# Patient Record
Sex: FEMALE | Race: WHITE | Employment: FULL TIME | URBAN - METROPOLITAN AREA
[De-identification: names, ages, dates, MRNs, and addresses within clinical notes are randomized per-mention and may not be internally consistent; named-entity substitution may affect disease eponyms.]

---

## 2017-04-11 PROBLEM — R07.9 CHEST PAIN: Status: ACTIVE | Noted: 2017-04-11

## 2017-04-17 PROBLEM — M48.9 CERVICAL SPINE DISEASE: Status: ACTIVE | Noted: 2017-04-17

## 2019-08-14 PROBLEM — E66.01 SEVERE OBESITY (HCC): Status: ACTIVE | Noted: 2019-08-14

## 2019-09-21 ENCOUNTER — HOSPITAL ENCOUNTER (OUTPATIENT)
Dept: MRI IMAGING | Age: 54
Discharge: HOME | End: 2019-09-21
Attending: FAMILY MEDICINE

## 2019-09-21 DIAGNOSIS — M54.6 ACUTE RIGHT-SIDED THORACIC BACK PAIN: ICD-10-CM

## 2020-07-06 LAB — HBA1C MFR BLD HPLC: 6.9 %

## 2020-08-20 PROBLEM — M43.16 SPONDYLOLISTHESIS AT L4-L5 LEVEL: Status: ACTIVE | Noted: 2020-08-20

## 2020-08-20 PROBLEM — E66.9 CLASS 2 OBESITY WITHOUT SERIOUS COMORBIDITY WITH BODY MASS INDEX (BMI) OF 37.0 TO 37.9 IN ADULT: Status: ACTIVE | Noted: 2019-08-14

## 2020-08-20 PROBLEM — E66.01 SEVERE OBESITY (HCC): Status: ACTIVE | Noted: 2020-08-20

## 2020-08-20 PROBLEM — Z72.0 TOBACCO ABUSE: Status: ACTIVE | Noted: 2020-08-20

## 2020-09-14 NOTE — PROGRESS NOTES
Do you currently have any signs or symptoms of respiratory infection, such as fever, cough, shortness of breath, or sore throat? no    In the last 14 days have you had contact with someone with confirmed or presumptive diagnosis of COVID-19 or someone under investigation of COVID-19?  no    In the last 14 days have you traveled or have someone in your home who has traveled to [place], [facility], [facility], [place], [place], [place], [place], or [place]? no

## 2020-09-14 NOTE — PROGRESS NOTES
Patient pre-assessment complete for felipe/lei scheduled for 9/15/2020, arrival time 0630. Patient verified using . Patient instructed to bring all home medications in labeled bottles on the day of procedure. NPO status reinforced. Patient informed to take a full dose aspirin 325mg  or 81 mg x 4 on the day of procedure. Instructed they can take all other medications excluding vitamins & supplements. Patient verbalizes understanding of all instructions & denies any questions at this time.

## 2020-09-15 ENCOUNTER — HOSPITAL ENCOUNTER (OUTPATIENT)
Dept: CARDIAC CATH/INVASIVE PROCEDURES | Age: 55
Discharge: HOME | End: 2020-09-15
Attending: INTERNAL MEDICINE | Admitting: INTERNAL MEDICINE
Payer: COMMERCIAL

## 2020-09-15 VITALS
BODY MASS INDEX: 37.61 KG/M2 | HEIGHT: 66 IN | RESPIRATION RATE: 16 BRPM | HEART RATE: 70 BPM | OXYGEN SATURATION: 96 % | SYSTOLIC BLOOD PRESSURE: 116 MMHG | DIASTOLIC BLOOD PRESSURE: 55 MMHG | WEIGHT: 234 LBS | TEMPERATURE: 98.6 F

## 2020-09-15 LAB
ALBUMIN SERPL-MCNC: 3.3 G/DL (ref 3.5–5)
ALBUMIN/GLOB SERPL: 0.8 {RATIO} (ref 1.2–3.5)
ALP SERPL-CCNC: 104 U/L (ref 50–136)
ALT SERPL-CCNC: 40 U/L (ref 12–65)
ANION GAP SERPL CALC-SCNC: 7 MMOL/L (ref 7–16)
AST SERPL-CCNC: 21 U/L (ref 15–37)
ATRIAL RATE: 72 BPM
BILIRUB SERPL-MCNC: 0.2 MG/DL (ref 0.2–1.1)
BUN SERPL-MCNC: 10 MG/DL (ref 6–23)
CALCIUM SERPL-MCNC: 8.7 MG/DL (ref 8.3–10.4)
CALCULATED P AXIS, ECG09: 30 DEGREES
CALCULATED R AXIS, ECG10: -49 DEGREES
CALCULATED T AXIS, ECG11: 47 DEGREES
CHLORIDE SERPL-SCNC: 108 MMOL/L (ref 98–107)
CO2 SERPL-SCNC: 24 MMOL/L (ref 21–32)
CREAT SERPL-MCNC: 0.8 MG/DL (ref 0.6–1)
DIAGNOSIS, 93000: NORMAL
ERYTHROCYTE [DISTWIDTH] IN BLOOD BY AUTOMATED COUNT: 14.5 % (ref 11.9–14.6)
GLOBULIN SER CALC-MCNC: 4.1 G/DL (ref 2.3–3.5)
GLUCOSE SERPL-MCNC: 159 MG/DL (ref 65–100)
HCT VFR BLD AUTO: 41.2 % (ref 35.8–46.3)
HGB BLD-MCNC: 13.2 G/DL (ref 11.7–15.4)
INR PPP: 0.9
MCH RBC QN AUTO: 27 PG (ref 26.1–32.9)
MCHC RBC AUTO-ENTMCNC: 32 G/DL (ref 31.4–35)
MCV RBC AUTO: 84.4 FL (ref 79.6–97.8)
NRBC # BLD: 0 K/UL (ref 0–0.2)
P-R INTERVAL, ECG05: 176 MS
PLATELET # BLD AUTO: 278 K/UL (ref 150–450)
PMV BLD AUTO: 9.6 FL (ref 9.4–12.3)
POTASSIUM SERPL-SCNC: 3.2 MMOL/L (ref 3.5–5.1)
PROT SERPL-MCNC: 7.4 G/DL (ref 6.3–8.2)
PROTHROMBIN TIME: 12.6 SEC (ref 12–14.7)
Q-T INTERVAL, ECG07: 402 MS
QRS DURATION, ECG06: 94 MS
QTC CALCULATION (BEZET), ECG08: 440 MS
RBC # BLD AUTO: 4.88 M/UL (ref 4.05–5.2)
SODIUM SERPL-SCNC: 139 MMOL/L (ref 136–145)
VENTRICULAR RATE, ECG03: 72 BPM
WBC # BLD AUTO: 8.7 K/UL (ref 4.3–11.1)

## 2020-09-15 PROCEDURE — 75630 X-RAY AORTA LEG ARTERIES: CPT

## 2020-09-15 PROCEDURE — 85610 PROTHROMBIN TIME: CPT

## 2020-09-15 PROCEDURE — 75710 ARTERY X-RAYS ARM/LEG: CPT

## 2020-09-15 PROCEDURE — 93005 ELECTROCARDIOGRAM TRACING: CPT | Performed by: INTERNAL MEDICINE

## 2020-09-15 PROCEDURE — 77030018786 HC NDL GD F/USND BARD -B

## 2020-09-15 PROCEDURE — C1725 CATH, TRANSLUMIN NON-LASER: HCPCS

## 2020-09-15 PROCEDURE — C1769 GUIDE WIRE: HCPCS

## 2020-09-15 PROCEDURE — 85027 COMPLETE CBC AUTOMATED: CPT

## 2020-09-15 PROCEDURE — 99153 MOD SED SAME PHYS/QHP EA: CPT

## 2020-09-15 PROCEDURE — 37221 HC PLC STNT ILIAC +/- PTA INIT: CPT

## 2020-09-15 PROCEDURE — 75625 CONTRAST EXAM ABDOMINL AORTA: CPT

## 2020-09-15 PROCEDURE — 80053 COMPREHEN METABOLIC PANEL: CPT

## 2020-09-15 PROCEDURE — 75716 ARTERY X-RAYS ARMS/LEGS: CPT

## 2020-09-15 PROCEDURE — C1894 INTRO/SHEATH, NON-LASER: HCPCS

## 2020-09-15 PROCEDURE — 74011000258 HC RX REV CODE- 258: Performed by: INTERNAL MEDICINE

## 2020-09-15 PROCEDURE — 74011000636 HC RX REV CODE- 636: Performed by: INTERNAL MEDICINE

## 2020-09-15 PROCEDURE — 74011000250 HC RX REV CODE- 250: Performed by: INTERNAL MEDICINE

## 2020-09-15 PROCEDURE — 77030015395 HC CATH ANGI DX SVU ANGI -C

## 2020-09-15 PROCEDURE — 74011250637 HC RX REV CODE- 250/637: Performed by: INTERNAL MEDICINE

## 2020-09-15 PROCEDURE — C1760 CLOSURE DEV, VASC: HCPCS

## 2020-09-15 PROCEDURE — C1876 STENT, NON-COA/NON-COV W/DEL: HCPCS

## 2020-09-15 PROCEDURE — 99152 MOD SED SAME PHYS/QHP 5/>YRS: CPT

## 2020-09-15 PROCEDURE — 74011250636 HC RX REV CODE- 250/636: Performed by: INTERNAL MEDICINE

## 2020-09-15 RX ORDER — IODIXANOL 320 MG/ML
30-300 INJECTION, SOLUTION INTRAVASCULAR ONCE
Status: COMPLETED | OUTPATIENT
Start: 2020-09-15 | End: 2020-09-15

## 2020-09-15 RX ORDER — HEPARIN SODIUM 200 [USP'U]/100ML
2 INJECTION, SOLUTION INTRAVENOUS CONTINUOUS
Status: DISCONTINUED | OUTPATIENT
Start: 2020-09-15 | End: 2020-09-15 | Stop reason: HOSPADM

## 2020-09-15 RX ORDER — SODIUM CHLORIDE 9 MG/ML
250 INJECTION, SOLUTION INTRAVENOUS CONTINUOUS
Status: ACTIVE | OUTPATIENT
Start: 2020-09-15 | End: 2020-09-15

## 2020-09-15 RX ORDER — CLOPIDOGREL BISULFATE 75 MG/1
600 TABLET ORAL ONCE
Status: COMPLETED | OUTPATIENT
Start: 2020-09-15 | End: 2020-09-15

## 2020-09-15 RX ORDER — CLOPIDOGREL BISULFATE 75 MG/1
75 TABLET ORAL DAILY
Qty: 90 TAB | Refills: 3 | Status: SHIPPED | OUTPATIENT
Start: 2020-09-15 | End: 2021-01-18

## 2020-09-15 RX ORDER — SODIUM CHLORIDE 0.9 % (FLUSH) 0.9 %
5-40 SYRINGE (ML) INJECTION EVERY 8 HOURS
Status: DISCONTINUED | OUTPATIENT
Start: 2020-09-15 | End: 2020-09-15 | Stop reason: HOSPADM

## 2020-09-15 RX ORDER — LIDOCAINE HYDROCHLORIDE 10 MG/ML
3-20 INJECTION, SOLUTION EPIDURAL; INFILTRATION; INTRACAUDAL; PERINEURAL ONCE
Status: COMPLETED | OUTPATIENT
Start: 2020-09-15 | End: 2020-09-15

## 2020-09-15 RX ORDER — SODIUM CHLORIDE 9 MG/ML
75 INJECTION, SOLUTION INTRAVENOUS CONTINUOUS
Status: DISCONTINUED | OUTPATIENT
Start: 2020-09-15 | End: 2020-09-15 | Stop reason: HOSPADM

## 2020-09-15 RX ORDER — MIDAZOLAM HYDROCHLORIDE 1 MG/ML
.5-2 INJECTION, SOLUTION INTRAMUSCULAR; INTRAVENOUS
Status: DISCONTINUED | OUTPATIENT
Start: 2020-09-15 | End: 2020-09-15 | Stop reason: HOSPADM

## 2020-09-15 RX ORDER — SODIUM CHLORIDE 0.9 % (FLUSH) 0.9 %
5-40 SYRINGE (ML) INJECTION AS NEEDED
Status: DISCONTINUED | OUTPATIENT
Start: 2020-09-15 | End: 2020-09-15 | Stop reason: HOSPADM

## 2020-09-15 RX ORDER — GUAIFENESIN 100 MG/5ML
81-324 LIQUID (ML) ORAL ONCE
Status: DISCONTINUED | OUTPATIENT
Start: 2020-09-15 | End: 2020-09-15 | Stop reason: HOSPADM

## 2020-09-15 RX ORDER — MAG HYDROX/ALUMINUM HYD/SIMETH 200-200-20
30 SUSPENSION, ORAL (FINAL DOSE FORM) ORAL ONCE
Status: COMPLETED | OUTPATIENT
Start: 2020-09-15 | End: 2020-09-15

## 2020-09-15 RX ORDER — FENTANYL CITRATE 50 UG/ML
25-100 INJECTION, SOLUTION INTRAMUSCULAR; INTRAVENOUS
Status: DISCONTINUED | OUTPATIENT
Start: 2020-09-15 | End: 2020-09-15 | Stop reason: HOSPADM

## 2020-09-15 RX ORDER — ONDANSETRON 2 MG/ML
4 INJECTION INTRAMUSCULAR; INTRAVENOUS ONCE
Status: COMPLETED | OUTPATIENT
Start: 2020-09-15 | End: 2020-09-15

## 2020-09-15 RX ADMIN — ONDANSETRON 4 MG: 2 INJECTION INTRAMUSCULAR; INTRAVENOUS at 10:04

## 2020-09-15 RX ADMIN — BIVALIRUDIN 1.75 MG/KG/HR: 250 INJECTION INTRACAVERNOUS at 09:11

## 2020-09-15 RX ADMIN — HEPARIN SODIUM 2 UNITS/HR: 5000 INJECTION, SOLUTION INTRAVENOUS; SUBCUTANEOUS at 08:40

## 2020-09-15 RX ADMIN — MIDAZOLAM 2 MG: 1 INJECTION INTRAMUSCULAR; INTRAVENOUS at 09:17

## 2020-09-15 RX ADMIN — FENTANYL CITRATE 50 MCG: 50 INJECTION, SOLUTION INTRAMUSCULAR; INTRAVENOUS at 08:48

## 2020-09-15 RX ADMIN — ALUMINUM HYDROXIDE, MAGNESIUM HYDROXIDE, AND SIMETHICONE 30 ML: 200; 200; 20 SUSPENSION ORAL at 09:36

## 2020-09-15 RX ADMIN — FENTANYL CITRATE 50 MCG: 50 INJECTION, SOLUTION INTRAMUSCULAR; INTRAVENOUS at 09:00

## 2020-09-15 RX ADMIN — IODIXANOL 150 ML: 320 INJECTION, SOLUTION INTRAVASCULAR at 09:30

## 2020-09-15 RX ADMIN — MIDAZOLAM 1 MG: 1 INJECTION INTRAMUSCULAR; INTRAVENOUS at 09:00

## 2020-09-15 RX ADMIN — LIDOCAINE HYDROCHLORIDE 12 ML: 10 INJECTION, SOLUTION EPIDURAL; INFILTRATION; INTRACAUDAL; PERINEURAL at 08:54

## 2020-09-15 RX ADMIN — CLOPIDOGREL BISULFATE 600 MG: 75 TABLET ORAL at 09:36

## 2020-09-15 RX ADMIN — MIDAZOLAM 2 MG: 1 INJECTION INTRAMUSCULAR; INTRAVENOUS at 08:51

## 2020-09-15 RX ADMIN — MIDAZOLAM 2 MG: 1 INJECTION INTRAMUSCULAR; INTRAVENOUS at 08:48

## 2020-09-15 NOTE — PROGRESS NOTES
Assisted OOB & ambulated to BR & on unit. Tolerated activity without difficulty.  Left groin without bleeding or hematoma

## 2020-09-15 NOTE — PROCEDURES
Brief Cardiac Procedure Note    Patient: Katya Crane MRN: [id number]  SSN: [id number]    YOB: 1965  Age: 60 y.o. Sex: female      Date of Procedure: 9/15/2020     Pre-procedure Diagnosis: PAD with Claudication    Post-procedure Diagnosis: PAD    Procedure:   1. Abdominal Aogram  2.  3rd order (R)LE r/o  3.  2nd order (L)LE r/o  4. PPI stent (L) External iliac    Brief Description of Procedure: See note    Performed By: Devin Magana MD     Assistants: None    Anesthesia: Moderate Sedation    Estimated Blood Loss: Less than 10 mL      Specimens: None    Implants: None    Findings:   (L) Ext Iliac 80-0%   8.0x30 SMART stent   7.0x20 [place] balloon    600mg Clopidogrel    (L)CFA angioseal    Complications: None    Recommendations: Continue medical therapy.     Signed By: Devin Magana MD     September 15, 2020

## 2020-09-15 NOTE — PROCEDURES
[facility]  CARDIAC CATH    Name:  Aisha Kelly  MR#:  [id number]  :  1965  ACCOUNT #:  [de-identified]  DATE OF SERVICE:  09/15/2020    PRIMARY CARDIOLOGIST:  oJn Spicer MD    PRIMARY CARE PHYSICIAN:  Abram Resendiz. Vibha Moran MD    BRIEF HISTORY:  The patient is a 42-year-old female with history of peripheral arterial disease, prior PCI and stenting of the left iliac in . She presents now with complicated lower back issues as well as left lower extremity claudication. Unfortunately, she remains noncompliant with tobacco cessation. Review of lower extremity duplex suggests mild to moderately reduced left lower extremity ABIs. There appears to be no significant obstruction in the infrainguinal aspect of her arteries. There is turbulent flow seen in the left common iliac and external iliac, but imaging could not assessment degree of stenosis. Given abnormal BHAKTI and symptoms, she is referred for angiography intervention. PREOPERATIVE DIAGNOSIS:  Peripheral arterial disease with intermittent claudication. POSTOPERATIVE DIAGNOSIS:  Peripheral arterial disease with intermittent claudication. SURGEON:  Manoj Tripp MD    ASSISTANT:  None. PROCEDURES PERFORMED:  1. Abdominal aortography. 2.  Oblique pelvic angiography bilaterally. 3.  Third order right lower extremity runoff. 4.  Second order left lower extremity runoff. 5.  PPI with stenting of the left external iliac artery. COMPLICATIONS:  None. ANESTHESIA:  Conscious sedation. Start time 08:48, end time 09:30. MEDICATIONS:  7 mg of Versed, 100 mcg of fentanyl. MONITORING RN:  Moris Sherman. SPECIMENS:  None. IMPLANTS:  Please see below. ESTIMATED BLOOD LOSS:  Less than 5 mL. PROCEDURE:  After informed consent, she was prepped and draped in the usual sterile fashion. The left groin was infiltrated with lidocaine. Under ultrasound guidance, the left femoral artery was accessed.   A 5-Sol[place] Islander sheath was advanced. A 5-[place] Islander Omni flush catheter was utilized for abdominal aortography and crossover technique third order right lower extremity runoff. A 6-Sol[place] Islander sheath was utilized for left lower extremity runoff. Visipaque contrast utilized. FINDINGS:  1. Abdominal aorta:  Abdominal aorta is fairly regular in appearance. There is moderate calcification seen throughout. There is single unilateral renal arteries bilaterally which remain patent. 2.  Oblique pelvic angiography demonstrates normal-appearing with mild ectasia of the right common iliac artery, but widely patent internal and external iliac arteries bilaterally. 3.  Left common iliac artery has been stented. There is mild stenosis at the ostium on the order of 20%. There is an irregularity at the distal stent edge which appears to be associated with 80% stenosis. The internal iliac artery is patent. The disease extends into the very proximal external iliac artery on the order of 80%. The external iliac artery otherwise is widely patent. 4.  Right lower extremity runoff: This was obtained via a 5-[place] Islander Omni flush catheter. a. Common femoral artery:  Normal.  b.  Profunda femoral artery:  Normal.  c.  Superficial femoral artery:  Normal.  d.  Popliteal artery:  Normal.  e. Anterior tibial artery:  Normal.  f.  Tibioperoneal trunk:  Normal.  g. Peroneal artery:  Normal.  h.  Posterior tibial artery:  Normal.  5.  Left lower extremity runoff:  a.  Common femoral:  Normal.  b.  Profunda femoral:  Normal.  c.  Superficial femoral artery:  Normal.  d.  Popliteal artery:  Normal.  e. Anterior tibial artery:  Normal.  f.  Tibioperoneal trunk:  Normal.  g. Peroneal artery:  Normal.  h.  Posterior tibial artery:  Normal.    PERIPHERAL INTERVENTION:  Lesion:  Left external iliac artery. Pre-stenosis 80%, post-stenosis 0%. DETAILS:  The patient was anticoagulated with Angiomax. A 6-[place] Islander Brite tip sheath was utilized. This was placed distal to the stent and an 8 x 30 Smart nitinol self-expanding stent was overlapped with the previously placed common iliac artery stent. This was deployed in standard fashion. A 7.0 x 20 [place] 35 balloon was then positioned and inflated up to 10 atmospheres for 60 seconds with excellent stent apposition. The balloon was removed. The 5-Thai Omni flush catheter was then advanced back into the distal aorta. Repeat oblique pelvic angiography demonstrates widely patent stenting and excellent result post external iliac artery stenting. There was no compromise of the internal iliac artery on the left. Successful hemostasis with Angio-Seal closure device. CONCLUSIONS:  1. Complex distal stent edge stenosis extending into the external iliac artery, status post stenting as described above. 2.  Otherwise widely patent left common iliac artery stenting. 3.  Normal-appearing vasculature otherwise. RECOMMENDATIONS:  Monitor the patient for left lower extremity claudication improvement post stenting. If this does not improve, would recommend ongoing evaluation of chronic back pain. Thank you for allowing us to participate in the care of this patient. Any questions or concerns, please feel free to contact me.       Kemi Diallo MD      [id number]  D:  09/15/2020 9:47  T:  09/15/2020 10:54  JOB #:  [id number]  CC:  MD Quang Mcdowell MD

## 2020-09-15 NOTE — PROGRESS NOTES
TRANSFER - OUT REPORT:    ISAMAR Mclean  LFA closed with 6 fr angioseal  1 stent L iliac  Versed 7 mg  Fentanyl 100 mcg  Angiomax off at 0936  Plavix 600 mg  Mylanta 30 ml  No bleeding/hematoma    Verbal report given to magi(name) on Liliam Roland  being transferred to cpru(unit) for routine progression of care       Report consisted of patients Situation, Background, Assessment and   Recommendations(SBAR). Information from the following report(s) SBAR and Procedure Summary was reviewed with the receiving nurse. Lines:   Peripheral IV 09/15/20 Left Hand (Active)        Opportunity for questions and clarification was provided.

## 2020-09-15 NOTE — DISCHARGE INSTRUCTIONS
HEART CATHETERIZATION/ANGIOGRAPHY DISCHARGE INSTRUCTIONS    1. Check puncture site frequently for swelling or bleeding. If there is any bleeding, lie down and apply pressure over the area with a clean towel or washcloth. Call 911. Notify your doctor for any redness, swelling, drainage, or oozing from the puncture site. Notify your doctor for any fever or chills. 2. If the extremity becomes cold, numb, or painful call [facility] at [phone].  3. Activity should be limited for the next 48 hours. Climb stairs as little as possible and avoid any stooping, bending, or strenuous activity for 48 hours. No heavy lifting (anything over 10 pounds) for 3 days. 4. You may resume your usual diet. Drink more fluids than usual.  5. Have a responsible person drive you home and stay with you for at least 24 hours after your heart catheterization/angiography. Do not drive for the next 24 hours. 6. You may remove bandage from your Left groin in 24 hours. You may shower in 24 hours. No tub baths, hot tubs, or swimming for 1 week. Do not place any lotions, creams, powders, or ointments over puncture site for 1 week. You may place a clean band-aid over the puncture site each day for 5 days. Change daily. 7. You have been started on a new medication, plavix 75mg by mouth. Please fill prescription and begin taking tomorrow morning 9/16/20. I have read the above instructions and have had the opportunity to ask questions.       Patient: ________________________   Date: 9/15/2020    Witness: _______________________   Date: 9/15/2020

## 2020-09-15 NOTE — PROGRESS NOTES
Pt arrived, ambulated to room with no visible problems, planned MARIBEL/LEI for Dr Jen Garcia. Consent signed, Procedure discussed with pt all questions answered voiced understanding. Medications and history discussed with pt. Pt prepped per ordersThe patient has a fraility score of 3-MANAGING WELL, based on ability to complete ADls without assistance.       Patient took Aspirin 324mg today at 0600 prior to arrival.

## 2020-09-16 NOTE — PROGRESS NOTES
SAME DAY DISCHARGE FOLLOW UP PHONE CALL           NAME : Wil Henson           : 1965                    DATE/TIME:   9/15/20 at  9:30am Kirt CastellonSandra                         MRN# [id number]        1. Ensure that the patient has had their new prescriptions filled & ask if they have taken their anti-platelet & aspirin that day. Pt has taken asa & plavix today as prescribed. Reinforced the importance of continuing these medications as ordered & to not stop for any reason without discussing with her cardiologist first. Voiced good understanding. 2. Ask about access site. Have the patient assess for any signs of a hematoma. Ask about any pain at access site. Groin site without bleeding, swelling, hematoma, bruising, pain, numbness or tingling. Reports groin site is tender & sore but nothing severe. 3. Ask the patient if they had experienced any chest pain or shortness of breath. Denies any chest pain or shortness of breath.

## 2020-10-20 ENCOUNTER — HOSPITAL ENCOUNTER (OUTPATIENT)
Dept: GENERAL RADIOLOGY | Age: 55
Discharge: HOME | End: 2020-10-20
Payer: COMMERCIAL

## 2020-10-20 DIAGNOSIS — J44.9 CHRONIC OBSTRUCTIVE PULMONARY DISEASE, UNSPECIFIED COPD TYPE (HCC): ICD-10-CM

## 2020-10-20 PROCEDURE — 71046 X-RAY EXAM CHEST 2 VIEWS: CPT

## 2020-10-28 ENCOUNTER — HOSPITAL ENCOUNTER (OUTPATIENT)
Dept: CT IMAGING | Age: 55
Discharge: HOME | End: 2020-10-28
Attending: NURSE PRACTITIONER
Payer: COMMERCIAL

## 2020-10-28 VITALS — BODY MASS INDEX: 36.96 KG/M2 | HEIGHT: 66 IN | WEIGHT: 230 LBS

## 2020-10-28 DIAGNOSIS — F17.218 CIGARETTE NICOTINE DEPENDENCE WITH OTHER NICOTINE-INDUCED DISORDER: ICD-10-CM

## 2020-10-28 PROCEDURE — G0297 LDCT FOR LUNG CA SCREEN: HCPCS

## 2020-10-28 NOTE — PROGRESS NOTES
Please let her know that chest CT demonstrates no worrisome findings, no lung nodules. Will review at upcoming appointment.

## 2020-10-30 NOTE — PROGRESS NOTES
Patient called back, gave results of ct scan as instructed by Ms. Ryanne Calderon, told her will discuss more details in next appointment, patGasper bronson'd message given  Angel Gutierrez

## 2020-12-11 ENCOUNTER — HOSPITAL ENCOUNTER (OUTPATIENT)
Dept: SLEEP MEDICINE | Age: 55
Discharge: HOME | End: 2020-12-11
Payer: COMMERCIAL

## 2020-12-11 PROCEDURE — 95810 POLYSOM 6/> YRS 4/> PARAM: CPT

## 2021-02-15 ENCOUNTER — HOSPITAL ENCOUNTER (OUTPATIENT)
Dept: CT IMAGING | Age: 56
Discharge: HOME | End: 2021-02-15
Attending: INTERNAL MEDICINE
Payer: COMMERCIAL

## 2021-02-15 DIAGNOSIS — I73.9 CLAUDICATION (HCC): ICD-10-CM

## 2021-02-15 LAB — CREAT BLD-MCNC: 0.6 MG/DL (ref 0.8–1.5)

## 2021-02-15 PROCEDURE — 82565 ASSAY OF CREATININE: CPT

## 2021-02-15 PROCEDURE — 74011000258 HC RX REV CODE- 258: Performed by: INTERNAL MEDICINE

## 2021-02-15 PROCEDURE — 75635 CT ANGIO ABDOMINAL ARTERIES: CPT

## 2021-02-15 PROCEDURE — 74011000636 HC RX REV CODE- 636: Performed by: INTERNAL MEDICINE

## 2021-02-15 RX ORDER — SODIUM CHLORIDE 0.9 % (FLUSH) 0.9 %
10 SYRINGE (ML) INJECTION
Status: COMPLETED | OUTPATIENT
Start: 2021-02-15 | End: 2021-02-15

## 2021-02-15 RX ADMIN — IOPAMIDOL 100 ML: 755 INJECTION, SOLUTION INTRAVENOUS at 15:17

## 2021-02-15 RX ADMIN — SODIUM CHLORIDE 100 ML: 900 INJECTION, SOLUTION INTRAVENOUS at 15:17

## 2021-02-15 RX ADMIN — Medication 10 ML: at 15:17

## 2021-04-06 ENCOUNTER — HOSPITAL ENCOUNTER (OUTPATIENT)
Dept: LAB | Age: 56
Discharge: HOME | End: 2021-04-06
Payer: COMMERCIAL

## 2021-04-06 DIAGNOSIS — I25.10 CORONARY ARTERY DISEASE INVOLVING NATIVE HEART WITHOUT ANGINA PECTORIS, UNSPECIFIED VESSEL OR LESION TYPE: ICD-10-CM

## 2021-04-06 LAB
ANION GAP SERPL CALC-SCNC: 4 MMOL/L (ref 7–16)
BASOPHILS # BLD: 0 K/UL (ref 0–0.2)
BASOPHILS NFR BLD: 0 % (ref 0–2)
BUN SERPL-MCNC: 11 MG/DL (ref 6–23)
CALCIUM SERPL-MCNC: 8.8 MG/DL (ref 8.3–10.4)
CHLORIDE SERPL-SCNC: 111 MMOL/L (ref 98–107)
CO2 SERPL-SCNC: 26 MMOL/L (ref 21–32)
CREAT SERPL-MCNC: 0.66 MG/DL (ref 0.6–1)
DIFFERENTIAL METHOD BLD: ABNORMAL
EOSINOPHIL # BLD: 0.2 K/UL (ref 0–0.8)
EOSINOPHIL NFR BLD: 3 % (ref 0.5–7.8)
ERYTHROCYTE [DISTWIDTH] IN BLOOD BY AUTOMATED COUNT: 14.9 % (ref 11.9–14.6)
GLUCOSE SERPL-MCNC: 151 MG/DL (ref 65–100)
HCT VFR BLD AUTO: 40.6 % (ref 35.8–46.3)
HGB BLD-MCNC: 12.9 G/DL (ref 11.7–15.4)
IMM GRANULOCYTES # BLD AUTO: 0 K/UL (ref 0–0.5)
IMM GRANULOCYTES NFR BLD AUTO: 1 % (ref 0–5)
INR PPP: 0.8
LYMPHOCYTES # BLD: 2.4 K/UL (ref 0.5–4.6)
LYMPHOCYTES NFR BLD: 30 % (ref 13–44)
MCH RBC QN AUTO: 26.9 PG (ref 26.1–32.9)
MCHC RBC AUTO-ENTMCNC: 31.8 G/DL (ref 31.4–35)
MCV RBC AUTO: 84.8 FL (ref 79.6–97.8)
MONOCYTES # BLD: 0.6 K/UL (ref 0.1–1.3)
MONOCYTES NFR BLD: 7 % (ref 4–12)
NEUTS SEG # BLD: 4.6 K/UL (ref 1.7–8.2)
NEUTS SEG NFR BLD: 59 % (ref 43–78)
NRBC # BLD: 0 K/UL (ref 0–0.2)
PLATELET # BLD AUTO: 277 K/UL (ref 150–450)
PMV BLD AUTO: 10.2 FL (ref 9.4–12.3)
POTASSIUM SERPL-SCNC: 3.5 MMOL/L (ref 3.5–5.1)
PROTHROMBIN TIME: 11.8 SEC (ref 12.5–14.7)
RBC # BLD AUTO: 4.79 M/UL (ref 4.05–5.2)
SODIUM SERPL-SCNC: 141 MMOL/L (ref 136–145)
WBC # BLD AUTO: 7.9 K/UL (ref 4.3–11.1)

## 2021-04-06 PROCEDURE — 85610 PROTHROMBIN TIME: CPT

## 2021-04-06 PROCEDURE — 85025 COMPLETE CBC W/AUTO DIFF WBC: CPT

## 2021-04-06 PROCEDURE — 80048 BASIC METABOLIC PNL TOTAL CA: CPT

## 2021-04-06 PROCEDURE — 36415 COLL VENOUS BLD VENIPUNCTURE: CPT

## 2021-04-12 NOTE — PROGRESS NOTES
Patient pre-assessment complete for [facility] scheduled for 21, arrival time 0600. Patient verified using . NPO status reinforced. Patient informed to take a full dose aspirin 325mg  or 81 mg x 4 on the day of procedure. Instructed they can take all other medications excluding vitamins & supplements. Patient verbalizes understanding of all instructions & denies any questions at this time.

## 2021-04-13 ENCOUNTER — HOSPITAL ENCOUNTER (OUTPATIENT)
Dept: CARDIAC CATH/INVASIVE PROCEDURES | Age: 56
Setting detail: OBSERVATION
Discharge: HOME | End: 2021-04-14
Attending: INTERNAL MEDICINE | Admitting: INTERNAL MEDICINE
Payer: COMMERCIAL

## 2021-04-13 DIAGNOSIS — R07.89 OTHER CHEST PAIN: ICD-10-CM

## 2021-04-13 DIAGNOSIS — I25.10 CORONARY ARTERY DISEASE INVOLVING NATIVE HEART WITHOUT ANGINA PECTORIS, UNSPECIFIED VESSEL OR LESION TYPE: ICD-10-CM

## 2021-04-13 LAB
ATRIAL RATE: 67 BPM
CALCULATED P AXIS, ECG09: 41 DEGREES
CALCULATED R AXIS, ECG10: -6 DEGREES
CALCULATED T AXIS, ECG11: 35 DEGREES
DIAGNOSIS, 93000: NORMAL
P-R INTERVAL, ECG05: 184 MS
Q-T INTERVAL, ECG07: 418 MS
QRS DURATION, ECG06: 84 MS
QTC CALCULATION (BEZET), ECG08: 441 MS
VENTRICULAR RATE, ECG03: 67 BPM

## 2021-04-13 PROCEDURE — C1874 STENT, COATED/COV W/DEL SYS: HCPCS

## 2021-04-13 PROCEDURE — 74011000258 HC RX REV CODE- 258: Performed by: INTERNAL MEDICINE

## 2021-04-13 PROCEDURE — 93458 L HRT ARTERY/VENTRICLE ANGIO: CPT | Performed by: INTERNAL MEDICINE

## 2021-04-13 PROCEDURE — 74011250636 HC RX REV CODE- 250/636: Performed by: INTERNAL MEDICINE

## 2021-04-13 PROCEDURE — 77030042317 HC BND COMPR HEMSTAT -B

## 2021-04-13 PROCEDURE — 99218 HC RM OBSERVATION: CPT

## 2021-04-13 PROCEDURE — C1769 GUIDE WIRE: HCPCS

## 2021-04-13 PROCEDURE — 93458 L HRT ARTERY/VENTRICLE ANGIO: CPT

## 2021-04-13 PROCEDURE — C1887 CATHETER, GUIDING: HCPCS

## 2021-04-13 PROCEDURE — 2709999900 HC NON-CHARGEABLE SUPPLY

## 2021-04-13 PROCEDURE — 74011000250 HC RX REV CODE- 250: Performed by: INTERNAL MEDICINE

## 2021-04-13 PROCEDURE — C1725 CATH, TRANSLUMIN NON-LASER: HCPCS

## 2021-04-13 PROCEDURE — 99153 MOD SED SAME PHYS/QHP EA: CPT

## 2021-04-13 PROCEDURE — C1893 INTRO/SHEATH, FIXED,NON-PEEL: HCPCS

## 2021-04-13 PROCEDURE — 92928 PRQ TCAT PLMT NTRAC ST 1 LES: CPT

## 2021-04-13 PROCEDURE — 77030016699 HC CATH ANGI DX INFN1 CARD -A

## 2021-04-13 PROCEDURE — 74011250637 HC RX REV CODE- 250/637: Performed by: NURSE PRACTITIONER

## 2021-04-13 PROCEDURE — 74011000636 HC RX REV CODE- 636: Performed by: INTERNAL MEDICINE

## 2021-04-13 PROCEDURE — 99152 MOD SED SAME PHYS/QHP 5/>YRS: CPT | Performed by: INTERNAL MEDICINE

## 2021-04-13 PROCEDURE — 74011250636 HC RX REV CODE- 250/636: Performed by: NURSE PRACTITIONER

## 2021-04-13 PROCEDURE — 77030015766

## 2021-04-13 PROCEDURE — 92928 PRQ TCAT PLMT NTRAC ST 1 LES: CPT | Performed by: INTERNAL MEDICINE

## 2021-04-13 PROCEDURE — 93005 ELECTROCARDIOGRAM TRACING: CPT | Performed by: INTERNAL MEDICINE

## 2021-04-13 PROCEDURE — 74011250637 HC RX REV CODE- 250/637: Performed by: INTERNAL MEDICINE

## 2021-04-13 PROCEDURE — 99152 MOD SED SAME PHYS/QHP 5/>YRS: CPT

## 2021-04-13 RX ORDER — CLOPIDOGREL BISULFATE 75 MG/1
75 TABLET ORAL DAILY
Status: DISCONTINUED | OUTPATIENT
Start: 2021-04-14 | End: 2021-04-14 | Stop reason: HOSPADM

## 2021-04-13 RX ORDER — FENTANYL CITRATE 50 UG/ML
25-75 INJECTION, SOLUTION INTRAMUSCULAR; INTRAVENOUS
Status: DISCONTINUED | OUTPATIENT
Start: 2021-04-13 | End: 2021-04-13 | Stop reason: HOSPADM

## 2021-04-13 RX ORDER — ATORVASTATIN CALCIUM 40 MG/1
40 TABLET, FILM COATED ORAL DAILY
Status: DISCONTINUED | OUTPATIENT
Start: 2021-04-13 | End: 2021-04-14 | Stop reason: HOSPADM

## 2021-04-13 RX ORDER — ALBUTEROL SULFATE 0.83 MG/ML
2.5 SOLUTION RESPIRATORY (INHALATION)
Status: DISCONTINUED | OUTPATIENT
Start: 2021-04-13 | End: 2021-04-14 | Stop reason: HOSPADM

## 2021-04-13 RX ORDER — GUAIFENESIN 100 MG/5ML
81-324 LIQUID (ML) ORAL ONCE
Status: COMPLETED | OUTPATIENT
Start: 2021-04-13 | End: 2021-04-13

## 2021-04-13 RX ORDER — ACETAMINOPHEN 325 MG/1
650 TABLET ORAL
Status: DISCONTINUED | OUTPATIENT
Start: 2021-04-13 | End: 2021-04-14 | Stop reason: HOSPADM

## 2021-04-13 RX ORDER — HEPARIN SODIUM 200 [USP'U]/100ML
3 INJECTION, SOLUTION INTRAVENOUS CONTINUOUS
Status: DISCONTINUED | OUTPATIENT
Start: 2021-04-13 | End: 2021-04-13 | Stop reason: HOSPADM

## 2021-04-13 RX ORDER — LIDOCAINE HYDROCHLORIDE 10 MG/ML
1-30 INJECTION, SOLUTION EPIDURAL; INFILTRATION; INTRACAUDAL; PERINEURAL ONCE
Status: COMPLETED | OUTPATIENT
Start: 2021-04-13 | End: 2021-04-13

## 2021-04-13 RX ORDER — SODIUM CHLORIDE 9 MG/ML
75 INJECTION, SOLUTION INTRAVENOUS CONTINUOUS
Status: DISCONTINUED | OUTPATIENT
Start: 2021-04-13 | End: 2021-04-13 | Stop reason: HOSPADM

## 2021-04-13 RX ORDER — CLOPIDOGREL BISULFATE 75 MG/1
600 TABLET ORAL ONCE
Status: COMPLETED | OUTPATIENT
Start: 2021-04-13 | End: 2021-04-13

## 2021-04-13 RX ORDER — ASPIRIN 81 MG/1
81 TABLET ORAL DAILY
Status: DISCONTINUED | OUTPATIENT
Start: 2021-04-13 | End: 2021-04-14 | Stop reason: HOSPADM

## 2021-04-13 RX ORDER — SODIUM CHLORIDE 9 MG/ML
75 INJECTION, SOLUTION INTRAVENOUS CONTINUOUS
Status: DISCONTINUED | OUTPATIENT
Start: 2021-04-13 | End: 2021-04-14 | Stop reason: HOSPADM

## 2021-04-13 RX ORDER — MIDAZOLAM HYDROCHLORIDE 1 MG/ML
.5-2 INJECTION, SOLUTION INTRAMUSCULAR; INTRAVENOUS
Status: DISCONTINUED | OUTPATIENT
Start: 2021-04-13 | End: 2021-04-13 | Stop reason: HOSPADM

## 2021-04-13 RX ORDER — MAG HYDROX/ALUMINUM HYD/SIMETH 200-200-20
30 SUSPENSION, ORAL (FINAL DOSE FORM) ORAL
Status: COMPLETED | OUTPATIENT
Start: 2021-04-13 | End: 2021-04-13

## 2021-04-13 RX ADMIN — BIVALIRUDIN 1.75 MG/KG/HR: 250 INJECTION, POWDER, LYOPHILIZED, FOR SOLUTION INTRAVENOUS at 08:54

## 2021-04-13 RX ADMIN — SODIUM CHLORIDE 75 ML/HR: 900 INJECTION, SOLUTION INTRAVENOUS at 14:02

## 2021-04-13 RX ADMIN — ASPIRIN 81 MG 324 MG: 81 TABLET ORAL at 07:00

## 2021-04-13 RX ADMIN — ATORVASTATIN CALCIUM 40 MG: 40 TABLET, FILM COATED ORAL at 13:44

## 2021-04-13 RX ADMIN — ALUMINUM HYDROXIDE, MAGNESIUM HYDROXIDE, AND SIMETHICONE 30 ML: 200; 200; 20 SUSPENSION ORAL at 09:12

## 2021-04-13 RX ADMIN — ACETAMINOPHEN 650 MG: 325 TABLET ORAL at 23:28

## 2021-04-13 RX ADMIN — IOPAMIDOL 190 ML: 755 INJECTION, SOLUTION INTRAVENOUS at 09:09

## 2021-04-13 RX ADMIN — FENTANYL CITRATE 25 MCG: 50 INJECTION, SOLUTION INTRAMUSCULAR; INTRAVENOUS at 08:45

## 2021-04-13 RX ADMIN — MIDAZOLAM 2 MG: 1 INJECTION INTRAMUSCULAR; INTRAVENOUS at 08:38

## 2021-04-13 RX ADMIN — LIDOCAINE HYDROCHLORIDE 4 ML: 10 INJECTION, SOLUTION EPIDURAL; INFILTRATION; INTRACAUDAL; PERINEURAL at 08:40

## 2021-04-13 RX ADMIN — CLOPIDOGREL BISULFATE 600 MG: 75 TABLET ORAL at 09:12

## 2021-04-13 RX ADMIN — HEPARIN SODIUM 3 UNITS/HR: 200 INJECTION, SOLUTION INTRAVENOUS at 08:20

## 2021-04-13 RX ADMIN — SODIUM CHLORIDE 75 ML/HR: 900 INJECTION, SOLUTION INTRAVENOUS at 06:45

## 2021-04-13 RX ADMIN — MIDAZOLAM 1 MG: 1 INJECTION INTRAMUSCULAR; INTRAVENOUS at 08:45

## 2021-04-13 RX ADMIN — FENTANYL CITRATE 25 MCG: 50 INJECTION, SOLUTION INTRAMUSCULAR; INTRAVENOUS at 09:02

## 2021-04-13 RX ADMIN — HEPARIN SODIUM 2 ML: 10000 INJECTION INTRAVENOUS; SUBCUTANEOUS at 08:45

## 2021-04-13 RX ADMIN — MIDAZOLAM 1 MG: 1 INJECTION INTRAMUSCULAR; INTRAVENOUS at 09:02

## 2021-04-13 RX ADMIN — FENTANYL CITRATE 25 MCG: 50 INJECTION, SOLUTION INTRAMUSCULAR; INTRAVENOUS at 08:37

## 2021-04-13 NOTE — PROCEDURES
[facility]  CARDIAC CATH    Name:  Mitch Kaur  MR#:  [id number]  :  1965  ACCOUNT #:  [de-identified]  DATE OF SERVICE:  2021    PROCEDURES PERFORMED:  Left heart catheterization via the right radial artery with a 5-Yi Tiger catheter and angled pigtail. Angioplasty and stenting of the LAD was performed with 6-Yi XB3.0, a Prowater wire, and Angiomax for anticoagulation. TR band was placed with good hemostasis. PREOPERATIVE DIAGNOSIS:  Accelerating chest pain consistent with British class IV angina. POSTOPERATIVE DIAGNOSIS:  Coronary artery disease. SURGEON:  Jeannine Brunner, MD    ASSISTANT:  None. ESTIMATED BLOOD LOSS:  10 mL. SPECIMENS REMOVED:  None. COMPLICATIONS:  None. IMPLANTS:  2.5 x 18 [place] drug-eluting stent. ANESTHESIA:  Anesthesia was provided by Emelina Watson RN beginning at 08:37, concluding at 09:10. A total of 4 mg of Versed, 75 mcg of fentanyl were given. Vital signs and saturation were stable throughout. FINDINGS:  Left ventricle is normal size, normal systolic function. Ejection fraction is 60%. Left ventricular end-diastolic pressure is 14 mmHg with an opening aortic pressure of 131/77. No gradient across the aortic valve. Left main coronary artery is in normal anatomic position, free of significant disease, bifurcates into LAD and circumflex system. The LAD has 30% proximal narrowing and then an 80% narrowing in the mid vessel. The distal vessel has 20%-30% narrowing. Circumflex is a large mid vessel obtuse marginal branch. There is no atherosclerosis in this vessel. AV groove circumflex is small with no high-grade narrowing seen. The right coronary artery is a dominant vessel and diffusely diseased up to 20% proximally, 20% in the midportion. The PDA and posterolateral obtuse marginal branches are free of significant disease.     After Angiomax was given, a Prowater wire was placed in the distal LAD, lesion was predilated with 2.5 x 12 NC Trek, stented with 2.5 x 18 [place] drug-eluting stent depolyed and postdilated with same the same 2.5 NC balloon with inflations to 16 atmospheres. There is 0% residual and MARCUS III flow. CONCLUSIONS:  1. Successful angioplasty and stenting of the LAD. 2.  Preserved LV systolic function. 3.  Plavix load. 4.  TR band for hemostasis.       Stacey Alexander MD      [id number]  D:  04/13/2021 9:16  T:  04/13/2021 11:44  JOB #:  [id number]

## 2021-04-13 NOTE — PROCEDURES
Brief Cardiac Procedure Note    Patient: Anish Lo MRN: [id number]  SSN: [id number]    YOB: 1965  Age: 60 y.o. Sex: female      Date of Procedure: 4/13/2021     Pre-procedure Diagnosis: Typical Angina    Post-procedure Diagnosis: Coronary Artery Disease    Reason for Procedure: New Onset Angina < or = 2 Months    Procedure: Left Heart Catheterization with Percutaneous Coronary Intervention    Brief Description of Procedure: lhc via right radial, tr    Performed By: Ike Rosado MD     Assistants: none    Anesthesia: Moderate Sedation    Estimated Blood Loss: Less than 10 mL      Specimens: None    Implants: None    Findings: abiodun lad, nl lvef    Complications: None    Recommendations: Continue medical therapy.     Signed By: Ike Rosado MD     April 13, 2021

## 2021-04-13 NOTE — PROGRESS NOTES
TRANSFER - OUT REPORT:    Verbal report given to Nikky Rosario RN on Yumair Cleaves  being transferred to 3rd floor for routine progression of care       Report consisted of patients Situation, Background, Assessment and Recommendations(SBAR). Information from the following report(s) SBAR, Kardex, Procedure Summary, MAR and Recent Results was reviewed with the receiving nurse. Opportunity for questions and clarification was provided. Right radial site C/D/I without bleeding or hematoma.

## 2021-04-13 NOTE — PROGRESS NOTES
TRANSFER - IN REPORT:    Verbal report received from VANDA Ellis on Jacqueline Wiggins being received from [facility] for routine progression of care      Report consisted of patients Situation, Background, Assessment and Recommendations(SBAR). Information from the following report(s) Procedure Summary was reviewed with the receiving nurse. Opportunity for questions and clarification was provided. Assessment completed upon patients arrival to unit and care assumed.

## 2021-04-13 NOTE — ROUTINE PROCESS
TRANSFER - OUT REPORT:    [facility] with PCI  Dr. Eileen Augustin  RRCECY access    Versed 4mg, fentanyl 75mcg, plavix 600mg, mylanta 30ml  angiomax for anticoagulation, stopped @ 6408  One stent in LAD  R band placed right wrist with 12ml air @ 8295    Verbal report given to Ledy DC(name) on Jacqueline Wiggins  being transferred to cpru(unit) for routine progression of care       Report consisted of patients Situation, Background, Assessment and   Recommendations(SBAR). Information from the following report(s) Procedure Summary was reviewed with the receiving nurse. Lines:   Peripheral IV 04/13/21 Right Antecubital (Active)       Peripheral IV 04/13/21 Left Hand (Active)        Opportunity for questions and clarification was provided.       Patient transported with:   [place]

## 2021-04-13 NOTE — PROGRESS NOTES
TRANSFER - IN REPORT:    Verbal report received from [employer] Jonh RN(name) on Gildardo Adalid  being received from cath lab(unit) for routine progression of care      Report consisted of patients Situation, Background, Assessment and   Recommendations(SBAR). Information from the following report(s) SBAR, Kardex and MAR was reviewed with the receiving nurse. Opportunity for questions and clarification was provided. Assessment completed upon patients arrival to unit and care assumed. PT resting in bed, NSR on monitor, BP WNL, denies chest pain. Pt voiced understanding of movement restrictions to RUE. Dual skin assessment completed. R radial cath site with TR band in place CDI. Tattoos visualized. No other impairments noted.

## 2021-04-13 NOTE — ROUTINE PROCESS
Bedside and Verbal shift change report given to self (oncoming nurse) by Kimberly Martinez RN (offgoing nurse). Report included the following information SBAR, Kardex, Intake/Output, MAR, Recent Results, Med Rec Status and Cardiac Rhythm NSR.

## 2021-04-13 NOTE — PROGRESS NOTES
Pt arrived, ambulated to room with no visible problems, planned C for Dr Silvia Garza. Consent signed, Procedure discussed with pt all questions answered voiced understanding. Medications and history discussed with pt.     Pt prepped per ordersThe patient has a fraility score of 4-VULNERABLE, based on ability to complete ADLs without assistance      Patient took Aspirin 324mg  today at 0530 prior to arrival.

## 2021-04-13 NOTE — PROGRESS NOTES
Pt arrived for scheduled LHC with Dr Jovan Spann. Pt is in [address] [facility]. No d/c needs identified currently. Will continue to monitor. Care Management Interventions  PCP Verified by CM: Alexa Gambino MD)  Mode of Transport at Discharge:  Other (see comment)(Family)  Transition of Care Consult (CM Consult): Discharge Planning  Current Support Network: [facility] [place], Own Home  Confirm Follow Up Transport: Family  The Plan for Transition of Care is Related to the Following Treatment Goals : Return to baseline  Discharge Location  Discharge Placement: Home

## 2021-04-13 NOTE — ROUTINE PROCESS
Radial compression band removed at 1345 after slowly reducing air from 12 cc to zero as per hospital protocol. No bleeding or hematoma noted. 2 x 2 gauze with tegaderm placed over puncture site. The affected extremity is warm and dry to the touch. Frequent vital signs printed and placed on bedside chart. Patient instructed to call if any bleeding noted on gauze. Patient verbalized understanding the nursing instructions.

## 2021-04-14 VITALS
HEIGHT: 66 IN | RESPIRATION RATE: 18 BRPM | HEART RATE: 59 BPM | BODY MASS INDEX: 36.47 KG/M2 | TEMPERATURE: 98.5 F | WEIGHT: 226.9 LBS | OXYGEN SATURATION: 96 % | SYSTOLIC BLOOD PRESSURE: 126 MMHG | DIASTOLIC BLOOD PRESSURE: 53 MMHG

## 2021-04-14 LAB
ANION GAP SERPL CALC-SCNC: 5 MMOL/L (ref 7–16)
BUN SERPL-MCNC: 9 MG/DL (ref 6–23)
CALCIUM SERPL-MCNC: 8.9 MG/DL (ref 8.3–10.4)
CHLORIDE SERPL-SCNC: 108 MMOL/L (ref 98–107)
CO2 SERPL-SCNC: 28 MMOL/L (ref 21–32)
CREAT SERPL-MCNC: 0.67 MG/DL (ref 0.6–1)
GLUCOSE SERPL-MCNC: 141 MG/DL (ref 65–100)
POTASSIUM SERPL-SCNC: 3.7 MMOL/L (ref 3.5–5.1)
SODIUM SERPL-SCNC: 141 MMOL/L (ref 136–145)

## 2021-04-14 PROCEDURE — 99238 HOSP IP/OBS DSCHRG MGMT 30/<: CPT | Performed by: INTERNAL MEDICINE

## 2021-04-14 PROCEDURE — 99218 HC RM OBSERVATION: CPT

## 2021-04-14 PROCEDURE — 74011250637 HC RX REV CODE- 250/637: Performed by: INTERNAL MEDICINE

## 2021-04-14 PROCEDURE — 80048 BASIC METABOLIC PNL TOTAL CA: CPT

## 2021-04-14 PROCEDURE — 36415 COLL VENOUS BLD VENIPUNCTURE: CPT

## 2021-04-14 RX ORDER — CLOPIDOGREL BISULFATE 75 MG/1
75 TABLET ORAL DAILY
Qty: 90 TAB | Refills: 3 | Status: SHIPPED | OUTPATIENT
Start: 2021-04-15 | End: 2022-02-14 | Stop reason: SDUPTHER

## 2021-04-14 RX ORDER — ATORVASTATIN CALCIUM 40 MG/1
40 TABLET, FILM COATED ORAL DAILY
Qty: 90 TAB | Refills: 11 | Status: SHIPPED | OUTPATIENT
Start: 2021-04-15 | End: 2022-03-31 | Stop reason: ALTCHOICE

## 2021-04-14 RX ADMIN — ASPIRIN 81 MG: 81 TABLET ORAL at 08:16

## 2021-04-14 RX ADMIN — ATORVASTATIN CALCIUM 40 MG: 40 TABLET, FILM COATED ORAL at 08:16

## 2021-04-14 RX ADMIN — CLOPIDOGREL BISULFATE 75 MG: 75 TABLET ORAL at 08:16

## 2021-04-14 NOTE — ROUTINE PROCESS
Cardiac Rehab: Spoke with patient regarding referral to cardiac rehab. Patient meets admission criteria based on PCI (4/13/21). Written information about Cardiac Rehab given and reviewed with patient. Discussed lifestyle modifications to promote cardiac wellness. Patient indicated that she wants to participate in the cardiac rehab program at the [facility] location which is closest to her home in [facility] [place]. We will forward her referal to the [facility] program as requested. Her Cardiologist is  [facility] [place].       Thank you,  KIERRA HardyN, RN  Cardiopulmonary Rehabilitation Nurse Liaison  [facility]

## 2021-04-14 NOTE — PROGRESS NOTES
[facility] April 14, 2021       RE: Roz Moraes      To Whom It May Concern,    This is to certify that Roz Moraes may return to work on 4/20/2021. Please feel free to contact my office if you have any questions or concerns. Thank you for your assistance in this matter.       Sincerely,        Bishop Neha NP   [facility]  [phone]

## 2021-04-14 NOTE — ROUTINE PROCESS
Bedside and Verbal shift change report given to [facility] (oncoming nurse) by self (offgoing nurse). Report included the following information SBAR, Kardex, MAR, Recent Results, Med Rec Status and Cardiac Rhythm NSR.

## 2021-04-14 NOTE — PROGRESS NOTES
Problem: Falls - Risk of  Goal: *Absence of Falls  Description: Document Radha Santillan Fall Risk and appropriate interventions in the flowsheet.   Outcome: Resolved/Met     Problem: Patient Education: Go to Patient Education Activity  Goal: Patient/Family Education  Outcome: Resolved/Met

## 2021-04-14 NOTE — PROGRESS NOTES
Pt is discharging home in stable condition. No d/c needs identified. Tx goals met. Care Management Interventions  PCP Verified by CM: Yes  Mode of Transport at Discharge:  Other (see comment)(Family)  Transition of Care Consult (CM Consult): Discharge Planning  Discharge Durable Medical Equipment: No  Physical Therapy Consult: No  Occupational Therapy Consult: No  Speech Therapy Consult: No  Current Support Network: [facility] [place], Own Home  Confirm Follow Up Transport: Family  The Plan for Transition of Care is Related to the Following Treatment Goals : Return to baseline  The Patient and/or Patient Representative was Provided with a Choice of Provider and Agrees with the Discharge Plan?: Yes  Name of the Patient Representative Who was Provided with a Choice of Provider and Agrees with the Discharge Plan: Patient  Freedom of Choice List was Provided with Basic Dialogue that Supports the Patient's Individualized Plan of Care/Goals, Treatment Preferences and Shares the Quality Data Associated with the Providers?: Yes   Resource Information Provided?: No  Discharge Location  Discharge Placement: Home

## 2021-04-14 NOTE — DISCHARGE SUMMARY
[facility] Discharge Summary     Patient ID:  Anish Lo  [id number]  20 y.o.  1965    Admit date: 4/13/2021    Discharge date:  4/14/2021    Admitting Physician: Ike Rosado MD     Discharge Physician: Dr. Claritza Ozuna    Admission Diagnoses: CAD (coronary artery disease) [I25.10]  Chest pain [R07.9]    Discharge Diagnoses:    Diagnosis    Tobacco abuse    Chest pain    Mixed hyperlipidemia    COPD (chronic obstructive pulmonary disease) ([facility] [address]    CAD (coronary artery disease)    PAD (peripheral artery disease) [facility])       Cardiology Procedures this admission:  Left heart catheterization with PCI  Consults: None    Hospital Course: Patient was seen at the office of [facility] by Dr. Claritza Ozuna for complaints of chest pain and was subsequently scheduled for [place] [facility] [place] [facility] [place]. Patient underwent cardiac catheterization by Dr. Claritza Ozuna. Patient was found to have 80% stenosis of the pLAD that was stented with a 2.5 x 18-mm Resolute Sizerock JAIME with 0% residual stenosis. Patient tolerated the procedure well and was taken to the telemetry floor for recovery. The morning of discharge, patient was up feeling well without any complaints of chest pain or shortness of breath. Patient's right radial cath site was clean, dry and intact without hematoma or bruit. Patient's labs were WNL. Patient was seen and examined by Dr. Claritza Ozuna and determined stable and ready for discharge. Patient was instructed on the importance of medication compliance including taking aspirin and Plavix everyday without missing a dose. After receiving drug eluting stents, the patient will remain on dual anti-platelet therapy for 1 year. For maximized medical therapy for CAD, patient will continue statin as well. The patient will follow up with [facility] -- Dr. Claritza Ozuna in 2-4 weeks. Patient has been referred to cardiac rehab.       DISPOSITION: The patient is being discharged home in stable condition on a low saturated fat, low cholesterol and low salt diet. The patient is instructed to advance activities as tolerated to the limit of fatigue or shortness of breath. The patient is instructed to avoid all heavy lifting for 3-5 days. The patient is instructed to watch the cath site for bleeding/oozing; if seen, the patient is instructed to apply firm pressure with a clean cloth and call [facility] at [phone]. The patient is instructed to watch for signs of infection which include: increasing area of redness, fever/hot to touch or purulent drainage at the catheterization site. The patient is instructed not to soak in a bathtub for 7-10 days, but is cleared to shower. The patient is instructed to call the office or return to the ER for immediate evaluation for any shortness of breath or chest pain not relieved by NTG. Discharge Exam: Patient has been seen by Dr. Dany Palma: see his progress note for exam details. Visit Vitals  /63   Pulse 65   Temp 98.1 °F (36.7 °C)   Resp 20   Ht 5' 6\" (1.676 m)   Wt 102.5 kg (226 lb)   SpO2 98%   Breastfeeding No   BMI 36.48 kg/m²       Recent Results (from the past 24 hour(s))   EKG, 12 LEAD, INITIAL    Collection Time: 04/13/21  7:11 AM   Result Value Ref Range    Ventricular Rate 67 BPM    Atrial Rate 67 BPM    P-R Interval 184 ms    QRS Duration 84 ms    Q-T Interval 418 ms    QTC Calculation (Bezet) 441 ms    Calculated P Axis 41 degrees    Calculated R Axis -6 degrees    Calculated T Axis 35 degrees    Diagnosis       Normal sinus rhythm  Normal ECG  When compared with ECG of 15-SEP-2020 08:00,  Left anterior fascicular block is no longer Present  Confirmed by [facility]  MD ()OSIEL ([id number]) on 4/13/2021 3:19:21 PM           Patient Instructions:     Current Discharge Medication List      START taking these medications    Details   atorvastatin (LIPITOR) 40 mg tablet Take 1 Tab by mouth daily.   Qty: 90 Tab, Refills: 11 clopidogreL (PLAVIX) 75 mg tab Take 1 Tab by mouth daily. Qty: 90 Tab, Refills: 3         CONTINUE these medications which have NOT CHANGED    Details   aspirin delayed-release 81 mg tablet Take  by mouth daily. glucose blood VI test strips (Accu-Chek Larisa Plus test strp) strip Test blood sugar qd, either fasting or 2 hours after eating for diabetes and record in log. (#100)  Dx: E11.9  Qty: 100 Strip, Refills: 11    Associated Diagnoses: Controlled type 2 diabetes mellitus without complication, without long-term current use of insulin ([facility])      Blood-Glucose Meter (Accu-Chek Larisa Plus Meter) misc Test blood sugar qd, either fasting or 2 hours after eating for diabetes and record in log. (#1)  Dx: E11.9  Qty: 1 Each, Refills: 1    Associated Diagnoses: Controlled type 2 diabetes mellitus without complication, without long-term current use of insulin (HCC)      Lancing Device with Lancets (Accu-Chek Soft Dev Lancets) kit Test blood sugar qd either fasting or 2 hours after eating for diabetes and record in log. (#1)  Dx: E11.9  Qty: 1 Kit, Refills: 11    Associated Diagnoses: Controlled type 2 diabetes mellitus without complication, without long-term current use of insulin ([facility])      lancets (Accu-Chek Softclix Lancets) misc Test blood sugar qd, either fasting or 2 hours after eating for diabetes and record in log. (#100)  Dx: E11.9  Qty: 1 Each, Refills: 11    Associated Diagnoses: Controlled type 2 diabetes mellitus without complication, without long-term current use of insulin ([facility])      krill oil 500 mg cap Take 500 mg by mouth daily. albuterol (PROVENTIL HFA, VENTOLIN HFA, PROAIR HFA) 90 mcg/actuation inhaler Take 2 Puffs by inhalation every six (6) hours as needed for Wheezing.   Qty: 1 Inhaler, Refills: 11    Associated Diagnoses: COPD (chronic obstructive pulmonary disease) ([address]

## 2021-04-14 NOTE — DISCHARGE INSTRUCTIONS
Patient Education   Patient Education        Coronary Angioplasty: What to Expect at Home  Your Recovery     Coronary angioplasty is a procedure that is used to open a narrowed or blocked coronary artery. It may also be called a percutaneous coronary intervention (PCI). The doctor opened your narrowed or blocked artery by putting a thin tube, called a catheter, into your heart through a blood vessel. The catheter was inserted into the blood vessel in your groin or arm. The doctor may have placed a small tube, called a stent, in the artery. Your groin or arm may have a bruise and feel sore for a day or two after the procedure. You can do light activities around the house. But don't do anything strenuous for a day or two. This care sheet gives you a general idea about how long it will take for you to recover. But each person recovers at a different pace. Follow the steps below to get better as quickly as possible. How can you care for yourself at home? Activity    · If the doctor gave you a sedative:  ? For 24 hours, don't do anything that requires attention to detail, such as going to work, making important decisions, or signing any legal documents. It takes time for the medicine's effects to completely wear off.  ? For your safety, do not drive or operate any machinery that could be dangerous. Wait until the medicine wears off and you can think clearly and react easily.     · Do not do strenuous exercise and do not lift, pull, or push anything heavy until your doctor says it is okay. This may be for a day or two. You can walk around the house and do light activity, such as cooking.     · If the catheter was placed in your groin, try not to walk up stairs for the first couple of days.     · If the catheter was placed in your arm near your wrist, do not bend your wrist deeply for the first couple of days.  Be careful using your hand to get into and out of a chair or bed.     · Carry your stent identification card with you at all times.     · If your doctor recommends it, get more exercise. Walking is a good choice. Bit by bit, increase the amount you walk every day. Try for at least 30 minutes on most days of the week.     · If you haven't been set up with a cardiac rehab program, talk to your doctor about whether rehab is right for you. Cardiac rehab includes supervised exercise. It also includes help with diet and lifestyle changes and emotional support. Diet    · Drink plenty of fluids to help your body flush out the dye. If you have kidney, heart, or liver disease and have to limit fluids, talk with your doctor before you increase the amount of fluids you drink.     · Keep eating a heart-healthy diet that has lots of fruits, vegetables, and whole grains. If you have not been eating this way, talk to your doctor. You also may want to talk to a dietitian. This expert can help you to learn about healthy foods and plan meals. Medicines    · Your doctor will tell you if and when you can restart your medicines. Your doctor will also give you instructions about taking any new medicines.     · If you take aspirin or some other blood thinner, ask your doctor if and when to start taking it again. Make sure that you understand exactly what your doctor wants you to do.     · Your doctor will prescribe blood-thinning medicines. You will likely take aspirin plus another antiplatelet, such as clopidogrel (Plavix). It is very important that you take these medicines exactly as directed. These medicines help keep the coronary artery open and reduce your risk of a heart attack.     · Call your doctor if you think you are having a problem with your medicine. Care of the catheter site    · For 1 or 2 days, keep a bandage over the spot where the catheter was inserted. The bandage probably will fall off in this time.     · Put ice or a cold pack on the area for 10 to 20 minutes at a time to help with soreness or swelling.  Put a thin cloth between the ice and your skin.     · You may shower 24 to 48 hours after the procedure, if your doctor okays it. Pat the incision dry.     · Do not soak the catheter site until it is healed. Don't take a bath for 1 week, or until your doctor tells you it is okay.     · Watch for bleeding from the site. A small amount of blood (up to the size of a quarter) on the bandage can be normal.     · If you are bleeding, lie down and press on the area for 15 minutes to try to make it stop. If the bleeding does not stop, call your doctor or seek immediate medical care. Follow-up care is a key part of your treatment and safety. Be sure to make and go to all appointments, and call your doctor if you are having problems. It's also a good idea to know your test results and keep a list of the medicines you take. When should you call for help? Call 911 anytime you think you may need emergency care. For example, call if:    · You passed out (lost consciousness).     · You have severe trouble breathing.     · You have sudden chest pain and shortness of breath, or you cough up blood.     · You have symptoms of a heart attack, such as:  ? Chest pain or pressure. ? Sweating. ? Shortness of breath. ? Nausea or vomiting. ? Pain that spreads from the chest to the neck, jaw, or one or both shoulders or arms. ? Dizziness or lightheadedness. ? A fast or uneven pulse. After calling 911, chew 1 adult-strength aspirin. Wait for an ambulance. Do not try to drive yourself.     · You have been diagnosed with angina, and you have angina symptoms that do not go away with rest or are not getting better within 5 minutes after you take one dose of nitroglycerin.    Call your doctor now or seek immediate medical care if:    · You are bleeding from the area where the catheter was put in your artery.     · You have a fast-growing, painful lump at the catheter site.     · You have signs of infection, such as:  ? Increased pain, swelling, warmth, or redness. ? Red streaks leading from the catheter site. ? Pus draining from the catheter site. ? A fever.     · Your leg, arm, or hand is painful, looks blue, or feels cold, numb, or tingly. Watch closely for changes in your health, and be sure to contact your doctor if you have any problems. Where can you learn more? Go to http://www.gray.com/  Enter X725 in the search box to learn more about \"Coronary Angioplasty: What to Expect at Home. \"  Current as of: August 31, 2020               Content Version: 12.8  © 2006-2021 [employer]. Care instructions adapted under license by [employer] (which disclaims liability or warranty for this information). If you have questions about a medical condition or this instruction, always ask your healthcare professional. [address] any warranty or liability for your use of this information. Left Heart Catheterization: About This Test  What is it? Left heart catheterization is a test to check the left side of your heart. Your doctor might look at the shape of your heart, the motion of your heart, or the blood pressure inside the chambers. Why is this test done? This test gives information about how your heart is working. It can:  · Check blood flow and blood pressure in the chambers of the heart. · Check the pumping action of the heart. · Find out if a heart defect is present and how severe it is. · Find out how well the heart valves work. How is the test done? · You will get medicine to help you relax. · A thin tube called a catheter is put into a blood vessel in the groin or the arm. The doctor moves the catheter through the blood vessel into your heart. · You will get a shot to numb the skin where the catheter goes in. · Dye may be injected into your heart. Your doctor can watch on special monitors as the dye moves in your heart.  The dye helps your doctor see blood flow in your heart. · If a heart defect is found, cardiac catheterization sometimes is used to correct it during the test.  · You will stay in a room for at least a few hours to make sure the catheter site starts to heal. You may have a bandage or a compression device on your groin or arm to prevent bleeding. · If the catheter was placed in your groin, you may lie in bed for a few hours. If the catheter was put in your arm, you will need to keep your arm still for at least 1 hour. How long does it take? The test will take about 30 minutes. If a problem is found and the doctor treats it, the test can take a few hours longer. What happens after the test?  · You may or may not need to stay in the hospital overnight. You will get more instructions for what to do at home. · Drink plenty of fluids for several hours after the test.  Follow-up care is a key part of your treatment and safety. Be sure to make and go to all appointments, and call your doctor if you are having problems. It's also a good idea to know your test results and keep a list of the medicines you take. Where can you learn more? Go to http://www.gray.com/  Enter W306 in the search box to learn more about \"Left Heart Catheterization: About This Test.\"  Current as of: August 31, 2020               Content Version: 12.8  © 2443-9771 [employer]. Care instructions adapted under license by [employer] (which disclaims liability or warranty for this information). If you have questions about a medical condition or this instruction, always ask your healthcare professional. [place] [address] any warranty or liability for your use of this information. Patient Education      Atorvastatin (Lipitor) - (By mouth)   Why this medicine is used:   Treats high cholesterol and triglyceride levels.  Reduces the risk of angina, stroke, heart attack, or certain heart and blood vessel problems. Contact a nurse or doctor right away if you have:  · Severe headache, confusion, trouble speaking  · Dark urine or pale stools  · Yellow skin or eyes  · Nausea, vomiting, loss of appetite, stomach pain  · Muscle pain, tenderness, or weakness; unusual tiredness     Common side effects:  · Diarrhea  · Joint pain  © 2017 [facility] Information is for End User's use only and may not be sold, redistributed or otherwise used for commercial purposes. Patient Education      Clopidogrel (Plavix) - (By mouth)   Why this medicine is used:   Helps prevent stroke, heart attack, and other heart problems. Contact a nurse or doctor right away if you have:  · Sudden or severe headache  · Bloody vomit or vomit that looks like coffee grounds; bloody or black, tarry stools  · Bleeding that does not stop or bruises that do not heal  · Dark urine or pale stools, nausea, loss of appetite, stomach pain,  · Yellow skin or eyes     Common side effects:  · Minor bleeding or bruising  © 2017 [employer] [facility] Information is for End User's use only and may not be sold, redistributed or otherwise used for commercial purposes.

## 2021-05-11 ENCOUNTER — HOSPITAL ENCOUNTER (OUTPATIENT)
Dept: CT IMAGING | Age: 56
Discharge: HOME | End: 2021-05-11
Attending: FAMILY MEDICINE
Payer: COMMERCIAL

## 2021-05-11 DIAGNOSIS — R31.9 HEMATURIA, UNSPECIFIED TYPE: ICD-10-CM

## 2021-05-11 PROCEDURE — 74176 CT ABD & PELVIS W/O CONTRAST: CPT

## 2021-10-30 ENCOUNTER — HOSPITAL ENCOUNTER (OUTPATIENT)
Dept: MRI IMAGING | Age: 56
Discharge: HOME | End: 2021-10-30
Attending: FAMILY MEDICINE
Payer: COMMERCIAL

## 2021-10-30 DIAGNOSIS — R15.9 INCONTINENCE OF FECES, UNSPECIFIED FECAL INCONTINENCE TYPE: ICD-10-CM

## 2021-10-30 DIAGNOSIS — M48.062 SPINAL STENOSIS, LUMBAR REGION WITH NEUROGENIC CLAUDICATION: ICD-10-CM

## 2021-10-30 DIAGNOSIS — G89.29 CHRONIC BILATERAL LOW BACK PAIN, UNSPECIFIED WHETHER SCIATICA PRESENT: ICD-10-CM

## 2021-10-30 DIAGNOSIS — M54.50 CHRONIC BILATERAL LOW BACK PAIN, UNSPECIFIED WHETHER SCIATICA PRESENT: ICD-10-CM

## 2021-10-30 PROCEDURE — 72148 MRI LUMBAR SPINE W/O DYE: CPT

## 2021-11-02 NOTE — PROGRESS NOTES
Some narrowing of the canal that the nerves travel through at the L4-5 region. This is similar to what Dr. Vann Brunner talk to you about. He said \"Pain management referral.  We can revisit surgery once she is cleared by cardiology to be off of blood thinner medications. \"    If she would like she can call and schedule a follow-up appointment with him.

## 2021-11-08 NOTE — PROGRESS NOTES
Patient Notified and stated that she has to be on blood thinners until at least the first of the year and she will schedule follow up after.

## 2021-11-29 PROBLEM — G47.33 OSA (OBSTRUCTIVE SLEEP APNEA): Status: ACTIVE | Noted: 2021-11-29

## 2021-11-29 PROBLEM — F17.200 NICOTINE DEPENDENCE: Status: ACTIVE | Noted: 2021-11-29

## 2021-12-08 ENCOUNTER — HOSPITAL ENCOUNTER (OUTPATIENT)
Dept: CT IMAGING | Age: 56
Discharge: HOME | End: 2021-12-08
Attending: NURSE PRACTITIONER
Payer: COMMERCIAL

## 2021-12-08 VITALS — WEIGHT: 229 LBS | BODY MASS INDEX: 35.94 KG/M2 | HEIGHT: 67 IN

## 2021-12-08 DIAGNOSIS — F17.218 CIGARETTE NICOTINE DEPENDENCE WITH OTHER NICOTINE-INDUCED DISORDER: ICD-10-CM

## 2021-12-08 PROCEDURE — 71271 CT THORAX LUNG CANCER SCR C-: CPT

## 2021-12-10 NOTE — PROGRESS NOTES
Please let her know that CT shows lungs are clear, no nodules or mass. Will discuss follow up CT in 1 year at her next appt.   Thanks

## 2021-12-22 ENCOUNTER — HOSPITAL ENCOUNTER (OUTPATIENT)
Dept: LAB | Age: 56
Discharge: HOME | End: 2021-12-22

## 2021-12-22 PROCEDURE — 88305 TISSUE EXAM BY PATHOLOGIST: CPT

## 2022-02-14 ENCOUNTER — HOSPITAL ENCOUNTER (OUTPATIENT)
Dept: LAB | Age: 57
Discharge: HOME | End: 2022-02-14
Payer: COMMERCIAL

## 2022-02-14 DIAGNOSIS — I25.10 CORONARY ARTERY DISEASE INVOLVING NATIVE HEART WITHOUT ANGINA PECTORIS, UNSPECIFIED VESSEL OR LESION TYPE: ICD-10-CM

## 2022-02-14 PROBLEM — E11.9 TYPE 2 DIABETES MELLITUS (HCC): Status: ACTIVE | Noted: 2022-02-14

## 2022-02-14 LAB
ANION GAP SERPL CALC-SCNC: 4 MMOL/L (ref 7–16)
BASOPHILS # BLD: 0 K/UL (ref 0–0.2)
BASOPHILS NFR BLD: 1 % (ref 0–2)
BUN SERPL-MCNC: 10 MG/DL (ref 6–23)
CALCIUM SERPL-MCNC: 9.5 MG/DL (ref 8.3–10.4)
CHLORIDE SERPL-SCNC: 108 MMOL/L (ref 98–107)
CO2 SERPL-SCNC: 25 MMOL/L (ref 21–32)
CREAT SERPL-MCNC: 0.7 MG/DL (ref 0.6–1)
DIFFERENTIAL METHOD BLD: ABNORMAL
EOSINOPHIL # BLD: 0.2 K/UL (ref 0–0.8)
EOSINOPHIL NFR BLD: 2 % (ref 0.5–7.8)
ERYTHROCYTE [DISTWIDTH] IN BLOOD BY AUTOMATED COUNT: 14.9 % (ref 11.9–14.6)
GLUCOSE SERPL-MCNC: 126 MG/DL (ref 65–100)
HCT VFR BLD AUTO: 43.8 % (ref 35.8–46.3)
HGB BLD-MCNC: 13.9 G/DL (ref 11.7–15.4)
IMM GRANULOCYTES # BLD AUTO: 0 K/UL (ref 0–0.5)
IMM GRANULOCYTES NFR BLD AUTO: 0 % (ref 0–5)
INR PPP: 0.9
LYMPHOCYTES # BLD: 2.5 K/UL (ref 0.5–4.6)
LYMPHOCYTES NFR BLD: 35 % (ref 13–44)
MCH RBC QN AUTO: 26.8 PG (ref 26.1–32.9)
MCHC RBC AUTO-ENTMCNC: 31.7 G/DL (ref 31.4–35)
MCV RBC AUTO: 84.4 FL (ref 79.6–97.8)
MONOCYTES # BLD: 0.5 K/UL (ref 0.1–1.3)
MONOCYTES NFR BLD: 7 % (ref 4–12)
NEUTS SEG # BLD: 3.9 K/UL (ref 1.7–8.2)
NEUTS SEG NFR BLD: 55 % (ref 43–78)
NRBC # BLD: 0 K/UL (ref 0–0.2)
PLATELET # BLD AUTO: 341 K/UL (ref 150–450)
PMV BLD AUTO: 9.9 FL (ref 9.4–12.3)
POTASSIUM SERPL-SCNC: 3.5 MMOL/L (ref 3.5–5.1)
PROTHROMBIN TIME: 12.9 SEC (ref 12.6–14.5)
RBC # BLD AUTO: 5.19 M/UL (ref 4.05–5.2)
SODIUM SERPL-SCNC: 137 MMOL/L (ref 136–145)
WBC # BLD AUTO: 7.2 K/UL (ref 4.3–11.1)

## 2022-02-14 PROCEDURE — 80048 BASIC METABOLIC PNL TOTAL CA: CPT

## 2022-02-14 PROCEDURE — 36415 COLL VENOUS BLD VENIPUNCTURE: CPT

## 2022-02-14 PROCEDURE — 85025 COMPLETE CBC W/AUTO DIFF WBC: CPT

## 2022-02-14 PROCEDURE — 85610 PROTHROMBIN TIME: CPT

## 2022-02-15 NOTE — PROGRESS NOTES
Patient pre-assessment complete for Beverly Andres scheduled for [facility], arrival time 0730. Patient verified using . Patient instructed to bring a list of all home medications on the day of procedure. NPO status reinforced. Patient informed to take a full dose aspirin 325mg  or 81 mg x 4 on the day of procedure. . Instructed they can take all other medications excluding vitamins & supplements. Patient verbalizes understanding of all instructions & denies any questions at this time.

## 2022-02-16 ENCOUNTER — HOSPITAL ENCOUNTER (OUTPATIENT)
Age: 57
Setting detail: OUTPATIENT SURGERY
Discharge: HOME | End: 2022-02-16
Attending: INTERNAL MEDICINE | Admitting: INTERNAL MEDICINE
Payer: COMMERCIAL

## 2022-02-16 VITALS
DIASTOLIC BLOOD PRESSURE: 56 MMHG | BODY MASS INDEX: 35.79 KG/M2 | SYSTOLIC BLOOD PRESSURE: 110 MMHG | WEIGHT: 228 LBS | HEIGHT: 67 IN | HEART RATE: 77 BPM | OXYGEN SATURATION: 95 % | TEMPERATURE: 97.4 F

## 2022-02-16 DIAGNOSIS — I25.10 CAD (CORONARY ARTERY DISEASE): ICD-10-CM

## 2022-02-16 DIAGNOSIS — I25.10 CORONARY ARTERY DISEASE INVOLVING NATIVE HEART WITHOUT ANGINA PECTORIS, UNSPECIFIED VESSEL OR LESION TYPE: ICD-10-CM

## 2022-02-16 LAB
ATRIAL RATE: 67 BPM
CALCULATED P AXIS, ECG09: 34 DEGREES
CALCULATED R AXIS, ECG10: 29 DEGREES
CALCULATED T AXIS, ECG11: 67 DEGREES
DIAGNOSIS, 93000: NORMAL
P-R INTERVAL, ECG05: 166 MS
Q-T INTERVAL, ECG07: 404 MS
QRS DURATION, ECG06: 92 MS
QTC CALCULATION (BEZET), ECG08: 426 MS
VENTRICULAR RATE, ECG03: 67 BPM

## 2022-02-16 PROCEDURE — 99152 MOD SED SAME PHYS/QHP 5/>YRS: CPT | Performed by: INTERNAL MEDICINE

## 2022-02-16 PROCEDURE — 77030015766: Performed by: INTERNAL MEDICINE

## 2022-02-16 PROCEDURE — 77030016699 HC CATH ANGI DX INFN1 CARD -A: Performed by: INTERNAL MEDICINE

## 2022-02-16 PROCEDURE — 74011000636 HC RX REV CODE- 636: Performed by: INTERNAL MEDICINE

## 2022-02-16 PROCEDURE — 93458 L HRT ARTERY/VENTRICLE ANGIO: CPT | Performed by: INTERNAL MEDICINE

## 2022-02-16 PROCEDURE — 93005 ELECTROCARDIOGRAM TRACING: CPT | Performed by: INTERNAL MEDICINE

## 2022-02-16 PROCEDURE — 74011000250 HC RX REV CODE- 250: Performed by: INTERNAL MEDICINE

## 2022-02-16 PROCEDURE — 74011250636 HC RX REV CODE- 250/636: Performed by: INTERNAL MEDICINE

## 2022-02-16 PROCEDURE — C1887 CATHETER, GUIDING: HCPCS | Performed by: INTERNAL MEDICINE

## 2022-02-16 PROCEDURE — C1769 GUIDE WIRE: HCPCS | Performed by: INTERNAL MEDICINE

## 2022-02-16 PROCEDURE — C1894 INTRO/SHEATH, NON-LASER: HCPCS | Performed by: INTERNAL MEDICINE

## 2022-02-16 PROCEDURE — 77030042317 HC BND COMPR HEMSTAT -B: Performed by: INTERNAL MEDICINE

## 2022-02-16 RX ORDER — SODIUM CHLORIDE 9 MG/ML
75 INJECTION, SOLUTION INTRAVENOUS CONTINUOUS
Status: CANCELLED | OUTPATIENT
Start: 2022-02-16

## 2022-02-16 RX ORDER — CLOPIDOGREL BISULFATE 75 MG/1
75 TABLET ORAL ONCE
Status: DISCONTINUED | OUTPATIENT
Start: 2022-02-16 | End: 2022-02-16 | Stop reason: HOSPADM

## 2022-02-16 RX ORDER — SODIUM CHLORIDE 9 MG/ML
75 INJECTION, SOLUTION INTRAVENOUS CONTINUOUS
Status: CANCELLED | OUTPATIENT
Start: 2022-02-16 | End: 2022-02-16

## 2022-02-16 RX ORDER — HEPARIN SODIUM 200 [USP'U]/100ML
INJECTION, SOLUTION INTRAVENOUS
Status: COMPLETED | OUTPATIENT
Start: 2022-02-16 | End: 2022-02-16

## 2022-02-16 RX ORDER — FENTANYL CITRATE 50 UG/ML
INJECTION, SOLUTION INTRAMUSCULAR; INTRAVENOUS AS NEEDED
Status: DISCONTINUED | OUTPATIENT
Start: 2022-02-16 | End: 2022-02-16 | Stop reason: HOSPADM

## 2022-02-16 RX ORDER — GUAIFENESIN 100 MG/5ML
324 LIQUID (ML) ORAL DAILY
Status: DISCONTINUED | OUTPATIENT
Start: 2022-02-16 | End: 2022-02-16 | Stop reason: HOSPADM

## 2022-02-16 RX ORDER — SODIUM CHLORIDE 0.9 % (FLUSH) 0.9 %
5-40 SYRINGE (ML) INJECTION EVERY 8 HOURS
Status: CANCELLED | OUTPATIENT
Start: 2022-02-16

## 2022-02-16 RX ORDER — SODIUM CHLORIDE 9 MG/ML
75 INJECTION, SOLUTION INTRAVENOUS CONTINUOUS
Status: DISCONTINUED | OUTPATIENT
Start: 2022-02-16 | End: 2022-02-16 | Stop reason: HOSPADM

## 2022-02-16 RX ORDER — MIDAZOLAM HYDROCHLORIDE 1 MG/ML
INJECTION, SOLUTION INTRAMUSCULAR; INTRAVENOUS AS NEEDED
Status: DISCONTINUED | OUTPATIENT
Start: 2022-02-16 | End: 2022-02-16 | Stop reason: HOSPADM

## 2022-02-16 RX ORDER — SODIUM CHLORIDE 0.9 % (FLUSH) 0.9 %
5-40 SYRINGE (ML) INJECTION AS NEEDED
Status: CANCELLED | OUTPATIENT
Start: 2022-02-16

## 2022-02-16 RX ORDER — LIDOCAINE HYDROCHLORIDE 10 MG/ML
INJECTION INFILTRATION; PERINEURAL AS NEEDED
Status: DISCONTINUED | OUTPATIENT
Start: 2022-02-16 | End: 2022-02-16 | Stop reason: HOSPADM

## 2022-02-16 RX ADMIN — SODIUM CHLORIDE 75 ML/HR: 900 INJECTION, SOLUTION INTRAVENOUS at 08:15

## 2022-02-16 NOTE — PROGRESS NOTES
Patient received to [facility] room # 12  Ambulatory from [facility]. Patient scheduled for [facility] today with Dr Hussein Fair. Procedure reviewed & questions answered, voiced good understanding consent obtained & placed on chart. All medications and medical history reviewed. Will prep patient per orders. Patient & family updated on plan of care.       The patient has a fraility score of 3-MANAGING WELL, based on ability to ambulate unassisted    324mg of Aspirin and 75mg of Plavix taken at 0700

## 2022-02-16 NOTE — DISCHARGE INSTRUCTIONS
HEART CATHETERIZATION/ANGIOGRAPHY DISCHARGE INSTRUCTIONS    1. Check puncture site frequently for swelling or bleeding. If there is any bleeding, apply pressure over the area with a clean towel or washcloth and call 911. Notify your doctor for any redness, swelling, drainage, or oozing from the puncture site. Notify your doctor for any fever or chills. 2. If the extremity becomes cold, numb, or painful call [facility] at [phone].  3. Activity should be limited for the next 48 hours. No heavy lifting, pushing, pulling  or strenuous activity for 48 hours. No heavy lifting (anything over 10 pounds) for 3 days. 4. You may resume your usual diet. Drink more fluids than usual.  5. Have a responsible person drive you home and stay with you for at least 24 hours after your heart catheterization/angiography. 6. You may remove bandage from your ARM in 24 hours. You may shower in 24 hours. No tub baths, hot tubs, or swimming for 1 week. Do not place any lotions, creams, powders, or ointments over puncture site for 1 week. You may place a clean band-aid over the puncture site each day for 5 days. Change daily. 7. Continue all medications as prescribed. Sedation for a Medical Procedure: Care Instructions     You were given a sedative medication during your visit. While many of the effects will have worn   off before you leave; you may continue to feel some effects for several hours. Common side effects from sedation include:  · Feeling sleepy. (Your doctors and nurses will make sure you are not too sleepy to go home.)  · Nausea and vomiting. This usually does not last long. · Feeling tired. How can you care for yourself at home? Activity    · Don't do anything for 24 hours that requires attention to detail. It takes time for the medicine effects to completely wear off. · Do not make important legal decisions for 24 hours. · Do not sign any legal documents for 24 hours.      · Do not drink alcohol today     · For your safety, you should not drive or operate heavy machinery for the remainder of the day     · Rest when you feel tired. Getting enough sleep will help you recover. Diet    · You can eat your normal diet, unless your doctor gives you other instructions. If your stomach is upset, try clear liquids and bland, low-fat foods like plain toast or rice. · Drink plenty of fluids (unless your doctor tells you not to). · Don't drink alcohol for 24 hours. Medicines    · Be safe with medicines. Read and follow all instructions on the label. · If the doctor gave you a prescription medicine for pain, take it as prescribed. · If you are not taking a prescription pain medicine, ask your doctor if you can take an over-the-counter medicine. · If you think your pain medicine is making you sick to your stomach:  · Take your medicine after meals (unless your doctor has told you not to). · Ask your doctor for a different pain medicine. I have read the above instructions and have had the opportunity to ask questions.       Patient: ________________________   Date: _____________    Witness: _______________________   Date: _____________

## 2022-02-16 NOTE — PROGRESS NOTES
TRANSFER - OUT REPORT:    Verbal report given to VANDA Hawley(name) on Dany Colon  being transferred to CPRU(unit) for routine progression of care       Report consisted of patients Situation, Background, Assessment and   Recommendations(SBAR). Information from the following report(s) SBAR was reviewed with the receiving nurse.     [facility] with Dr Mihir Flores  No interventions  R Radial  TR band at 14mL  Versed 2mg  Fentanyl 25mcg  Heparin 2000 units

## 2022-02-16 NOTE — PROGRESS NOTES
1110-patient ambulated to bath room with no difficulties. Right radial with no bleeding or hematoma. R band removed and sterile dressing applied to site    1115- all discharge instructions explained to patient and family. Time allowed for questions no. No questions and concerns at this time. 1120- right radial checked. Site with no redness or bleeding. pt discharged to home via Wheelchair with family.

## 2022-02-16 NOTE — PROGRESS NOTES
2/16/2022      RE: Hanna Ricardo      To Whom it May Concern: This is to certify that Hanna Ricardo may return to work on Monday, February 21 with no restrictions. Please feel free to contact my office if you have any questions or concerns. Thank you for your assistance in this matter.     Sincerely,    Dr Thomas Rosario, [facility]   [phone]  Riccardo Cranker, RN

## 2022-02-21 PROBLEM — D12.6 TUBULOVILLOUS ADENOMA OF COLON: Status: ACTIVE | Noted: 2022-02-21

## 2022-03-18 PROBLEM — R07.9 CHEST PAIN: Status: ACTIVE | Noted: 2017-04-11

## 2022-03-18 PROBLEM — M43.16 SPONDYLOLISTHESIS AT L4-L5 LEVEL: Status: ACTIVE | Noted: 2020-08-20

## 2022-03-18 PROBLEM — E11.9 TYPE 2 DIABETES MELLITUS (HCC): Status: ACTIVE | Noted: 2022-02-14

## 2022-03-18 PROBLEM — Z72.0 TOBACCO ABUSE: Status: ACTIVE | Noted: 2020-08-20

## 2022-03-19 PROBLEM — E66.812 OBESITY, CLASS II, BMI 35-39.9: Status: ACTIVE | Noted: 2019-08-14

## 2022-03-19 PROBLEM — E66.9 OBESITY, CLASS II, BMI 35-39.9: Status: ACTIVE | Noted: 2019-08-14

## 2022-03-19 PROBLEM — M48.9 CERVICAL SPINE DISEASE: Status: ACTIVE | Noted: 2017-04-17

## 2022-03-19 PROBLEM — E66.01 SEVERE OBESITY (HCC): Status: ACTIVE | Noted: 2020-08-20

## 2022-03-19 PROBLEM — D12.6 TUBULOVILLOUS ADENOMA OF COLON: Status: ACTIVE | Noted: 2022-02-21

## 2022-03-20 PROBLEM — F17.200 NICOTINE DEPENDENCE: Status: ACTIVE | Noted: 2021-11-29

## 2022-03-20 PROBLEM — G47.33 OSA (OBSTRUCTIVE SLEEP APNEA): Status: ACTIVE | Noted: 2021-11-29

## 2022-03-31 PROBLEM — I10 ESSENTIAL HYPERTENSION, BENIGN: Status: ACTIVE | Noted: 2022-03-31

## 2022-06-09 ENCOUNTER — OFFICE VISIT (OUTPATIENT)
Dept: FAMILY MEDICINE CLINIC | Facility: CLINIC | Age: 57
End: 2022-06-09
Payer: COMMERCIAL

## 2022-06-09 VITALS
BODY MASS INDEX: 34.78 KG/M2 | WEIGHT: 221.6 LBS | OXYGEN SATURATION: 95 % | HEIGHT: 67 IN | DIASTOLIC BLOOD PRESSURE: 75 MMHG | TEMPERATURE: 98.1 F | RESPIRATION RATE: 16 BRPM | HEART RATE: 67 BPM | SYSTOLIC BLOOD PRESSURE: 152 MMHG

## 2022-06-09 DIAGNOSIS — M79.671 RIGHT FOOT PAIN: ICD-10-CM

## 2022-06-09 DIAGNOSIS — J44.1 CHRONIC OBSTRUCTIVE PULMONARY DISEASE WITH ACUTE EXACERBATION (HCC): ICD-10-CM

## 2022-06-09 DIAGNOSIS — I73.9 PAD (PERIPHERAL ARTERY DISEASE) (HCC): ICD-10-CM

## 2022-06-09 DIAGNOSIS — Z79.899 ENCOUNTER FOR LONG-TERM (CURRENT) USE OF MEDICATIONS: ICD-10-CM

## 2022-06-09 DIAGNOSIS — M48.02 SPINAL STENOSIS IN CERVICAL REGION: ICD-10-CM

## 2022-06-09 DIAGNOSIS — F39 MOOD DISORDER (HCC): ICD-10-CM

## 2022-06-09 DIAGNOSIS — E11.9 TYPE 2 DIABETES MELLITUS WITHOUT COMPLICATION, WITHOUT LONG-TERM CURRENT USE OF INSULIN (HCC): ICD-10-CM

## 2022-06-09 DIAGNOSIS — G89.29 CHRONIC PAIN OF RIGHT HEEL: Primary | ICD-10-CM

## 2022-06-09 DIAGNOSIS — M48.061 SPINAL STENOSIS OF LUMBAR REGION, UNSPECIFIED WHETHER NEUROGENIC CLAUDICATION PRESENT: ICD-10-CM

## 2022-06-09 DIAGNOSIS — I25.83 CORONARY ARTERY DISEASE DUE TO LIPID RICH PLAQUE: ICD-10-CM

## 2022-06-09 DIAGNOSIS — I10 ESSENTIAL HYPERTENSION, BENIGN: ICD-10-CM

## 2022-06-09 DIAGNOSIS — E78.2 MIXED HYPERLIPIDEMIA: ICD-10-CM

## 2022-06-09 DIAGNOSIS — S29.012A STRAIN OF THORACIC BACK REGION: ICD-10-CM

## 2022-06-09 DIAGNOSIS — M79.671 CHRONIC PAIN OF RIGHT HEEL: Primary | ICD-10-CM

## 2022-06-09 DIAGNOSIS — Z72.0 TOBACCO ABUSE: ICD-10-CM

## 2022-06-09 DIAGNOSIS — R32 URINARY INCONTINENCE, UNSPECIFIED TYPE: ICD-10-CM

## 2022-06-09 DIAGNOSIS — I25.10 CORONARY ARTERY DISEASE DUE TO LIPID RICH PLAQUE: ICD-10-CM

## 2022-06-09 DIAGNOSIS — I10 ESSENTIAL HYPERTENSION: ICD-10-CM

## 2022-06-09 LAB
BILIRUBIN, URINE, POC: NEGATIVE
BLOOD URINE, POC: NORMAL
GLUCOSE URINE, POC: NEGATIVE
KETONES, URINE, POC: NEGATIVE
LEUKOCYTE ESTERASE, URINE, POC: NEGATIVE
NITRITE, URINE, POC: NEGATIVE
PH, URINE, POC: 6 (ref 4.6–8)
PROTEIN,URINE, POC: NORMAL
SPECIFIC GRAVITY, URINE, POC: 1.01 (ref 1–1.03)
URINALYSIS CLARITY, POC: CLEAR
URINALYSIS COLOR, POC: YELLOW
UROBILINOGEN, POC: NORMAL

## 2022-06-09 PROCEDURE — 81002 URINALYSIS NONAUTO W/O SCOPE: CPT | Performed by: FAMILY MEDICINE

## 2022-06-09 PROCEDURE — 3044F HG A1C LEVEL LT 7.0%: CPT | Performed by: FAMILY MEDICINE

## 2022-06-09 PROCEDURE — 99214 OFFICE O/P EST MOD 30 MIN: CPT | Performed by: FAMILY MEDICINE

## 2022-06-09 RX ORDER — ARIPIPRAZOLE 2 MG/1
TABLET ORAL
Qty: 30 TABLET | Refills: 3 | Status: SHIPPED | OUTPATIENT
Start: 2022-06-09 | End: 2022-10-04

## 2022-06-09 RX ORDER — BISOPROLOL FUMARATE 5 MG/1
5 TABLET ORAL DAILY
Qty: 30 TABLET | Refills: 3 | Status: SHIPPED | OUTPATIENT
Start: 2022-06-09 | End: 2022-10-04

## 2022-06-09 RX ORDER — ALBUTEROL SULFATE 90 UG/1
2 AEROSOL, METERED RESPIRATORY (INHALATION) EVERY 6 HOURS PRN
Qty: 18 G | Refills: 5 | Status: SHIPPED | OUTPATIENT
Start: 2022-06-09

## 2022-06-09 RX ORDER — PREDNISONE 20 MG/1
TABLET ORAL
Qty: 10 TABLET | Refills: 0 | Status: SHIPPED | OUTPATIENT
Start: 2022-06-09 | End: 2022-07-07 | Stop reason: ALTCHOICE

## 2022-06-09 ASSESSMENT — PATIENT HEALTH QUESTIONNAIRE - PHQ9
SUM OF ALL RESPONSES TO PHQ QUESTIONS 1-9: 17
SUM OF ALL RESPONSES TO PHQ QUESTIONS 1-9: 17
6. FEELING BAD ABOUT YOURSELF - OR THAT YOU ARE A FAILURE OR HAVE LET YOURSELF OR YOUR FAMILY DOWN: 1
7. TROUBLE CONCENTRATING ON THINGS, SUCH AS READING THE NEWSPAPER OR WATCHING TELEVISION: 3
5. POOR APPETITE OR OVEREATING: 3
SUM OF ALL RESPONSES TO PHQ QUESTIONS 1-9: 17
1. LITTLE INTEREST OR PLEASURE IN DOING THINGS: 3
8. MOVING OR SPEAKING SO SLOWLY THAT OTHER PEOPLE COULD HAVE NOTICED. OR THE OPPOSITE, BEING SO FIGETY OR RESTLESS THAT YOU HAVE BEEN MOVING AROUND A LOT MORE THAN USUAL: 0
2. FEELING DOWN, DEPRESSED OR HOPELESS: 3
SUM OF ALL RESPONSES TO PHQ QUESTIONS 1-9: 16
SUM OF ALL RESPONSES TO PHQ9 QUESTIONS 1 & 2: 6
3. TROUBLE FALLING OR STAYING ASLEEP: 0
4. FEELING TIRED OR HAVING LITTLE ENERGY: 3
10. IF YOU CHECKED OFF ANY PROBLEMS, HOW DIFFICULT HAVE THESE PROBLEMS MADE IT FOR YOU TO DO YOUR WORK, TAKE CARE OF THINGS AT HOME, OR GET ALONG WITH OTHER PEOPLE: 1
9. THOUGHTS THAT YOU WOULD BE BETTER OFF DEAD, OR OF HURTING YOURSELF: 1

## 2022-06-09 ASSESSMENT — COLUMBIA-SUICIDE SEVERITY RATING SCALE - C-SSRS
1. WITHIN THE PAST MONTH, HAVE YOU WISHED YOU WERE DEAD OR WISHED YOU COULD GO TO SLEEP AND NOT WAKE UP?: YES
2. HAVE YOU ACTUALLY HAD ANY THOUGHTS OF KILLING YOURSELF?: NO
6. HAVE YOU EVER DONE ANYTHING, STARTED TO DO ANYTHING, OR PREPARED TO DO ANYTHING TO END YOUR LIFE?: NO

## 2022-06-09 ASSESSMENT — ENCOUNTER SYMPTOMS
DIARRHEA: 0
COLOR CHANGE: 0
BACK PAIN: 1
VOMITING: 0
ABDOMINAL PAIN: 0
CONSTIPATION: 0
COUGH: 0
SHORTNESS OF BREATH: 0
NAUSEA: 0
WHEEZING: 0
RHINORRHEA: 0

## 2022-06-09 NOTE — PROGRESS NOTES
HISTORY OF PRESENT ILLNESS  Chief Complaint   Patient presents with    Depression     [id number]    Foot Pain     kicked a concrete barrier by accident in a parking lot a few months ago; still hurting has not had any kind of imaging    Back Pain     twisted back yesterday unloading her car    Forms     FMLA paperwork      kicked a concrete barrier by accident in a parking lot a few months ago; still hurting has not had any kind of imaging  Has been soaking it and propping it up and still hurting  Real sore aand swollen at the posterior heel area at the insertion of the achilles. Foot is 8/10. Right heel    Going up and down steps really painful    twisted back yesterday unloading her car - middle right side. Lifting and twisting at work all night long is not helping. Constant going from right to left. Was supposed to come back in 12/21 for recheck of uti. Having to wear a pad all the time. Is sleeping some. Last few weeks the reflux has been waking her up. Depression medications made her more depressed and gloomy  Head anc chest hurts. Cutting way back on smoking and soft drinks. Son and daughter are bipolar/ mood issues. PHQ-9  Little interest or pleasure in doing things: Nearly every day  Feeling down, depressed, or hopeless: Nearly every day  Trouble falling or staying asleep, or sleeping too much: Not at all  Feeling tired or having little energy: Nearly every day  Poor appetite or overeating: Nearly every day  Feeling bad about yourself - or that you are a failure or have let yourself or your family down: Several days  Trouble concentrating on things, such as reading the newspaper or watching television: Nearly every day  Moving or speaking so slowly that other people could have noticed.  Or the opposite - being so fidgety or restless that you have been moving around a lot more than usual: Not at all  Thoughts that you would be better off dead, or of hurting yourself in some way: Several days  PHQ-9 Total Score: 17  If you checked off any problems, how difficult have these problems made it for you to do your work, take care of things at home, or get along with other people?: Somewhat difficult      Foot Pain   Pertinent negatives include no fever or numbness. Back Pain  This is a new problem. The current episode started yesterday. The pain is present in the thoracic spine. The quality of the pain is described as stabbing. The pain does not radiate. The pain is at a severity of 7/10. Stiffness is present all day. Pertinent negatives include no abdominal pain, dysuria, fever, headaches, numbness, paresthesias, pelvic pain, weakness or weight loss. She has tried bed rest, NSAIDs, walking and muscle relaxant for the symptoms. Back Pain   The history is provided by the patient. This is a chronic problem. The problem has been gradually worsening. The problem occurs constantly. The pain is associated with lifting, twisting, excercise and walking. The pain is present in the middle back and right side. The pain is at a severity of 9/10. The pain is severe. The symptoms are aggravated by certain positions, twisting and bending. The pain is the same all the time. Associated symptoms include headaches, leg pain, paresthesias and tingling. Pertinent negatives include no chest pain, no fever, no numbness, no weight loss, no abdominal pain, no abdominal swelling, no bowel incontinence, no perianal numbness, no bladder incontinence, no dysuria, no pelvic pain, no paresis and no weakness. She has tried NSAIDs, analgesics, heat, muscle relaxants, anxiolytics and bed rest for the symptoms. The treatment provided mild relief. Incontinence  Associated symptoms include headaches and shortness of breath. Pertinent negatives include no chest pain and no abdominal pain. Diabetes  The history is provided by the patient. This is a chronic problem. The current episode started more than 1 week ago.  The problem breastfeeding. Pain Scale: 7/10 Pain Location: Back  Body mass index is 34.71 kg/m². Physical Exam  Vitals and nursing note reviewed. Constitutional:       General: She is not in acute distress. Appearance: Normal appearance. She is normal weight. She is not toxic-appearing. HENT:      Head: Normocephalic and atraumatic. Eyes:      General: Lids are normal.      Extraocular Movements: Extraocular movements intact. Conjunctiva/sclera: Conjunctivae normal.      Pupils: Pupils are equal, round, and reactive to light. Pupils are equal.   Cardiovascular:      Rate and Rhythm: Normal rate and regular rhythm. Pulses:           Dorsalis pedis pulses are 2+ on the right side. Posterior tibial pulses are 2+ on the right side. Heart sounds: Normal heart sounds. No murmur heard. No friction rub. No gallop. Pulmonary:      Effort: Pulmonary effort is normal. No respiratory distress. Breath sounds: Normal breath sounds. No wheezing or rales. Musculoskeletal:      Right lower leg: No edema. Left lower leg: No edema. Right foot: Normal range of motion. No deformity, bunion, Charcot foot, foot drop or prominent metatarsal heads. Feet:      Right foot:      Skin integrity: No dry skin. Toenail Condition: Right toenails are normal.   Skin:     General: Skin is warm and dry. Neurological:      General: No focal deficit present. Mental Status: She is alert. Psychiatric:         Mood and Affect: Mood normal.         Behavior: Behavior normal.         Thought Content: Thought content normal.         Judgment: Judgment normal.        XT foot unremarkable      ASSESSMENT and PLAN   Diagnosis Orders   1. Chronic pain of right heel  [address]   2. Urinary incontinence, unspecified type  AMB POC URINALYSIS DIP STICK MANUAL W/O MICRO   3. Right foot pain  XR FOOT RIGHT (2 VIEWS)   4.  Coronary artery disease due to lipid rich plaque     5. Essential hypertension, benign  Comprehensive Metabolic Panel   6. Type 2 diabetes mellitus without complication, without long-term current use of insulin (HCC)  Hemoglobin A1C   7. Tobacco abuse     8. Mixed hyperlipidemia  Lipid Panel   9. Encounter for long-term (current) use of medications  CBC with Auto Differential   10. PAD (peripheral artery disease) ([facility] [address]     11. Spinal stenosis of lumbar region, unspecified whether neurogenic claudication present     12. Spinal stenosis in cervical region     13. Strain of thoracic back region  predniSONE (DELTASONE) 20 MG tablet   14. Chronic obstructive pulmonary disease with acute exacerbation (HCC)  albuterol sulfate HFA (PROVENTIL;VENTOLIN;PROAIR) 108 (90 Base) MCG/ACT inhaler   15. Mood disorder (HCC)  ARIPiprazole (ABILIFY) 2 mg tablet   16. Essential hypertension  bisoprolol (ZEBETA) 5 MG tablet       Reviewed medications and side effects in detail    The patient brought in paperwork to be completed for work - [employer]. Those were completed and a copy placed on her chart. The current HTN medical regimen  is  effective. She will continue continue present plan and medications including:  Requested Prescriptions     Signed Prescriptions Disp Refills    albuterol sulfate HFA (PROVENTIL;VENTOLIN;PROAIR) 108 (90 Base) MCG/ACT inhaler 18 g 5     Sig: Inhale 2 puffs into the lungs every 6 hours as needed for Shortness of Breath    predniSONE (DELTASONE) 20 MG tablet 10 tablet 0     Si po bid for 3d and then 1 po qd    ARIPiprazole (ABILIFY) 2 mg tablet 30 tablet 3     Si po qam for mood.  bisoprolol (ZEBETA) 5 MG tablet 30 tablet 3     Sig: Take 1 tablet by mouth daily       contracts for safety. Her other chronic conditions are stable at this time. She is stable on the current treatment and tolerating it well. No significant sides effects. Will not adjust therapy at this time, unless noted above.    We will continue to monitor for

## 2022-06-09 NOTE — LETTER
Katherine Mccabe MD   [facility]  [address] [facility] [address]  Phone [phone]      Dear Cassie Nolasco:    My patient, Kailee Zimmerman 1965, is being followed for a medical problem or illness. She was seen at the office 6/9/2022     She has missed some time from work or school and should be excused for that time.     She will return to work on 6/20/2022    Sincerely,         Katherine Mccabe MD

## 2022-06-10 ENCOUNTER — TELEPHONE (OUTPATIENT)
Dept: FAMILY MEDICINE CLINIC | Facility: CLINIC | Age: 57
End: 2022-06-10

## 2022-06-10 DIAGNOSIS — M79.671 RIGHT FOOT PAIN: ICD-10-CM

## 2022-06-10 LAB
BASOPHILS # BLD: 0 K/UL (ref 0–0.2)
BASOPHILS NFR BLD: 0 % (ref 0–2)
CHOLEST SERPL-MCNC: 220 MG/DL
DIFFERENTIAL METHOD BLD: NORMAL
EOSINOPHIL # BLD: 0.2 K/UL (ref 0–0.8)
EOSINOPHIL NFR BLD: 3 % (ref 0.5–7.8)
ERYTHROCYTE [DISTWIDTH] IN BLOOD BY AUTOMATED COUNT: 14.5 % (ref 11.9–14.6)
EST. AVERAGE GLUCOSE BLD GHB EST-MCNC: 134 MG/DL
HBA1C MFR BLD: 6.3 % (ref 4.2–6.3)
HCT VFR BLD AUTO: 42.9 % (ref 35.8–46.3)
HDLC SERPL-MCNC: 39 MG/DL (ref 40–60)
HDLC SERPL: 5.6 {RATIO}
HGB BLD-MCNC: 13.6 G/DL (ref 11.7–15.4)
IMM GRANULOCYTES # BLD AUTO: 0 K/UL (ref 0–0.5)
IMM GRANULOCYTES NFR BLD AUTO: 0 % (ref 0–5)
LDLC SERPL CALC-MCNC: 152.2 MG/DL
LYMPHOCYTES # BLD: 2.2 K/UL (ref 0.5–4.6)
LYMPHOCYTES NFR BLD: 32 % (ref 13–44)
MCH RBC QN AUTO: 26.7 PG (ref 26.1–32.9)
MCHC RBC AUTO-ENTMCNC: 31.7 G/DL (ref 31.4–35)
MCV RBC AUTO: 84.3 FL (ref 79.6–97.8)
MONOCYTES # BLD: 0.4 K/UL (ref 0.1–1.3)
MONOCYTES NFR BLD: 7 % (ref 4–12)
NEUTS SEG # BLD: 4 K/UL (ref 1.7–8.2)
NEUTS SEG NFR BLD: 58 % (ref 43–78)
NRBC # BLD: 0 K/UL (ref 0–0.2)
PLATELET # BLD AUTO: 307 K/UL (ref 150–450)
PMV BLD AUTO: 10.1 FL (ref 9.4–12.3)
RBC # BLD AUTO: 5.09 M/UL (ref 4.05–5.2)
TRIGL SERPL-MCNC: 144 MG/DL (ref 35–150)
VLDLC SERPL CALC-MCNC: 28.8 MG/DL (ref 6–23)
WBC # BLD AUTO: 6.8 K/UL (ref 4.3–11.1)

## 2022-06-10 PROCEDURE — 73620 X-RAY EXAM OF FOOT: CPT | Performed by: FAMILY MEDICINE

## 2022-06-10 NOTE — TELEPHONE ENCOUNTER
[facility] paperwork faxed to [phone]. Confirmation received placed in scanning .  Copy placed in accordion file on MA desk

## 2022-06-12 NOTE — RESULT ENCOUNTER NOTE
Your cholesterol levels are elevated. Make sure you are eating a heart healthy nutritious diet, getting regular aerobic physical activity, maintaining desired appropriate body weight and avoiding tobacco products. Recheck lab work with an appointment in 3-6 months.   Other labs are normal.

## 2022-06-13 ENCOUNTER — TELEPHONE (OUTPATIENT)
Dept: FAMILY MEDICINE CLINIC | Facility: CLINIC | Age: 57
End: 2022-06-13

## 2022-06-13 NOTE — TELEPHONE ENCOUNTER
Contacted [employer], all that was needed was the patients name written at the top of every page. Refaxed confirmation received. Placed in [employer] file.

## 2022-06-13 NOTE — TELEPHONE ENCOUNTER
----- Message from Larry Ahumada sent at 6/13/2022  9:14 AM EDT -----  Subject: Message to Provider    QUESTIONS  Information for Provider? Patients name is missing on [facility] paperwork that   patient brought into her appointment on 06/08/22. name is missing and   needs employee info also filled out on the paperwork.  ---------------------------------------------------------------------------  --------------  Misha Butt INFO  What is the best way for the office to contact you? OK to leave message on   voicemail  Preferred Call Back Phone Number? [phone]  ---------------------------------------------------------------------------  --------------  SCRIPT ANSWERS  Relationship to Patient? Third Party  Third Party Type? ? Representative Name?  [employer]

## 2022-06-23 ENCOUNTER — OFFICE VISIT (OUTPATIENT)
Dept: ORTHOPEDIC SURGERY | Age: 57
End: 2022-06-23
Payer: COMMERCIAL

## 2022-06-23 DIAGNOSIS — M79.671 FOOT PAIN, RIGHT: Primary | ICD-10-CM

## 2022-06-23 DIAGNOSIS — F17.200 SMOKER: ICD-10-CM

## 2022-06-23 PROCEDURE — L1902 AFO ANKLE GAUNTLET PRE OTS: HCPCS | Performed by: ORTHOPAEDIC SURGERY

## 2022-06-23 PROCEDURE — 99406 BEHAV CHNG SMOKING 3-10 MIN: CPT | Performed by: ORTHOPAEDIC SURGERY

## 2022-06-23 PROCEDURE — A9999 DME SUPPLY OR ACCESSORY, NOS: HCPCS | Performed by: ORTHOPAEDIC SURGERY

## 2022-06-23 PROCEDURE — 99204 OFFICE O/P NEW MOD 45 MIN: CPT | Performed by: ORTHOPAEDIC SURGERY

## 2022-06-23 RX ORDER — METHYLPREDNISOLONE 4 MG/1
TABLET ORAL
Qty: 1 KIT | Refills: 0 | Status: SHIPPED | OUTPATIENT
Start: 2022-06-23 | End: 2022-07-07 | Stop reason: ALTCHOICE

## 2022-06-23 NOTE — PROGRESS NOTES
Patient was fitted and instructed on an [address] [place] [address] Avenue and Heel lift for the right foot. Patient read and signed documenting they understand and agree to [facility]'s current DME return policy.

## 2022-06-23 NOTE — PROGRESS NOTES
Name: Bandar Flores  YOB: 1965  Gender: female  MRN: [id number]    Summary: right insertion achilles tendonitis -heel lift, Achilles air heel brace-Medrol Dosepak Voltaren gel. This will therapy to start in 2 weeks. Wellbutrin given her smoking cravings. Cardiac disease, leg stents, smoker. CC: Heel pain     HPI: Bandar Flores is a 60 y.o. female who presents with increasing posterior ankle pain located at the Achilles tendon region. They do not remember any acute event that started. It has been going on for several months and has gotten worse. The pain as a burning tearing pain, tender to palpation and pressure with shoe wear, and it limits daily activities. I also described some swelling in this region also. History was obtained by patient    ROS/Meds/PSH/PMH/FH/SH: I personally reviewed the patients standard intake form. Below are the pertinents    Tobacco:  reports that she has been smoking. She has been smoking about 1.50 packs per day. She has never used smokeless tobacco.  Diabetes: None  Other: none    Physical Examination:  left Lower Extremity: FROM actively of toes, foot, ankle, knee and hip. No instability of foot or ankle with drawer and stress. 5/5 strength to TA/EHL/GSC/Peroneals/PTib. No skin lesions, Non TTP throughout. 2+ dp pulse w/ cap refill of 5 toes <2s. Dorsiflexion of the ankle produces pain at the Achilles insertion. There is some edema, mild erythema, and TTP at the Achilles at the same point of tenderness. Achilles has no defects but it is painful. Imaging:   I independently interpreted XR taken today and   3 views (AP/Lat/Oblique) of the on left ankle for achilles pain/ankle pain   Findings: no signs of acute fractures or dislocations. No signs of neoplastic process.   There is some age related degenerative changes noted in the ankle but these are minimal.  The achilles tendon at the insertion shows some calcifications and there is an enlarged calcaneal process indicative of a Dianne deformity. Impression:  Insertional calcific achilles tendonitis. Claudean Pili MD         Assessment:   insertion Achilles Tendonitis    Plan:     4 This is a chronic illness/condition with exacerbation and progression  Treatment at this time: Heel lifts and Achilles air heel pad and Prescription Drug Management  Studies ordered: NO XR needed @ Next Visit    Weight-bearing status: WBAT        Return to work/work restrictions: none  Voltaren Gel 1 tube 1% topical TID- I provided a prescription. I discussed with the patient that this is an NSAID, but due to its topical nature it has a low side effect profile. I discussed skin reactions, itching an irritation. If they develop any skin irritation, they are to stop the medication. and Medrol Dose pack (6 day oral taper): I discussed medrol being a steroid and how it can elevate blood sugars. I recommended to monitor sugars closely and to beware of symptoms such as lethargy, excessive thirst, polyuria, and GI distress. We also discussed the dose pack taper format and how long term steroid use can alter adrenal function. I also discussed steroids effect on depression and mood. How in some people it can exacerbate underlying depression while in others create a more manic response. Flaquito Olmos MD    no surgery today - proceed with non op    I discussed tobacco use with the patient for 3 minutes. The patient currently uses cigarettes. I discussed tobacco is associated with low bone mineral density, delayed fracture union, junior-implant bone loss, and implant failure. It also negatively affects blood flow and thus negatively affects wound healing. I discussed how smokers have increased pain and lower overall patient satisfaction, and significantly increased rates of wound healing complication. I recommend complete cessation of smoking.   However, I understand it is very difficult to quite smoking with out time and often help. The patient has decided to Start on Wellbutrin. I will provide her Wellbutrin to help her stop her cravings.

## 2022-07-07 ENCOUNTER — OFFICE VISIT (OUTPATIENT)
Dept: FAMILY MEDICINE CLINIC | Facility: CLINIC | Age: 57
End: 2022-07-07
Payer: COMMERCIAL

## 2022-07-07 VITALS
HEART RATE: 65 BPM | RESPIRATION RATE: 16 BRPM | TEMPERATURE: 97.1 F | DIASTOLIC BLOOD PRESSURE: 63 MMHG | BODY MASS INDEX: 34.91 KG/M2 | WEIGHT: 222.4 LBS | OXYGEN SATURATION: 96 % | HEIGHT: 67 IN | SYSTOLIC BLOOD PRESSURE: 129 MMHG

## 2022-07-07 DIAGNOSIS — I10 ESSENTIAL HYPERTENSION, BENIGN: ICD-10-CM

## 2022-07-07 DIAGNOSIS — E78.2 MIXED HYPERLIPIDEMIA: ICD-10-CM

## 2022-07-07 DIAGNOSIS — I25.10 CORONARY ARTERY DISEASE INVOLVING NATIVE HEART WITHOUT ANGINA PECTORIS, UNSPECIFIED VESSEL OR LESION TYPE: Primary | ICD-10-CM

## 2022-07-07 DIAGNOSIS — K21.9 GASTROESOPHAGEAL REFLUX DISEASE WITHOUT ESOPHAGITIS: ICD-10-CM

## 2022-07-07 DIAGNOSIS — E11.9 TYPE 2 DIABETES MELLITUS WITHOUT COMPLICATION, WITHOUT LONG-TERM CURRENT USE OF INSULIN (HCC): ICD-10-CM

## 2022-07-07 PROCEDURE — 3044F HG A1C LEVEL LT 7.0%: CPT | Performed by: FAMILY MEDICINE

## 2022-07-07 PROCEDURE — 99214 OFFICE O/P EST MOD 30 MIN: CPT | Performed by: FAMILY MEDICINE

## 2022-07-07 RX ORDER — ROSUVASTATIN CALCIUM 5 MG/1
5 TABLET, COATED ORAL NIGHTLY
Qty: 90 TABLET | Refills: 1 | Status: SHIPPED | OUTPATIENT
Start: 2022-07-07 | End: 2022-10-04

## 2022-07-07 RX ORDER — FAMOTIDINE 40 MG/1
TABLET, FILM COATED ORAL
Qty: 90 TABLET | Refills: 1 | Status: SHIPPED | OUTPATIENT
Start: 2022-07-07 | End: 2022-10-04

## 2022-07-07 ASSESSMENT — PATIENT HEALTH QUESTIONNAIRE - PHQ9
SUM OF ALL RESPONSES TO PHQ QUESTIONS 1-9: 2
2. FEELING DOWN, DEPRESSED OR HOPELESS: 1
SUM OF ALL RESPONSES TO PHQ QUESTIONS 1-9: 2
SUM OF ALL RESPONSES TO PHQ QUESTIONS 1-9: 2
SUM OF ALL RESPONSES TO PHQ9 QUESTIONS 1 & 2: 2
1. LITTLE INTEREST OR PLEASURE IN DOING THINGS: 1
SUM OF ALL RESPONSES TO PHQ QUESTIONS 1-9: 2

## 2022-07-07 ASSESSMENT — ENCOUNTER SYMPTOMS
BLURRED VISION: 1
SHORTNESS OF BREATH: 1
ORTHOPNEA: 0

## 2022-07-07 NOTE — PROGRESS NOTES
HISTORY OF PRESENT ILLNESS  Chief Complaint   Patient presents with    Hypertension     Previously on cholesterol med but it was giving her leg pain    Usually 130-140 at home  Always keeps a headache. Nothing seems to be bothering her. Everyone at work has covid  30 on 1st shift out with covid. Subjective:   Ceci Hurst is a 60 y.o. female with hypertension. Hypertension ROS: taking medications as instructed, no medication side effects noted, no TIA's, no chest pain on exertion, no dyspnea on exertion, no swelling of ankles. Key CAD CHF Meds          rosuvastatin (CRESTOR) 5 MG tablet (Taking)    Sig - Route: Take 1 tablet by mouth nightly - Oral    bisoprolol (ZEBETA) 5 MG tablet (Taking)    Sig - Route:  Take 1 tablet by mouth daily - Oral         Lab Results   Component Value Date/Time     06/09/2022 03:42 PM    K 3.9 06/09/2022 03:42 PM     06/09/2022 03:42 PM    CO2 25 06/09/2022 03:42 PM    BUN 10 06/09/2022 03:42 PM    CREATININE 0.70 06/09/2022 03:42 PM    GLUCOSE 94 06/09/2022 03:42 PM    CALCIUM 9.0 06/09/2022 03:42 PM       Lab Results   Component Value Date    CREATININE 0.70 06/09/2022      Lab Results   Component Value Date    CHOL 220 (H) 06/09/2022    CHOL 144 06/04/2021    CHOL 204 (H) 08/14/2019     Lab Results   Component Value Date    TRIG 144 06/09/2022    TRIG 134 06/04/2021    TRIG 231 (H) 08/14/2019     Lab Results   Component Value Date    HDL 39 (L) 06/09/2022    HDL 32 (L) 06/04/2021    HDL 39 (L) 08/14/2019     Lab Results   Component Value Date    LDLCALC 152.2 (H) 06/09/2022    LDLCALC 88 06/04/2021    LDLCALC 119 (H) 08/14/2019     Lab Results   Component Value Date    LABVLDL 28.8 (H) 06/09/2022    LABVLDL 46 (H) 08/14/2019    VLDL 24 06/04/2021     Lab Results   Component Value Date    CHOLHDLRATIO 5.6 06/09/2022      Lab Results   Component Value Date    LABMICR Comment 10/12/2021      BP Readings from Last 3 Encounters:   07/07/22 129/63 06/09/22 (!) 152/75   03/31/22 (!) 159/84        Worse with stress, salt. Improved with exercise, medication. She is  monitoring blood pressures. Wt Readings from Last 3 Encounters:   07/07/22 222 lb 6.4 oz (100.9 kg)   06/09/22 221 lb 9.6 oz (100.5 kg)   03/31/22 226 lb 12.8 oz (102.9 kg)          Hypertension  This is a chronic problem. The current episode started more than 1 month ago. The problem has been gradually improving since onset. The problem is controlled. Associated symptoms include anxiety, blurred vision, headaches and shortness of breath. Pertinent negatives include no malaise/fatigue, neck pain, orthopnea, palpitations, peripheral edema, PND or sweats. There are no associated agents to hypertension. Risk factors for coronary artery disease include diabetes mellitus, dyslipidemia, post-menopausal state, obesity, smoking/tobacco exposure, sedentary lifestyle and stress. The current treatment provides significant improvement. Review of Systems   Constitutional: Negative for activity change, appetite change, chills, fatigue, fever and malaise/fatigue. HENT: Negative for congestion and rhinorrhea. Eyes: Positive for blurred vision and visual disturbance. Respiratory: Positive for shortness of breath. Negative for cough and wheezing. Cardiovascular: Negative for palpitations, orthopnea and PND. Gastrointestinal: Negative for abdominal pain, constipation, diarrhea, nausea and vomiting. Genitourinary: Negative for dysuria. Musculoskeletal: Negative for arthralgias, myalgias and neck pain. Neurological: Positive for headaches. Negative for dizziness. Psychiatric/Behavioral: Negative for dysphoric mood. The patient is not nervous/anxious.        Patient Active Problem List   Diagnosis    Spondylolisthesis at L4-L5 level    Tobacco abuse    Type 2 diabetes mellitus ([facility] [address]    Chest pain    CAD (coronary artery disease)    Spinal stenosis, lumbar    PAD (peripheral artery disease) ([facility] [address]    Spinal stenosis in cervical region    Obesity, Class II, BMI 35-39.9    Chronic back pain    Tubulovillous adenoma of colon    Severe obesity ([address]    Cervical spine disease    Mixed hyperlipidemia    COPD (chronic obstructive pulmonary disease) (HCC)    Nicotine dependence    ARLENE (obstructive sleep apnea)    Essential hypertension, benign    Mood disorder (HCC)    Strain of thoracic back region         Blood pressure 129/63, pulse 65, temperature 97.1 °F (36.2 °C), resp. rate 16, height 5' 7\" (1.702 m), weight 222 lb 6.4 oz (100.9 kg), SpO2 96 %, not currently breastfeeding. Pain Scale: 6/10 Pain Location: Foot  Body mass index is 34.83 kg/m². Physical Exam  Constitutional:       General: She is not in acute distress. Appearance: Normal appearance. She is normal weight. She is not toxic-appearing. HENT:      Head: Normocephalic and atraumatic. Eyes:      Extraocular Movements: Extraocular movements intact. Conjunctiva/sclera: Conjunctivae normal.      Pupils: Pupils are equal, round, and reactive to light. Cardiovascular:      Heart sounds: Normal heart sounds. No murmur heard. No friction rub. No gallop. Pulmonary:      Effort: Pulmonary effort is normal. No respiratory distress. Breath sounds: Normal breath sounds. No wheezing or rales. Abdominal:      General: Abdomen is flat. Bowel sounds are normal. There is no distension or abdominal bruit. There are no signs of injury. Palpations: Abdomen is soft. There is no hepatomegaly or mass. Tenderness: There is no abdominal tenderness. Skin:     General: Skin is warm and dry. Neurological:      Mental Status: She is alert. Psychiatric:         Mood and Affect: Mood normal.         Behavior: Behavior normal.         Thought Content: Thought content normal.         Judgment: Judgment normal.              ASSESSMENT and PLAN   Diagnosis Orders   1.  Coronary artery disease involving native heart without angina pectoris, unspecified vessel or lesion type     2. Essential hypertension, benign     3. Type 2 diabetes mellitus without complication, without long-term current use of insulin ([facility] [address]     4. Mixed hyperlipidemia  rosuvastatin (CRESTOR) 5 MG tablet   5. Gastroesophageal reflux disease without esophagitis  famotidine (PEPCID) 40 MG tablet       Reviewed medications and side effects in detail    Thinks that when she gets her eyes checked that may show a cause of her headaches. Will schedule that. The current medical regimen  is  effective. She will continue present plan and medications including:  Requested Prescriptions     Signed Prescriptions Disp Refills    famotidine (PEPCID) 40 MG tablet 90 tablet 1     Sig: Take 1 Tablet by mouth daily. - Oral    rosuvastatin (CRESTOR) 5 MG tablet 90 tablet 1     Sig: Take 1 tablet by mouth nightly           Her other chronic conditions are stable at this time. She is stable on the current treatment and tolerating it well. No significant sides effects. Will not adjust therapy at this time, unless noted above. We will continue to monitor for any problems. Medications refilled and lab work has been ordered where needed. Reviewed medications are explained including any potential interactions or side effects in detail. The patient's questions were answered. She  understands the above and has no further questions. Further workup and treatment should be done if symptoms persist, worsen or new symptoms occur. She  will call to notify us of any problems, complications or worsening symptoms. Follow-up and Dispositions    · Return in about 6 months (around 1/7/2023). There are no Patient Instructions on file for this visit. (Some details in this note may have been created with speech-recognition software. Some errors in speech recognition may have occurred.    Most of those have been corrected but some may have been missed.)         Jennifer Andrade Lanette Bautista MD  [facility]  [address] [place] [address]  Phone [phone]

## 2022-07-11 ENCOUNTER — TELEPHONE (OUTPATIENT)
Dept: ORTHOPEDIC SURGERY | Age: 57
End: 2022-07-11

## 2022-07-11 NOTE — TELEPHONE ENCOUNTER
Pt did not show for their scheduled therapy appointment today.     Reason: unknown  Communication: LARY

## 2022-07-12 LAB
ALBUMIN SERPL-MCNC: 3.7 G/DL (ref 3.5–5)
ALBUMIN/GLOB SERPL: 1 {RATIO} (ref 1.1–2.2)
ALP SERPL-CCNC: 122 U/L (ref 50–136)
ALT SERPL-CCNC: 34 U/L (ref 30–65)
ANION GAP SERPL CALC-SCNC: 6 MMOL/L (ref 5–15)
AST SERPL-CCNC: 18 U/L (ref 15–37)
BILIRUB SERPL-MCNC: 0.4 MG/DL
BUN SERPL-MCNC: 10 MG/DL (ref 6–20)
CALCIUM SERPL-MCNC: 9 MG/DL (ref 8.5–10.1)
CHLORIDE SERPL-SCNC: 108 MMOL/L (ref 97–108)
CO2 SERPL-SCNC: 25 MMOL/L (ref 21–32)
CREAT SERPL-MCNC: 0.7 MG/DL (ref 0.55–1.02)
GLOBULIN SER CALC-MCNC: 3.7 G/DL (ref 2–4)
GLUCOSE SERPL-MCNC: 94 MG/DL (ref 50–100)
POTASSIUM SERPL-SCNC: 3.9 MMOL/L (ref 3.5–5.1)
PROT SERPL-MCNC: 7.4 G/DL (ref 6.4–8.2)
SODIUM SERPL-SCNC: 139 MMOL/L (ref 136–145)

## 2022-07-13 ASSESSMENT — ENCOUNTER SYMPTOMS
ABDOMINAL PAIN: 0
CONSTIPATION: 0
NAUSEA: 0
COUGH: 0
VOMITING: 0
DIARRHEA: 0
RHINORRHEA: 0
WHEEZING: 0

## 2022-08-27 ENCOUNTER — HOSPITAL ENCOUNTER (OUTPATIENT)
Dept: LAB | Age: 57
Discharge: HOME | End: 2022-08-30

## 2022-10-04 ENCOUNTER — OFFICE VISIT (OUTPATIENT)
Dept: CARDIOLOGY CLINIC | Age: 57
End: 2022-10-04
Payer: COMMERCIAL

## 2022-10-04 VITALS
HEIGHT: 66 IN | WEIGHT: 219 LBS | HEART RATE: 68 BPM | SYSTOLIC BLOOD PRESSURE: 140 MMHG | DIASTOLIC BLOOD PRESSURE: 90 MMHG | BODY MASS INDEX: 35.2 KG/M2

## 2022-10-04 DIAGNOSIS — I10 ESSENTIAL HYPERTENSION, BENIGN: ICD-10-CM

## 2022-10-04 DIAGNOSIS — I25.10 CORONARY ARTERY DISEASE INVOLVING NATIVE CORONARY ARTERY OF NATIVE HEART WITHOUT ANGINA PECTORIS: Primary | ICD-10-CM

## 2022-10-04 DIAGNOSIS — E78.2 MIXED HYPERLIPIDEMIA: ICD-10-CM

## 2022-10-04 DIAGNOSIS — I73.9 PAD (PERIPHERAL ARTERY DISEASE) (HCC): ICD-10-CM

## 2022-10-04 PROCEDURE — 99214 OFFICE O/P EST MOD 30 MIN: CPT | Performed by: INTERNAL MEDICINE

## 2022-10-04 NOTE — PROGRESS NOTES
[facility] CARDIOLOGY  [address] [facility], [address]  PHONE: [phone]      10/04/22    NAME:  Mallory Maldonado  : 1965  MRN: [id number]       SUBJECTIVE:   Mallory Maldonado is a 59 y.o. female seen for a follow up visit regarding the following:     Chief Complaint   Patient presents with    Coronary Artery Disease         HPI:  Chronic cp persists around clavicle. Holder- continue to smoke. No orthopnea or pnd. No palpitations or syncope. NO claudication. Past Medical History, Past Surgical History, Family history, Social History, and Medications were all reviewed with the patient today and updated as necessary. Current Outpatient Medications   Medication Sig Dispense Refill    albuterol sulfate HFA (PROVENTIL;VENTOLIN;PROAIR) 108 (90 Base) MCG/ACT inhaler Inhale 2 puffs into the lungs every 6 hours as needed for Shortness of Breath 18 g 5    aspirin 81 MG EC tablet Take 81 mg by mouth daily      clopidogrel (PLAVIX) 75 MG tablet Take 75 mg by mouth daily       No current facility-administered medications for this visit. Social History     Tobacco Use    Smoking status: Every Day     Packs/day: 1.50     Types: Cigarettes    Smokeless tobacco: Never    Tobacco comments:     Quit smoking: taking chantix   Substance Use Topics    Alcohol use: Not Currently              PHYSICAL EXAM:   BP (!) 140/90   Pulse 68   Ht 5' 6\" (1.676 m)   Wt 219 lb (99.3 kg)   BMI 35.35 kg/m²    Constitutional: Oriented to person, place, and time. Appears well-developed and well-nourished. Head: Normocephalic and atraumatic. Neck: Neck supple. Cardiovascular: Normal rate and regular rhythm with no murmur -No JVP  Pulmonary/Chest: Breath sounds normal.   Abdominal: Soft. Musculoskeletal: No edema. Neurological: Alert and oriented to person, place, and time. Skin: Skin is warm and dry. Psychiatric: Normal mood and affect. Vitals reviewed.       Wt Readings from Last 3 Encounters:   10/04/22 219 lb (99.3 kg)   07/07/22 222 lb 6.4 oz (100.9 kg)   06/09/22 221 lb 9.6 oz (100.5 kg)       Medical problems and test results were reviewed with the patient today. ASSESSMENT and PLAN    1. Coronary artery disease involving native coronary artery of native heart without angina pectoris  Stable. Continue asa and plavix- mild cad at cath 2/2022      2. Mixed hyperlipidemia  Stable. Continues to not tolerate statins  Ldl 152- start repatha 140 every two weeks    3. Essential hypertension, benign  Stable. Continue to monitor      4. PAD (peripheral artery disease) (HCC)  Stable. Continue asa and plavix         Return in about 6 months (around 4/4/2023).          Belkis Kat MD  10/4/2022  1:07 PM

## 2022-10-06 ENCOUNTER — TELEPHONE (OUTPATIENT)
Dept: CARDIOLOGY CLINIC | Age: 57
End: 2022-10-06

## 2022-10-06 NOTE — TELEPHONE ENCOUNTER
Deborah Carter from [facility] prescription advocacy is calling about the patient assistance for her repatha. Please call back when you can.

## 2022-10-10 NOTE — TELEPHONE ENCOUNTER
Spoke with [employer] States they faxed pt assistance form on Friday. Informed we have not received it yet but will fax back once completed. She voiced understanding.

## 2022-10-21 ENCOUNTER — TELEPHONE (OUTPATIENT)
Dept: CARDIOLOGY CLINIC | Age: 57
End: 2022-10-21

## 2022-10-21 NOTE — TELEPHONE ENCOUNTER
Alex said they received paper work but the application was blank where the RX is.    Fax # [phone]      Sheela Ferrara called and said she had spoken to [employer] \[facility] [place] [facility]\""TL

## 2022-11-09 ENCOUNTER — OFFICE VISIT (OUTPATIENT)
Dept: PULMONOLOGY | Age: 57
End: 2022-11-09
Payer: COMMERCIAL

## 2022-11-09 VITALS
BODY MASS INDEX: 36 KG/M2 | HEIGHT: 66 IN | WEIGHT: 224 LBS | HEART RATE: 71 BPM | OXYGEN SATURATION: 99 % | TEMPERATURE: 96.4 F | DIASTOLIC BLOOD PRESSURE: 88 MMHG | SYSTOLIC BLOOD PRESSURE: 128 MMHG

## 2022-11-09 DIAGNOSIS — F17.218 CIGARETTE NICOTINE DEPENDENCE WITH OTHER NICOTINE-INDUCED DISORDER: ICD-10-CM

## 2022-11-09 DIAGNOSIS — J43.9 PULMONARY EMPHYSEMA, UNSPECIFIED EMPHYSEMA TYPE (HCC): ICD-10-CM

## 2022-11-09 DIAGNOSIS — J44.9 COPD, MILD (HCC): Primary | ICD-10-CM

## 2022-11-09 LAB
EXPIRATORY TIME: NORMAL
FEF 25-75% %PRED-PRE: NORMAL
FEF 25-75% PRED: NORMAL
FEF 25-75%-PRE: NORMAL
FEV1 %PRED-PRE: 65 %
FEV1 PRED: NORMAL
FEV1/FVC %PRED-PRE: NORMAL
FEV1/FVC PRED: NORMAL
FEV1/FVC: 75 %
FEV1: 1.8 L
FVC %PRED-PRE: 67 %
FVC PRED: NORMAL
FVC: 2.39 L
PEF %PRED-PRE: NORMAL
PEF PRED: NORMAL
PEF-PRE: NORMAL

## 2022-11-09 PROCEDURE — 3078F DIAST BP <80 MM HG: CPT | Performed by: NURSE PRACTITIONER

## 2022-11-09 PROCEDURE — 94010 BREATHING CAPACITY TEST: CPT | Performed by: NURSE PRACTITIONER

## 2022-11-09 PROCEDURE — 3074F SYST BP LT 130 MM HG: CPT | Performed by: NURSE PRACTITIONER

## 2022-11-09 PROCEDURE — 99214 OFFICE O/P EST MOD 30 MIN: CPT | Performed by: NURSE PRACTITIONER

## 2022-11-09 RX ORDER — ALBUTEROL SULFATE 90 UG/1
AEROSOL, METERED RESPIRATORY (INHALATION)
Qty: 3 EACH | Refills: 3 | Status: SHIPPED | OUTPATIENT
Start: 2022-11-09

## 2022-11-09 ASSESSMENT — PULMONARY FUNCTION TESTS
FEV1_PERCENT_PREDICTED_PRE: 65
FEV1/FVC: 75
FVC: 2.39
FVC_PERCENT_PREDICTED_PRE: 67
FEV1: 1.80

## 2022-11-09 ASSESSMENT — ENCOUNTER SYMPTOMS
CHEST TIGHTNESS: 0
COUGH: 1
SHORTNESS OF BREATH: 1
WHEEZING: 0

## 2022-11-09 NOTE — PROGRESS NOTES
She is a pleasant 59 yr old female seen today due to COPD, shortness of breath,  tobacco use. Complete pulmonary function test demonstrated borderline restriction but this was not confirmed by lung volumes. There was mild reduction in diffusion capacity and no significant bronchodilator response. Screening CT demonstrated no worrisome nodules, minimal scarring at the right lung base. There was no evidence of interstitial disease. Mild emphysematous changes, upper lobe predominant. History also notable for COVID 1/2021 - had mild symptoms;  CAD and stent. She is trying to quit smoking, nicotine patch was ineffective for her. Had LDCT of chest 12/8/2021 which demonstrated clear lungs, no nodule or mass. Follow-up LDCT is recommended in 1 year. DIAGNOSTICS:     Chest CT 8/22/2013-no pulmonary infiltrate or nodule identified. CXR 10/20/2020-lungs are expanded without evidence of acute cardiopulmonary process. Spirometry 10/2020-suggestive of mild restrictive defect, although technically limited. Ambulatory oximetry 10/20/2020-baseline saturation 98% room air at rest, 97% room air with ambulation. Baseline heart rate 73 and maximum 95 with ambulation. LDCT 10/28/2020 -mild upper lobe predominant emphysematous changes. No evidence of nodule. Minimal scarring and/or atelectasis. CPFT's 11/19/2020 - Borderline restriction not confirmed by lung volumes. Mild reduction in diffusion capacity. No bronchodilator response. Normal alpha-1 level/genotype 12/2020. PSG 12/21/2020-mild obstructive sleep apnea becoming moderate with REM sleep and associated with moderate hypoxia. Insomnia. Inadequate sleep hygiene. Sleep related movement disorder. Spirometry 5/28/2021-mild restrictive defect, no significant change. LDCT of chest 12/8/2021 clear lungs, no nodule or mass.

## 2022-11-09 NOTE — PROGRESS NOTES
[address] [facility]. [address], [address] [facility]  [phone]    Patient Name:  Marry Sparrow    YOB: 1965    Office Visit 11/9/2022      CHIEF COMPLAINT:      Chief Complaint   Patient presents with    COPD    Follow-up         ASSESSMENT:   (Medical Decision Making)                                                                                                                                          Encounter Diagnoses   Name Primary? COPD, mild (HCC) Yes    Cigarette nicotine dependence with other nicotine-induced disorder     Pulmonary emphysema, unspecified emphysema type ([address]      There has been trend toward decline in FVC and FEV1. There were issues with insurance coverage for stiolto. She has been on albuterol alone as needed. Reports that insurance is such as that we need to prescribe inhaler, she has to go through denial/appeal process for insurance to determine if that will pay for it or not. Reviewed emphysematous changes noted on CT. Lung nodules or mass. Continues to meet criteria for screening CT, she is agreeable. Discussed current guidelines for screening for lung cancer, annual screening LDCT from age 50-69 or until tobacco free for 15 years. Pack-year history -43 years x1-1/2 packs/day. Tobacco status -has been able to quit for periods of time but has resumed. Discussed risk and benefits of screening, currently exhibits no signs and symptoms suggestive of lung cancer. Discussed importance of compliance with annual lung cancer screenings and willingness to undergo diagnosis and treatment if screening scan is positive. In addition, the patient was counseled regarding remaining tobacco free/total smoking cessation. Counseled in smoking cessation, have discussed multiple alternatives in past and again today. Referred her to nicotine Anonymous online information.      Discussed grief process, measures to aid with coping 12/21/2020-mild obstructive sleep apnea becoming moderate with REM sleep and associated with moderate hypoxia. Insomnia. Inadequate sleep hygiene. Sleep related movement disorder. Spirometry 5/28/2021-mild restrictive defect, no significant change. LDCT of chest 12/8/2021 clear lungs, no nodule or mass. Spirometry 11/9/2022-mild-moderate restrictive defect, trend toward worsening in FVC and FEV1.  _____________________________________________________________      REVIEW OF SYSTEMS:    Review of Systems   Constitutional:  Negative for chills, fatigue, fever and unexpected weight change. Respiratory:  Positive for cough and shortness of breath. Negative for chest tightness and wheezing. Cardiovascular:  Negative for chest pain, palpitations and leg swelling. Neurological:  Negative for dizziness, tremors, seizures, weakness and headaches. Psychiatric/Behavioral:          Dealing with loss of son in law, situational stressors     PHYSICAL EXAM:    Vitals:    11/09/22 1529   BP: 128/88   Pulse: 71   Temp: (!) 96.4 °F (35.8 °C)   TempSrc: Skin   SpO2: 99%   Weight: 224 lb (101.6 kg)   Height: 5' 6\" (1.676 m)        Body mass index is 36.15 kg/m². GENERAL APPEARANCE:  The patient is obese and in no respiratory distress. HEENT:  PERRL. Conjunctivae unremarkable. NECK/LYMPHATIC:   Symmetrical with no elevation of jugular venous pulsation. Trachea midline. LUNGS:   Normal respiratory effort with symmetrical lung expansion. Breath sounds - decreased but clear. HEART:   There is a normal rate and regular rhythm. No murmur, rub, or gallop. There is no edema in the lower extremities. NEURO:  The patient is alert and oriented to person, place, and time. Memory appears intact and mood is normal.  No gross sensorimotor deficits are present. DIAGNOSTIC TESTS: Studies were personally reviewed by me and discussed with the patient.     Spirometry:    5/28/2021:        Today:    Office Spirometry Results Latest Ref Rng & Units 11/9/2022   FVC L 2.39   FEV1 L 1.80   FEV1 %PRED-PRE % 65   FVC %PRED-PRE % 67   FEV1/FVC % 75             LDCT SCREENING:    CT LUNG SCREENING 12/08/2021    Narrative  CT lung screening    INDICATION:  30+ pack-year smoking history    Multiple axial images were obtained through the chest without IV contrast.  Low  dose CT protocol was used. Radiation dose reduction techniques were used for  this study: All CT scans performed at this facility use one or all of the  following: Automated exposure control, adjustment of the mA and/or kVp according  to patient's size, iterative reconstruction. FINDINGS:  Lungs are clear. No infiltrate, consolidation or mass. No pleural fluid. Heart  is normal in size. Moderate coronary artery calcification is noted. Probable  coronary artery stent along the LAD distribution. Esophagus is unremarkable. There is no significant mediastinal or axillary adenopathy. There are no bony  lesions. Limited imaging of the upper abdomen is unremarkable. Impression  1. Lungs are clear without nodule or mass. No pleural fluid. Category 1:  Negative-consider annual low-dose chest CT until patient no longer eligible for  definitive treatment  2. No enlarged lymph nodes. Coronary artery calcification is noted with coronary  artery stent in place. Past Medical History:   Diagnosis Date    CAD (coronary artery disease) 04/2021    s/p PTCA with stent placement    Chronic back pain     COPD (chronic obstructive pulmonary disease) ([address] 2/12/2013    Degenerative disc disease, cervical     Essential hypertension, benign 3/31/2022    GERD (gastroesophageal reflux disease)     Mixed hyperlipidemia 2/12/2013    Morbid obesity ([address]     PAD (peripheral artery disease) ([address]     stent in left leg (9/2020).  Not felt to need Plavix any longer    Sleep apnea     Spinal stenosis, lumbar     Surgery planned with Dr. Damien Saint -postponed    Tubulovillous adenoma of colon 2022    Type 2 diabetes mellitus ([facility] 2022       Patient Active Problem List   Diagnosis    Spondylolisthesis at L4-L5 level    Tobacco abuse    Type 2 diabetes mellitus (HCC)    Chest pain    CAD (coronary artery disease)    Spinal stenosis, lumbar    PAD (peripheral artery disease) (HCC)    Spinal stenosis in cervical region    Obesity, Class II, BMI 35-39.9    Chronic back pain    Tubulovillous adenoma of colon    Severe obesity (HCC)    Cervical spine disease    Mixed hyperlipidemia    COPD (chronic obstructive pulmonary disease) ([facility])    Nicotine dependence    ARLENE (obstructive sleep apnea)    Essential hypertension, benign    Mood disorder (HCC)    Strain of thoracic back region       Past Surgical History:   Procedure Laterality Date    CHOLECYSTECTOMY      CHOLECYSTECTOMY, LAPAROSCOPIC      HYSTERECTOMY (CERVIX STATUS UNKNOWN)      partial    OTHER SURGICAL HISTORY      common iliac artery stent    AK CARDIAC SURG PROCEDURE UNLIST      stents x1     VASCULAR SURGERY           Social History     Socioeconomic History    Marital status:      Spouse name: None    Number of children: None    Years of education: None    Highest education level: None   Tobacco Use    Smoking status: Every Day     Packs/day: 1.50     Types: Cigarettes    Smokeless tobacco: Never    Tobacco comments:     Quit smoking: taking chantix   Vaping Use    Vaping Use: Never used   Substance and Sexual Activity    Alcohol use: Not Currently    Drug use: No       Family History   Problem Relation Age of Onset    Heart Disease Father          from CHF at 64       Allergies   Allergen Reactions    Hydrochlorothiazide Other (See Comments)     headache    Lamotrigine Other (See Comments)     Agitation    Venlafaxine Other (See Comments)     HEADACHES       Current Outpatient Medications   Medication Sig    Evolocumab 140 MG/ML SOAJ Inject 140 mg into the skin every 14 days    albuterol sulfate HFA (PROVENTIL;VENTOLIN;PROAIR) 108 (90 Base) MCG/ACT inhaler Inhale 2 puffs into the lungs every 6 hours as needed for Shortness of Breath    aspirin 81 MG EC tablet Take 81 mg by mouth daily    clopidogrel (PLAVIX) 75 MG tablet Take 75 mg by mouth daily     No current facility-administered medications for this visit. Over 50% of today's office visit was spent in face to face time reviewing test results, prognosis, importance of compliance, education about disease process, benefits of medications, instructions for management of acute symptoms, and follow up plans. Electronically signed. Dictated using voice recognition software.   Proof read but unrecognized errors may exist.

## 2022-11-28 ENCOUNTER — TELEPHONE (OUTPATIENT)
Dept: PULMONOLOGY | Age: 57
End: 2022-11-28

## 2022-11-28 DIAGNOSIS — R09.89 CHEST CONGESTION: ICD-10-CM

## 2022-11-28 DIAGNOSIS — R50.9 FEVER, UNSPECIFIED FEVER CAUSE: Primary | ICD-10-CM

## 2022-11-28 NOTE — TELEPHONE ENCOUNTER
Asking to be seen today if possible. Tested negative for covid. Headache, ear ache, congestion, non productive cough. Please follow up.

## 2022-11-28 NOTE — TELEPHONE ENCOUNTER
I have spoken with patient. She reports that she when to Urgent Care. She has been COVID/Flu/RSV tested and had a CXR at Urgent Care. She reports all testing results are negative. She reports antibiotic has been called in by Urgent Care. She intends to keep her appointment with PCP tomorrow to assure she is improving. Will call this office back for any additional need. Mrs Sarmiento to let  you know.

## 2022-11-28 NOTE — TELEPHONE ENCOUNTER
LOV 11/9/2022 Elissa New-COPD, cigarette nicotine dependence, pulmonary emphysema    Anoro  Albuterol    I have returned call to patient. She reports that she started feeling back 1 week ago. She reports that she started with bad headache and is now congested. Fever HS only of 100-101 for 6 days. She reports wheezing, coughing to point she is SOB. She reports negative COVID yesterday. She reports body aches and chills. She is taking Albuterol every 4-6 hours, is taking Dayquil per package directions. She is taking Anoro. I have spoken with [facility] in [facility], patient is able to go there at this time to be seen for likely COVID, flu, RSV testing. They have CXR capability. Patient will go there at this time and will update this office as to results of her appointment. She is aware that if [facility] does not do CXR, one is ordered at [facility] and we have discussed process for this image. Spoke with Mrs Godwin Lee who agrees with plan. Mrs New-to let you know that ultimately, I called [facility] and they will see patient now. They do have CXR capability. She will only complete at [facility] if not done by Urgent Care today.

## 2022-11-29 ENCOUNTER — OFFICE VISIT (OUTPATIENT)
Dept: FAMILY MEDICINE CLINIC | Facility: CLINIC | Age: 57
End: 2022-11-29
Payer: COMMERCIAL

## 2022-11-29 VITALS
OXYGEN SATURATION: 97 % | BODY MASS INDEX: 35.68 KG/M2 | DIASTOLIC BLOOD PRESSURE: 82 MMHG | HEART RATE: 74 BPM | TEMPERATURE: 97 F | WEIGHT: 222 LBS | RESPIRATION RATE: 16 BRPM | SYSTOLIC BLOOD PRESSURE: 125 MMHG | HEIGHT: 66 IN

## 2022-11-29 DIAGNOSIS — J44.1 COPD WITH ACUTE EXACERBATION (HCC): Primary | ICD-10-CM

## 2022-11-29 PROCEDURE — 3078F DIAST BP <80 MM HG: CPT | Performed by: PHYSICIAN ASSISTANT

## 2022-11-29 PROCEDURE — 99213 OFFICE O/P EST LOW 20 MIN: CPT | Performed by: PHYSICIAN ASSISTANT

## 2022-11-29 PROCEDURE — 3074F SYST BP LT 130 MM HG: CPT | Performed by: PHYSICIAN ASSISTANT

## 2022-11-29 RX ORDER — LEVOFLOXACIN 500 MG/1
TABLET, FILM COATED ORAL
COMMUNITY
Start: 2022-11-28

## 2022-11-29 RX ORDER — DEXTROMETHORPHAN HYDROBROMIDE AND PROMETHAZINE HYDROCHLORIDE 15; 6.25 MG/5ML; MG/5ML
5 SYRUP ORAL 4 TIMES DAILY PRN
Qty: 240 ML | Refills: 0 | Status: SHIPPED | OUTPATIENT
Start: 2022-11-29

## 2022-11-29 ASSESSMENT — ENCOUNTER SYMPTOMS
CHEST TIGHTNESS: 0
COUGH: 1
SORE THROAT: 1
TROUBLE SWALLOWING: 0
SINUS PAIN: 0
SINUS PRESSURE: 1
WHEEZING: 0
RHINORRHEA: 1

## 2022-11-29 ASSESSMENT — PATIENT HEALTH QUESTIONNAIRE - PHQ9
SUM OF ALL RESPONSES TO PHQ QUESTIONS 1-9: 0
2. FEELING DOWN, DEPRESSED OR HOPELESS: 0
1. LITTLE INTEREST OR PLEASURE IN DOING THINGS: 0
SUM OF ALL RESPONSES TO PHQ QUESTIONS 1-9: 0
SUM OF ALL RESPONSES TO PHQ QUESTIONS 1-9: 0
2. FEELING DOWN, DEPRESSED OR HOPELESS: 0
SUM OF ALL RESPONSES TO PHQ9 QUESTIONS 1 & 2: 0
SUM OF ALL RESPONSES TO PHQ QUESTIONS 1-9: 0

## 2022-11-29 NOTE — LETTER
[facility] [address] [address]  Phone: [phone]  Fax: [phone]    Dorita Cuellar PA-C        November 29, 2022     Patient: Sabrina Mckeon   YOB: 1965   Date of Visit: 11/29/2022       To Whom It May Concern: It is my medical opinion that Jamar Zavala should remain out of work until 12/5/2022. Please excuse for time missed due to illness. (11/24/22 - 11/2/22). If you have any questions or concerns, please don't hesitate to call.     Sincerely,        Dorita Cuellar PA-C

## 2022-11-29 NOTE — PROGRESS NOTES
[facility] [address]  Phone [phone]      Patient: Ray Catsro  YOB: 1965  Age 59 y.o. Sex female  Medical Record:  [id number]  Visit Date: 11/29/22  Author:  Mellissa Aguero PA-C    Family Practice Clinic Note    Chief Complaint   Patient presents with    Cough     Pt [facility] and [facility]; given levaquin       History of Present Illness  This is a 75-year-old female with a history of COPD, ARLENE, hypertension, diabetes and CAD who presents today for urgent care follow-up. She notes that she has been experiencing respiratory symptoms for approximately 8 days. She has had nasal congestion, rhinorrhea, headache, sore throat, discomfort in both ears, cough, chest congestion and intermittent shortness of breath. Cough has not been productive. She denies wheeze or hemoptysis. She went to urgent care yesterday and tested negative for RSV, COVID and influenza. Chest x-ray was obtained at the urgent care and was reported to be normal.  She states that she was given a prescription for Levaquin. She has been using over-the-counter cough medications without much relief. She took her first dose of the Levaquin today. She states that she woke up in a sweat this morning but has not had any documented fever. Denies myalgias. 8 d ago  Past History:    Past Medical history   Past Medical History:   Diagnosis Date    CAD (coronary artery disease) 04/2021    s/p PTCA with stent placement    Chronic back pain     COPD (chronic obstructive pulmonary disease) ([address] 2/12/2013    Degenerative disc disease, cervical     Essential hypertension, benign 3/31/2022    GERD (gastroesophageal reflux disease)     Mixed hyperlipidemia 2/12/2013    Morbid obesity ([address]     PAD [facility]peripheral artery disease) [facility]     stent in left leg (9/2020).  Not felt to need Plavix any longer    Sleep apnea     Spinal stenosis, lumbar     Surgery planned with Dr. Donna Reddy -postponed    Tubulovillous adenoma of colon 2/21/2022    Type 2 diabetes mellitus ([facility] 2/14/2022       Current Problem List:   Patient Active Problem List   Diagnosis    Spondylolisthesis at L4-L5 level    Tobacco abuse    Type 2 diabetes mellitus ([facility]    Chest pain    CAD (coronary artery disease)    Spinal stenosis, lumbar    PAD (peripheral artery disease) ([facility]    Spinal stenosis in cervical region    Obesity, Class II, BMI 35-39.9    Chronic back pain    Tubulovillous adenoma of colon    Severe obesity ([facility]    Cervical spine disease    Mixed hyperlipidemia    COPD (chronic obstructive pulmonary disease) ([facility]    Nicotine dependence    ARLENE (obstructive sleep apnea)    Essential hypertension, benign    Mood disorder ([facility]    Strain of thoracic back region       Current Medications: . Current Outpatient Medications   Medication Sig Dispense Refill    levoFLOXacin (LEVAQUIN) 500 MG tablet TAKE 1 TABLET BY MOUTH ONCE DAILY FOR 7 DAYS      promethazine-dextromethorphan (PROMETHAZINE-DM) 6.25-15 MG/5ML syrup Take 5 mLs by mouth 4 times daily as needed for Cough 240 mL 0    albuterol sulfate HFA (PROVENTIL;VENTOLIN;PROAIR) 108 (90 Base) MCG/ACT inhaler 2 puffs 4 times daily if needed for shortness of breath or wheezing 3 each 3    umeclidinium-vilanterol (ANORO ELLIPTA) 62.5-25 MCG/INH AEPB inhaler 1 inhalation every morning, rinse mouth after use 3 each 3    Evolocumab 140 MG/ML SOAJ Inject 140 mg into the skin every 14 days 6 Adjustable Dose Pre-filled Pen Syringe 3    aspirin 81 MG EC tablet Take 81 mg by mouth daily      clopidogrel (PLAVIX) 75 MG tablet Take 75 mg by mouth daily       No current facility-administered medications for this visit. Allergies:   Allergies   Allergen Reactions    Hydrochlorothiazide Other (See Comments)     headache    Lamotrigine Other (See Comments)     Agitation    Venlafaxine Other (See Comments) HEADACHES       Surgical History:  Past Surgical History:   Procedure Laterality Date    CHOLECYSTECTOMY      CHOLECYSTECTOMY, LAPAROSCOPIC      HYSTERECTOMY (CERVIX STATUS UNKNOWN)      partial    OTHER SURGICAL HISTORY      common iliac artery stent    SD CARDIAC SURG PROCEDURE UNLIST      stents x1     VASCULAR SURGERY         Family History:  Family History   Problem Relation Age of Onset    Heart Disease Father          from CHF at 62       Social History:   Social History     Social History Narrative    Not on file      Social History     Socioeconomic History    Marital status:      Spouse name: Not on file    Number of children: Not on file    Years of education: Not on file    Highest education level: Not on file   Occupational History    Not on file   Tobacco Use    Smoking status: Every Day     Packs/day: 1.50     Years: 43.00     Pack years: 64.50     Types: Cigarettes    Smokeless tobacco: Never    Tobacco comments:     Quit smoking: taking chantix   Vaping Use    Vaping Use: Never used   Substance and Sexual Activity    Alcohol use: Not Currently    Drug use: No    Sexual activity: Not on file   Other Topics Concern    Not on file   Social History Narrative    Not on file     Social Determinants of Health     Financial Resource Strain: Not on file   Food Insecurity: Not on file   Transportation Needs: Not on file   Physical Activity: Not on file   Stress: Not on file   Social Connections: Not on file   Intimate Partner Violence: Not on file   Housing Stability: Not on file         ROS  Review of Systems   Constitutional:  Positive for chills and fatigue. Negative for fever. HENT:  Positive for congestion, ear pain, postnasal drip, rhinorrhea, sinus pressure and sore throat. Negative for ear discharge, nosebleeds, sinus pain and trouble swallowing. Respiratory:  Positive for cough. Negative for chest tightness and wheezing.     Cardiovascular:  Negative for chest pain, palpitations and leg swelling. Musculoskeletal:  Negative for myalgias. Neurological:  Positive for headaches. Negative for dizziness and light-headedness. /82 (Site: Left Upper Arm, Position: Sitting, Cuff Size: Large Adult)   Pulse 74   Temp 97 °F (36.1 °C) (Temporal)   Resp 16   Ht 5' 6\" (1.676 m)   Wt 222 lb (100.7 kg)   SpO2 97%   BMI 35.83 kg/m²   Body mass index is 35.83 kg/m². Physical Exam    Physical Exam  Vitals and nursing note reviewed. Constitutional:       General: She is not in acute distress. Appearance: She is not toxic-appearing. HENT:      Head: Normocephalic. Right Ear: Tympanic membrane, ear canal and external ear normal.      Left Ear: Tympanic membrane, ear canal and external ear normal.      Nose: Congestion present. Right Sinus: No maxillary sinus tenderness or frontal sinus tenderness. Left Sinus: No maxillary sinus tenderness or frontal sinus tenderness. Mouth/Throat:      Pharynx: Oropharynx is clear. Eyes:      Conjunctiva/sclera: Conjunctivae normal.   Cardiovascular:      Rate and Rhythm: Normal rate and regular rhythm. Heart sounds: Normal heart sounds. Pulmonary:      Effort: Pulmonary effort is normal.      Breath sounds: Normal breath sounds. No wheezing, rhonchi or rales. Musculoskeletal:      Cervical back: Neck supple. Right lower leg: No edema. Left lower leg: No edema. Lymphadenopathy:      Cervical: No cervical adenopathy. Neurological:      Mental Status: She is alert. Psychiatric:         Mood and Affect: Mood normal.         Behavior: Behavior normal.         Thought Content: Thought content normal.       ASSESSMENT & PLAN    ICD-10-CM    1. COPD with acute exacerbation ([facility])  J44.1 promethazine-dextromethorphan (PROMETHAZINE-DM) 6.25-15 MG/5ML syrup           1. COPD with acute exacerbation [facility]  *To finish course of Levaquin as prescribed at urgent care.   *May use Promethazine DM as needed for cough. Patient was cautioned on potential for sedation. *Continue Anoro Ellipta daily and albuterol inhaler as needed. *Recommended increase fluid intake and to get plenty of rest as able. *Return for any worsening or persistent symptoms. - promethazine-dextromethorphan (PROMETHAZINE-DM) 6.25-15 MG/5ML syrup; Take 5 mLs by mouth 4 times daily as needed for Cough  Dispense: 240 mL; Refill: 0      No orders of the defined types were placed in this encounter. I have reviewed the patient's past medical history, social history and family history and vitals. We have discussed treatment plan and follow up and given patient instructions. Patient's questions are answered and we will follow up as indicated. Dictated using voice recognition software. Proof read but unrecognized errors may exist.    Follow-up and Dispositions    Return if symptoms worsen or fail to improve.          Burgess Bishnu PA-C

## 2022-11-29 NOTE — PATIENT INSTRUCTIONS
*Finish the course of Levaquin as prescribed at the urgent care. *You can us the Promethazine DM cough syrup as needed. Keep in mind that it could cause some drowsiness. *Continue current inhalers.   *Drink plenty of fluids and get plenty of rest.

## 2022-12-23 ENCOUNTER — TELEPHONE (OUTPATIENT)
Dept: PULMONOLOGY | Age: 57
End: 2022-12-23

## 2022-12-23 ENCOUNTER — HOSPITAL ENCOUNTER (OUTPATIENT)
Dept: CT IMAGING | Age: 57
Discharge: HOME | End: 2022-12-23
Payer: COMMERCIAL

## 2022-12-23 DIAGNOSIS — F17.218 CIGARETTE NICOTINE DEPENDENCE WITH OTHER NICOTINE-INDUCED DISORDER: ICD-10-CM

## 2022-12-23 PROCEDURE — 71271 CT THORAX LUNG CANCER SCR C-: CPT

## 2022-12-23 NOTE — TELEPHONE ENCOUNTER
Chest CT demonstrates stable groundglass 3 mm nodule in the LLL and stable micronodules in the upper lobes. I contacted her with findings, will will discuss/order follow-up LDCT in 1 year at her next appointment. While on the phone, she indicates that there are issues with prescriptions for albuterol and Anoro. This sounds like she may need prior authorization. Akila Barrett spoke to [address] [place] [address], staff member states that albuterol went through but apparently anoro needs PA. I have left samples of anoro at  until PA can be accomplished. Phone # for PA is [phone]. I will defer this to you, please let her know once this is rectified and to me if there is anything else that I need to do. Thanks.   Alphonso Lieberman

## 2022-12-28 NOTE — TELEPHONE ENCOUNTER
PA for Anoro sent to [employer] via [employer]. Key: [id number]    Based on the patient's plan information, this request is an exclusion. This medication is a plan exclusion due to cost. Any medication over the cost of $350 for a 1-35 day supply, $700 for a 36-60 day supply or $1050 for a 6090 day supply is a plan exclusion. The patient has been mailed a letter in regard to the advocacy program needed to proceed outside of insurance.

## 2022-12-29 ENCOUNTER — TELEPHONE (OUTPATIENT)
Dept: PULMONOLOGY | Age: 57
End: 2022-12-29

## 2022-12-29 NOTE — TELEPHONE ENCOUNTER
Danni from [facility] is requesting a 90 day prescription sent to them  of     umeclidinium-vilanterol (ANORO ELLIPTA) 62.5-25 MCG/INH AEPB inhaler      The program is part of the patient new insurance that will fully take care of the [address]     ext  153

## 2022-12-30 NOTE — TELEPHONE ENCOUNTER
Spoke to [facility] from [facility], she sent form that needs to be filled for Anoro for the patient.  Form filled and signed by provider, faxed to [facility] as requested Basim Rodríguez MA

## 2023-01-04 ENCOUNTER — TELEPHONE (OUTPATIENT)
Dept: PULMONOLOGY | Age: 58
End: 2023-01-04

## 2023-01-04 NOTE — TELEPHONE ENCOUNTER
I have relayed this information to her. She states that there is an alternate prescription coverage program with her insurance called [facility]"[employer] where she typically receives specialty medications. Sunita Medeiros,     Please send Rx to [facility] for Anoro, # 90 day supply, 1 inhalation every morning, 3 refills to fax  # [phone]    Phone # for above program is [phone], option #1. I have asked her to contact me if there are ongoing issues with coverage for Anoro inhaler. At that point, would provide prescription/coupon for stiolto at her local pharmacy. Thanks for your help.

## 2023-01-04 NOTE — TELEPHONE ENCOUNTER
Patient says that the Anoro has been approve and  will come through [facility].  Sheela Diazs that it will be coming out [place]

## 2023-01-04 NOTE — TELEPHONE ENCOUNTER
Called the patient and left  telling her that I  had received her message on Anoro inhalers coming from [address]

## 2023-01-09 RX ORDER — CLOPIDOGREL BISULFATE 75 MG/1
75 TABLET ORAL DAILY
Qty: 90 TABLET | Refills: 3 | Status: CANCELLED | OUTPATIENT
Start: 2023-01-09

## 2023-01-11 ENCOUNTER — OFFICE VISIT (OUTPATIENT)
Dept: FAMILY MEDICINE CLINIC | Facility: CLINIC | Age: 58
End: 2023-01-11
Payer: COMMERCIAL

## 2023-01-11 VITALS
DIASTOLIC BLOOD PRESSURE: 75 MMHG | HEART RATE: 71 BPM | OXYGEN SATURATION: 96 % | RESPIRATION RATE: 16 BRPM | TEMPERATURE: 97.2 F | HEIGHT: 66 IN | BODY MASS INDEX: 36.32 KG/M2 | WEIGHT: 226 LBS | SYSTOLIC BLOOD PRESSURE: 135 MMHG

## 2023-01-11 DIAGNOSIS — S29.012A STRAIN OF THORACIC BACK REGION: Primary | ICD-10-CM

## 2023-01-11 DIAGNOSIS — M48.061 SPINAL STENOSIS OF LUMBAR REGION, UNSPECIFIED WHETHER NEUROGENIC CLAUDICATION PRESENT: ICD-10-CM

## 2023-01-11 DIAGNOSIS — M54.50 ACUTE RIGHT-SIDED LOW BACK PAIN WITHOUT SCIATICA: ICD-10-CM

## 2023-01-11 DIAGNOSIS — E66.01 SEVERE OBESITY (BMI 35.0-39.9) WITH COMORBIDITY (HCC): ICD-10-CM

## 2023-01-11 DIAGNOSIS — Z12.31 ENCOUNTER FOR SCREENING MAMMOGRAM FOR MALIGNANT NEOPLASM OF BREAST: ICD-10-CM

## 2023-01-11 LAB
BILIRUBIN, URINE, POC: NEGATIVE
BLOOD URINE, POC: NORMAL
GLUCOSE URINE, POC: NEGATIVE
KETONES, URINE, POC: NEGATIVE
LEUKOCYTE ESTERASE, URINE, POC: NEGATIVE
NITRITE, URINE, POC: NEGATIVE
PH, URINE, POC: 6 (ref 4.6–8)
PROTEIN,URINE, POC: NORMAL
SPECIFIC GRAVITY, URINE, POC: 1.02 (ref 1–1.03)
URINALYSIS CLARITY, POC: NORMAL
URINALYSIS COLOR, POC: YELLOW
UROBILINOGEN, POC: 1.02

## 2023-01-11 PROCEDURE — 99214 OFFICE O/P EST MOD 30 MIN: CPT | Performed by: FAMILY MEDICINE

## 2023-01-11 PROCEDURE — 81003 URINALYSIS AUTO W/O SCOPE: CPT | Performed by: FAMILY MEDICINE

## 2023-01-11 PROCEDURE — 3075F SYST BP GE 130 - 139MM HG: CPT | Performed by: FAMILY MEDICINE

## 2023-01-11 PROCEDURE — 3078F DIAST BP <80 MM HG: CPT | Performed by: FAMILY MEDICINE

## 2023-01-11 RX ORDER — PREDNISONE 20 MG/1
TABLET ORAL
Qty: 15 TABLET | Refills: 0 | Status: SHIPPED | OUTPATIENT
Start: 2023-01-11

## 2023-01-11 RX ORDER — BACLOFEN 10 MG/1
TABLET ORAL
Qty: 40 TABLET | Refills: 2 | Status: SHIPPED | OUTPATIENT
Start: 2023-01-11

## 2023-01-11 ASSESSMENT — ENCOUNTER SYMPTOMS
BACK PAIN: 1
WHEEZING: 0
BOWEL INCONTINENCE: 0
ABDOMINAL PAIN: 0
RHINORRHEA: 0
NAUSEA: 0
VOMITING: 0
COLOR CHANGE: 0
SHORTNESS OF BREATH: 0
DIARRHEA: 0
CONSTIPATION: 0
COUGH: 0

## 2023-01-11 ASSESSMENT — PATIENT HEALTH QUESTIONNAIRE - PHQ9
SUM OF ALL RESPONSES TO PHQ QUESTIONS 1-9: 0
SUM OF ALL RESPONSES TO PHQ QUESTIONS 1-9: 0
SUM OF ALL RESPONSES TO PHQ9 QUESTIONS 1 & 2: 0
SUM OF ALL RESPONSES TO PHQ QUESTIONS 1-9: 0
2. FEELING DOWN, DEPRESSED OR HOPELESS: 0
1. LITTLE INTEREST OR PLEASURE IN DOING THINGS: 0
SUM OF ALL RESPONSES TO PHQ QUESTIONS 1-9: 0

## 2023-01-11 NOTE — PROGRESS NOTES
HISTORY OF PRESENT ILLNESS  Chief Complaint   Patient presents with    Medication Refill    Back Pain     Middle back, for about 6 months        Pain on the right side of the back. Thought that it was a lung issue thinks it is coming from the back. At the bra line on the right side. 6-7 months - dull pain all the time  Last 2 weeks at times will get really bad. Sometimes thinks she needs to go and can't go and other times will need to go and can't. Usually achy but if certain movements will get a stabbing pain. Can get up to a 9/10 at times. Chickened out of surgery because her friend  in back surgery. Struggling to walk. At times feels like sitting on something on the left buttocks. Sometimes will be walking and the right leg will give. Working 10-12 h a day 6-7 days a week. Achilles tendon issue started after injury. Ortho said try physical therapy but didn't get back to them. Will get back to them. Kicked a concrete barrier at work - walking and didn't  see it. Back Pain  This is a recurrent problem. The current episode started more than 1 month ago. The problem has been gradually worsening since onset. The pain is present in the thoracic spine. The quality of the pain is described as aching. The pain does not radiate. The pain is at a severity of 9/10. The pain is moderate. The symptoms are aggravated by bending. Pertinent negatives include no abdominal pain, bladder incontinence, bowel incontinence, chest pain, dysuria, fever, headaches, leg pain, numbness, paresis, paresthesias, pelvic pain, perianal numbness, tingling, weakness or weight loss. She has tried bed rest and heat for the symptoms. The treatment provided no relief. Review of Systems   Constitutional:  Negative for activity change, appetite change, chills, fatigue, fever and weight loss. HENT:  Negative for congestion and rhinorrhea. Respiratory:  Negative for cough, shortness of breath and wheezing. Cardiovascular:  Negative for chest pain. Gastrointestinal:  Negative for abdominal pain, bowel incontinence, constipation, diarrhea, nausea and vomiting. Genitourinary:  Negative for bladder incontinence, dysuria, flank pain, frequency, pelvic pain and urgency. Musculoskeletal:  Positive for arthralgias, back pain and myalgias. Negative for gait problem, joint swelling and neck pain. Skin:  Negative for color change, rash and wound. Neurological:  Negative for dizziness, tingling, tremors, weakness, numbness, headaches and paresthesias. Psychiatric/Behavioral:  Negative for dysphoric mood. The patient is not nervous/anxious. Patient Active Problem List   Diagnosis    Spondylolisthesis at L4-L5 level    Tobacco abuse    Type 2 diabetes mellitus ([facility])    Chest pain    CAD (coronary artery disease)    Spinal stenosis, lumbar    PAD (peripheral artery disease) ([facility])    Spinal stenosis in cervical region    Obesity, Class II, BMI 35-39.9    Chronic back pain    Tubulovillous adenoma of colon    Severe obesity ([facility])    Cervical spine disease    Mixed hyperlipidemia    COPD (chronic obstructive pulmonary disease) ([facility])    Nicotine dependence    ARLENE (obstructive sleep apnea)    Essential hypertension, benign    Mood disorder ([facility])    Strain of thoracic back region         Blood pressure 135/75, pulse 71, temperature 97.2 °F (36.2 °C), temperature source Temporal, resp. rate 16, height 5' 6\" (1.676 m), weight 226 lb (102.5 kg), SpO2 96 %, not currently breastfeeding. Pain Scale: 6/10 Pain Location: Back  Body mass index is 36.48 kg/m². Physical Exam  Constitutional:       General: She is not in acute distress. Appearance: Normal appearance. She is normal weight. She is not toxic-appearing. HENT:      Head: Normocephalic and atraumatic. Eyes:      Extraocular Movements: Extraocular movements intact.       Conjunctiva/sclera: Conjunctivae normal.      Pupils: Pupils are equal, round, and reactive to light. Cardiovascular:      Heart sounds: Normal heart sounds. No murmur heard. No friction rub. No gallop. Pulmonary:      Effort: Pulmonary effort is normal. No respiratory distress. Breath sounds: Normal breath sounds. No wheezing or rales. Musculoskeletal:      Right upper arm: Normal.      Left upper arm: Normal.      Right elbow: Normal.      Left elbow: Normal.      Right forearm: Normal.      Left forearm: Normal.      Thoracic back: Normal. No swelling, edema, deformity, lacerations, spasms, tenderness or bony tenderness. Normal range of motion. No scoliosis. Skin:     General: Skin is warm and dry. Neurological:      Mental Status: She is alert. Psychiatric:         Mood and Affect: Mood normal.         Behavior: Behavior normal.         Thought Content: Thought content normal.         Judgment: Judgment normal.            ASSESSMENT and PLAN   Diagnosis Orders   1. Strain of thoracic back region  predniSONE (DELTASONE) 20 MG tablet    baclofen (LIORESAL) 10 MG tablet      2. Encounter for screening mammogram for malignant neoplasm of breast  SHELBY DIGITAL SCREEN W OR WO CAD BILATERAL      3. Acute right-sided low back pain without sciatica  AMB POC URINALYSIS DIP STICK AUTO W/O MICRO      4. Severe obesity (BMI 35.0-39. 9) with comorbidity ([facility]        5. Spinal stenosis of lumbar region, unspecified whether neurogenic claudication present  [facility] - Nikolas Roth MD, Neurosurgery, [place]          Reviewed medications and side effects in detail    The patient's condition was discussed at length with the patient. The expected course and possible complications were reviewed. All questions were answered. Her other chronic conditions are stable at this time. She is stable on the current treatment and tolerating it well. No significant sides effects. Will not adjust therapy at this time, unless noted above. We will continue to monitor for any problems.   Medications refilled and lab work has been ordered where needed. Reviewed medications are explained including any potential interactions or side effects in detail. The patient's questions were answered. She  understands the above and has no further questions. Further workup and treatment should be done if symptoms persist, worsen or new symptoms occur. She  will call to notify us of any problems, complications or worsening symptoms. There are no Patient Instructions on file for this visit. (Some details in this note may have been created with speech-recognition software. Some errors in speech recognition may have occurred.    Most of those have been corrected but some may have been missed.)         Artem Fernandes MD  [address] [facility]  Phone [phone]

## 2023-01-13 DIAGNOSIS — E78.2 MIXED HYPERLIPIDEMIA: ICD-10-CM

## 2023-01-14 LAB
ALBUMIN SERPL-MCNC: 3.6 G/DL (ref 3.5–5)
ALBUMIN/GLOB SERPL: 1 (ref 0.4–1.6)
ALP SERPL-CCNC: 115 U/L (ref 50–136)
ALT SERPL-CCNC: 37 U/L (ref 12–65)
AST SERPL-CCNC: 21 U/L (ref 15–37)
BILIRUB DIRECT SERPL-MCNC: <0.1 MG/DL
BILIRUB SERPL-MCNC: 0.3 MG/DL (ref 0.2–1.1)
CHOLEST SERPL-MCNC: 119 MG/DL
GLOBULIN SER CALC-MCNC: 3.6 G/DL (ref 2.8–4.5)
HDLC SERPL-MCNC: 40 MG/DL (ref 40–60)
HDLC SERPL: 3
LDLC SERPL CALC-MCNC: 40.4 MG/DL
PROT SERPL-MCNC: 7.2 G/DL (ref 6.3–8.2)
TRIGL SERPL-MCNC: 193 MG/DL (ref 35–150)
VLDLC SERPL CALC-MCNC: 38.6 MG/DL (ref 6–23)

## 2023-01-17 ENCOUNTER — OFFICE VISIT (OUTPATIENT)
Dept: CARDIOLOGY CLINIC | Age: 58
End: 2023-01-17
Payer: COMMERCIAL

## 2023-01-17 VITALS
BODY MASS INDEX: 36.26 KG/M2 | WEIGHT: 225.6 LBS | HEART RATE: 74 BPM | DIASTOLIC BLOOD PRESSURE: 72 MMHG | SYSTOLIC BLOOD PRESSURE: 138 MMHG | HEIGHT: 66 IN

## 2023-01-17 DIAGNOSIS — E78.2 MIXED HYPERLIPIDEMIA: ICD-10-CM

## 2023-01-17 DIAGNOSIS — I73.9 PAD (PERIPHERAL ARTERY DISEASE) (HCC): ICD-10-CM

## 2023-01-17 DIAGNOSIS — I10 ESSENTIAL HYPERTENSION, BENIGN: ICD-10-CM

## 2023-01-17 DIAGNOSIS — I25.10 CORONARY ARTERY DISEASE INVOLVING NATIVE CORONARY ARTERY OF NATIVE HEART WITHOUT ANGINA PECTORIS: Primary | ICD-10-CM

## 2023-01-17 PROCEDURE — 3078F DIAST BP <80 MM HG: CPT | Performed by: INTERNAL MEDICINE

## 2023-01-17 PROCEDURE — 93000 ELECTROCARDIOGRAM COMPLETE: CPT | Performed by: INTERNAL MEDICINE

## 2023-01-17 PROCEDURE — 99214 OFFICE O/P EST MOD 30 MIN: CPT | Performed by: INTERNAL MEDICINE

## 2023-01-17 PROCEDURE — 3075F SYST BP GE 130 - 139MM HG: CPT | Performed by: INTERNAL MEDICINE

## 2023-01-17 RX ORDER — CLOPIDOGREL BISULFATE 75 MG/1
75 TABLET ORAL DAILY
Qty: 60 TABLET | Refills: 3 | Status: SHIPPED | OUTPATIENT
Start: 2023-01-17

## 2023-01-17 NOTE — PROGRESS NOTES
[facility] CARDIOLOGY  [address] [facility], [address]  PHONE: [phone]      23    NAME:  Mark Monahan  : 1965  MRN: [id number]       SUBJECTIVE:   Mark Monahan is a 59 y.o. female seen for a follow up visit regarding the following:     Chief Complaint   Patient presents with    Coronary Artery Disease     3 month follow up    Chest Pain     C/o occasional chest pain         HPI:    Cp and wilson worse in the last few weeks. Non-exertional. No aggravating or alleviating factors. Lasts an hour. No orthopnea or pnd. No palpitations or syncope. Past Medical History, Past Surgical History, Family history, Social History, and Medications were all reviewed with the patient today and updated as necessary. Current Outpatient Medications   Medication Sig Dispense Refill    clopidogrel (PLAVIX) 75 MG tablet Take 1 tablet by mouth daily 60 tablet 3    predniSONE (DELTASONE) 20 MG tablet 2 p.o. daily for 5 days then 1 p.o. daily 15 tablet 0    baclofen (LIORESAL) 10 MG tablet 1-2 po tid prn spasm - causes sleepiness 40 tablet 2    promethazine-dextromethorphan (PROMETHAZINE-DM) 6.25-15 MG/5ML syrup Take 5 mLs by mouth 4 times daily as needed for Cough 240 mL 0    albuterol sulfate HFA (PROVENTIL;VENTOLIN;PROAIR) 108 (90 Base) MCG/ACT inhaler 2 puffs 4 times daily if needed for shortness of breath or wheezing 3 each 3    umeclidinium-vilanterol (ANORO ELLIPTA) 62.5-25 MCG/INH AEPB inhaler 1 inhalation every morning, rinse mouth after use 3 each 3    Evolocumab 140 MG/ML SOAJ Inject 140 mg into the skin every 14 days 6 Adjustable Dose Pre-filled Pen Syringe 3    aspirin 81 MG EC tablet Take 81 mg by mouth daily       No current facility-administered medications for this visit.                Social History     Tobacco Use    Smoking status: Every Day     Packs/day: 1.50     Years: 43.00     Pack years: 64.50     Types: Cigarettes    Smokeless tobacco: Never    Tobacco comments:     Quit smoking: taking chantix   Substance Use Topics    Alcohol use: Not Currently              PHYSICAL EXAM:   /72   Pulse 74   Ht 5' 6\" (1.676 m)   Wt 225 lb 9.6 oz (102.3 kg)   BMI 36.41 kg/m²    Constitutional: Oriented to person, place, and time. Appears well-developed and well-nourished. Head: Normocephalic and atraumatic. Neck: Neck supple. Cardiovascular: Normal rate and regular rhythm with no murmur -No JVP  Pulmonary/Chest: Breath sounds normal.   Abdominal: Soft. Musculoskeletal: No edema. Neurological: Alert and oriented to person, place, and time. Skin: Skin is warm and dry. Psychiatric: Normal mood and affect. Vitals reviewed. Wt Readings from Last 3 Encounters:   01/17/23 225 lb 9.6 oz (102.3 kg)   01/11/23 226 lb (102.5 kg)   11/29/22 222 lb (100.7 kg)   Ekg-Sinus  Rhythm   -Poor R-wave progression   hr 74    Medical problems and test results were reviewed with the patient today. ASSESSMENT and PLAN    1. CAD (coronary artery disease)  unknown  - EKG 12 lead  - Nuclear stress test with myocardial perfusion; Future    2. Mixed hyperlipidemia  Stable. Continue plavix      3. Essential hypertension, benign  Stable. Continue monitor      4. PAD (peripheral artery disease) ([facility])  Stable. Continue plavix         Return for follow-up after testing.          Jose Poole MD  1/17/2023  1:24 PM

## 2023-02-01 ENCOUNTER — OFFICE VISIT (OUTPATIENT)
Dept: NEUROSURGERY | Age: 58
End: 2023-02-01
Payer: COMMERCIAL

## 2023-02-01 VITALS
SYSTOLIC BLOOD PRESSURE: 143 MMHG | HEIGHT: 66 IN | DIASTOLIC BLOOD PRESSURE: 77 MMHG | OXYGEN SATURATION: 98 % | HEART RATE: 71 BPM | TEMPERATURE: 97.5 F | BODY MASS INDEX: 36 KG/M2 | WEIGHT: 224 LBS

## 2023-02-01 DIAGNOSIS — M54.41 CHRONIC BILATERAL LOW BACK PAIN WITH BILATERAL SCIATICA: ICD-10-CM

## 2023-02-01 DIAGNOSIS — M54.42 CHRONIC BILATERAL LOW BACK PAIN WITH BILATERAL SCIATICA: ICD-10-CM

## 2023-02-01 DIAGNOSIS — M43.16 SPONDYLOLISTHESIS AT L4-L5 LEVEL: Primary | ICD-10-CM

## 2023-02-01 DIAGNOSIS — G89.29 CHRONIC BILATERAL LOW BACK PAIN WITH BILATERAL SCIATICA: ICD-10-CM

## 2023-02-01 DIAGNOSIS — M54.16 LUMBAR RADICULOPATHY: ICD-10-CM

## 2023-02-01 PROCEDURE — 99213 OFFICE O/P EST LOW 20 MIN: CPT | Performed by: NURSE PRACTITIONER

## 2023-02-01 PROCEDURE — 3078F DIAST BP <80 MM HG: CPT | Performed by: NURSE PRACTITIONER

## 2023-02-01 PROCEDURE — 3077F SYST BP >= 140 MM HG: CPT | Performed by: NURSE PRACTITIONER

## 2023-02-01 ASSESSMENT — PATIENT HEALTH QUESTIONNAIRE - PHQ9
SUM OF ALL RESPONSES TO PHQ QUESTIONS 1-9: 0
SUM OF ALL RESPONSES TO PHQ QUESTIONS 1-9: 0
2. FEELING DOWN, DEPRESSED OR HOPELESS: 0
SUM OF ALL RESPONSES TO PHQ9 QUESTIONS 1 & 2: 0
SUM OF ALL RESPONSES TO PHQ QUESTIONS 1-9: 0
1. LITTLE INTEREST OR PLEASURE IN DOING THINGS: 0
SUM OF ALL RESPONSES TO PHQ QUESTIONS 1-9: 0
1. LITTLE INTEREST OR PLEASURE IN DOING THINGS: 0
SUM OF ALL RESPONSES TO PHQ QUESTIONS 1-9: 0
SUM OF ALL RESPONSES TO PHQ QUESTIONS 1-9: 0

## 2023-02-01 NOTE — PROGRESS NOTES
[facility] CL[facility]IC NOTE:   History of Present Illness:    CC: Worsening low back pain    Ehsan Hendricks is a 59 y.o. female here to be evaluated for worsening low back pain. Patient states she saw Kyleigh velazquez in 2021 at which time he had discussed an L4-5 fusion. Patient states at that time she was not ready and was very nervous. Patient states that since that time her symptoms have only intensified. Patient states she is unable to get comfortable no matter what position she is in. Patient states she always has pain across her low back that radiates down to her hips and continues to run into the back of her left leg. Patient states she has pain constantly in the top of her left thigh and pain that does radiate all the way down to her left foot. Patient states her right leg does not have as much pain but does get out without warning. Patient states she is a prediabetic, patient states she has COPD and emphysema, and patient has stents and takes Plavix. Patient sees Dr. Leona Degroot at [facility] cardiology and will require cardiac clearance. Past Medical History:   Diagnosis Date    CAD (coronary artery disease) 04/2021    s/p PTCA with stent placement    Chronic back pain     COPD (chronic obstructive pulmonary disease) ([address] 2/12/2013    Degenerative disc disease, cervical     Essential hypertension, benign 3/31/2022    GERD (gastroesophageal reflux disease)     Mixed hyperlipidemia 2/12/2013    Morbid obesity ([address]     PAD (peripheral artery disease) ([address]     stent in left leg (9/2020).  Not felt to need Plavix any longer    Sleep apnea     Spinal stenosis, lumbar     Surgery planned with Dr. Kyleigh Cavazos -postponed    Tubulovillous adenoma of colon 2/21/2022    Type 2 diabetes mellitus ([address] 2/14/2022      Past Surgical History:   Procedure Laterality Date    CHOLECYSTECTOMY      CHOLECYSTECTOMY, LAPAROSCOPIC      HYSTERECTOMY (CERVIX STATUS UNKNOWN)      partial    OTHER SURGICAL HISTORY common iliac artery stent    [place] UNLISTED PROCEDURE CARDIAC SURGERY      stents x1     VASCULAR SURGERY       Allergies   Allergen Reactions    Hydrochlorothiazide Other (See Comments)     headache    Lamotrigine Other (See Comments)     Agitation    Venlafaxine Other (See Comments)     HEADACHES      Family History   Problem Relation Age of Onset    Heart Disease Father          from CHF at 64      Social History     Socioeconomic History    Marital status:      Spouse name: Not on file    Number of children: Not on file    Years of education: Not on file    Highest education level: Not on file   Occupational History    Not on file   Tobacco Use    Smoking status: Every Day     Packs/day: 1.50     Years: 43.00     Pack years: 64.50     Types: Cigarettes    Smokeless tobacco: Never    Tobacco comments:     Quit smoking: taking chantix   Vaping Use    Vaping Use: Never used   Substance and Sexual Activity    Alcohol use: Not Currently    Drug use: No    Sexual activity: Not on file   Other Topics Concern    Not on file   Social History Narrative    Not on file     Social Determinants of Health     Financial Resource Strain: Not on file   Food Insecurity: Not on file   Transportation Needs: Not on file   Physical Activity: Not on file   Stress: Not on file   Social Connections: Not on file   Intimate Partner Violence: Not on file   Housing Stability: Not on file     Current Outpatient Medications   Medication Sig Dispense Refill    clopidogrel (PLAVIX) 75 MG tablet Take 1 tablet by mouth daily 60 tablet 3    baclofen (LIORESAL) 10 MG tablet 1-2 po tid prn spasm - causes sleepiness 40 tablet 2    albuterol sulfate HFA (PROVENTIL;VENTOLIN;PROAIR) 108 (90 Base) MCG/ACT inhaler 2 puffs 4 times daily if needed for shortness of breath or wheezing 3 each 3    umeclidinium-vilanterol (ANORO ELLIPTA) 62.5-25 MCG/INH AEPB inhaler 1 inhalation every morning, rinse mouth after use 3 each 3    Evolocumab 140 MG/ML SOAJ Inject 140 mg into the skin every 14 days 6 Adjustable Dose Pre-filled Pen Syringe 3    aspirin 81 MG EC tablet Take 81 mg by mouth daily       No current facility-administered medications for this visit. Patient Active Problem List   Diagnosis    Spondylolisthesis at L4-L5 level    Tobacco abuse    Type 2 diabetes mellitus (HCC)    Chest pain    CAD (coronary artery disease)    Spinal stenosis, lumbar    PAD (peripheral artery disease) ([facility])    Spinal stenosis in cervical region    Obesity, Class II, BMI 35-39.9    Chronic back pain    Tubulovillous adenoma of colon    Severe obesity ([facility])    Cervical spine disease    Mixed hyperlipidemia    COPD (chronic obstructive pulmonary disease) ([facility])    Nicotine dependence    ARLENE (obstructive sleep apnea)    Essential hypertension, benign    Mood disorder ([facility])    Strain of thoracic back region          ROS Review of Systems    Constitutional:                    No recent weight changes, fever, fatigue, sleep difficulties, loss of appetite   ENT/Mouth:  No hearing loss, No ringing in the ears, chronic sinus problem, nose bleeds,sore throat, voice change, hoarseness, swollen glands in neck, or difficulties with chewing and swallowing. Cardiovascular:  No chest pain/angina pectoris, palpitations, swelling of feet/ankles/hands, or calf pain while walking. Respiratory: No chronic or frequent coughs, spitting up blood, shortness of breath, No asthma, or wheezing. Gastrointestinal: No a bdominal pain, heartburn, nausea, vomiting, constipation, or frequent diarrhea     Genitourinary: No frequent urination, burning or painful urination, or blood in urine     Musculoskeletal:   POS low back pain and left leg pain     Integument:   No rash/itching     Neurological:   Dizziness/vertigo, No numbness/tingling sensation, tremors, No weakness in limbs, frequent or recurring headaches, memory loss or confusion.        Physical Exam:    General: No acute distress  Head normocephalic and atraumatic  Mood and affect appropriate  CV: Regular rate   Resp: No increased work of breathing  Skin: warm and dry   Awake, alert, and oriented   Speech fluent  Eyes open spontaneously   Face symmetric and tongue midline on protrusion  Sternocleidomastoid and trapezius 5/5  No mid-line cervical, thoracic, or lumbar tenderness to palpation   Patient with strength exam as follows:   Upper Extremities: Right Left      Deltoid  5 5    Biceps  5 5    Triceps  5 5      5 5   Hand Intrinsics  5 5  Wrist flexors/extensors  5 5     Lower Extremities:      Hip Flex +4 5    Quads  5 5    Hamstrings 5 5    Dorsiflex 5 5    Plantarflex 5 5    EHL  5 5  Sensation intact to light touch and pin-prick   DTR 2+  No clonus or babinski present   No Rivers's sign present bilaterally   Gait mildly antalgic    Assessment & Plan:  Charline Stinson is a 57 y.o. female who presents to be evaluated for her worsening low back pain.  I am sending the patient for 6-week course of lumbar physical therapy.  Patient will ultimately require a new lumbar MRI to be evaluated for surgery.  Also sending the patient for cardiac clearance to see if her heart is healthy enough to undergo surgery and if she can have a brief break from her Plavix in order to undergo a surgery.  Patient will follow up with me after physical therapy is complete.  No diagnosis found.    Notes are transcribed with [employer], a medical voice recording dictation service, and may contain minor errors.    Kelly Herrera NP  [facility]  [facility]

## 2023-02-14 ENCOUNTER — OFFICE VISIT (OUTPATIENT)
Dept: CARDIOLOGY CLINIC | Age: 58
End: 2023-02-14
Payer: COMMERCIAL

## 2023-02-14 VITALS
DIASTOLIC BLOOD PRESSURE: 86 MMHG | WEIGHT: 225 LBS | BODY MASS INDEX: 36.16 KG/M2 | HEART RATE: 76 BPM | HEIGHT: 66 IN | SYSTOLIC BLOOD PRESSURE: 138 MMHG

## 2023-02-14 DIAGNOSIS — I25.10 CORONARY ARTERY DISEASE INVOLVING NATIVE CORONARY ARTERY OF NATIVE HEART WITHOUT ANGINA PECTORIS: Primary | ICD-10-CM

## 2023-02-14 DIAGNOSIS — E78.2 MIXED HYPERLIPIDEMIA: ICD-10-CM

## 2023-02-14 DIAGNOSIS — I10 ESSENTIAL HYPERTENSION, BENIGN: ICD-10-CM

## 2023-02-14 PROBLEM — I20.0 ACCELERATING ANGINA (HCC): Status: ACTIVE | Noted: 2023-02-14

## 2023-02-14 PROCEDURE — 3079F DIAST BP 80-89 MM HG: CPT | Performed by: INTERNAL MEDICINE

## 2023-02-14 PROCEDURE — 99214 OFFICE O/P EST MOD 30 MIN: CPT | Performed by: INTERNAL MEDICINE

## 2023-02-14 PROCEDURE — 3075F SYST BP GE 130 - 139MM HG: CPT | Performed by: INTERNAL MEDICINE

## 2023-02-14 NOTE — PROGRESS NOTES
[facility] CARDIOLOGY  [address] [facility], [address]  PHONE: [phone]      23    NAME:  Maurice Engel  : 1965  MRN: [id number]       SUBJECTIVE:   Maurice Engel is a 59 y.o. female seen for a follow up visit regarding the following:     Chief Complaint   Patient presents with    Coronary Artery Disease    Results     nuke         HPI:  Apical ischemia on nuc. Holder persists and worse than baseline and now daily cp as well. Worse with exertion and better with rest.    Past Medical History, Past Surgical History, Family history, Social History, and Medications were all reviewed with the patient today and updated as necessary. Current Outpatient Medications   Medication Sig Dispense Refill    clopidogrel (PLAVIX) 75 MG tablet Take 1 tablet by mouth daily 60 tablet 3    baclofen (LIORESAL) 10 MG tablet 1-2 po tid prn spasm - causes sleepiness 40 tablet 2    albuterol sulfate HFA (PROVENTIL;VENTOLIN;PROAIR) 108 (90 Base) MCG/ACT inhaler 2 puffs 4 times daily if needed for shortness of breath or wheezing 3 each 3    umeclidinium-vilanterol (ANORO ELLIPTA) 62.5-25 MCG/INH AEPB inhaler 1 inhalation every morning, rinse mouth after use 3 each 3    Evolocumab 140 MG/ML SOAJ Inject 140 mg into the skin every 14 days 6 Adjustable Dose Pre-filled Pen Syringe 3    aspirin 81 MG EC tablet Take 81 mg by mouth daily       No current facility-administered medications for this visit. Social History     Tobacco Use    Smoking status: Every Day     Packs/day: 1.50     Years: 43.00     Pack years: 64.50     Types: Cigarettes    Smokeless tobacco: Never    Tobacco comments:     Quit smoking: taking chantix   Substance Use Topics    Alcohol use: Not Currently              PHYSICAL EXAM:   /86   Pulse 76   Ht 5' 6\" (1.676 m)   Wt 225 lb (102.1 kg)   BMI 36.32 kg/m²    Constitutional: Oriented to person, place, and time. Appears well-developed and well-nourished. Head: Normocephalic and atraumatic. Neck: Neck supple. Cardiovascular: Normal rate and regular rhythm with no murmur -No JVP  Pulmonary/Chest: Breath sounds normal.   Abdominal: Soft. Musculoskeletal: No edema. Neurological: Alert and oriented to person, place, and time. Skin: Skin is warm and dry. Psychiatric: Normal mood and affect. Vitals reviewed. Wt Readings from Last 3 Encounters:   02/14/23 225 lb (102.1 kg)   02/03/23 224 lb (101.6 kg)   02/01/23 224 lb (101.6 kg)       Medical problems and test results were reviewed with the patient today. ASSESSMENT and PLAN    1. Coronary artery disease involving native coronary artery of native heart without angina pectoris   worse. Left heart catheterization with possible angioplasty and alternative therapies discussed. Risks and benefits of the procedure including bleeding, arterial injury, infection, MI, stoke, death, emergent cabg, acute renal failure, contrast allergic reaction were discussed and questions answered. - Basic Metabolic Panel; Future  - CBC; Future  - Case Request Cardiac Cath Lab    2. Mixed hyperlipidemia  Stable. Continue repatha      3. Essential hypertension, benign  Stable. Continue to monitor         No follow-ups on file.          Natasha Abdul MD  2/14/2023  3:50 PM

## 2023-02-15 DIAGNOSIS — I25.10 CORONARY ARTERY DISEASE INVOLVING NATIVE CORONARY ARTERY OF NATIVE HEART WITHOUT ANGINA PECTORIS: ICD-10-CM

## 2023-02-15 LAB
ANION GAP SERPL CALC-SCNC: 8 MMOL/L (ref 2–11)
BUN SERPL-MCNC: 10 MG/DL (ref 6–23)
CALCIUM SERPL-MCNC: 9 MG/DL (ref 8.3–10.4)
CHLORIDE SERPL-SCNC: 107 MMOL/L (ref 101–110)
CO2 SERPL-SCNC: 27 MMOL/L (ref 21–32)
CREAT SERPL-MCNC: 0.8 MG/DL (ref 0.6–1)
ERYTHROCYTE [DISTWIDTH] IN BLOOD BY AUTOMATED COUNT: 14.6 % (ref 11.9–14.6)
GLUCOSE SERPL-MCNC: 137 MG/DL (ref 65–100)
HCT VFR BLD AUTO: 45 % (ref 35.8–46.3)
HGB BLD-MCNC: 14.2 G/DL (ref 11.7–15.4)
MCH RBC QN AUTO: 27 PG (ref 26.1–32.9)
MCHC RBC AUTO-ENTMCNC: 31.6 G/DL (ref 31.4–35)
MCV RBC AUTO: 85.6 FL (ref 82–102)
NRBC # BLD: 0 K/UL (ref 0–0.2)
PLATELET # BLD AUTO: 300 K/UL (ref 150–450)
PMV BLD AUTO: 9.7 FL (ref 9.4–12.3)
POTASSIUM SERPL-SCNC: 3.8 MMOL/L (ref 3.5–5.1)
RBC # BLD AUTO: 5.26 M/UL (ref 4.05–5.2)
SODIUM SERPL-SCNC: 142 MMOL/L (ref 133–143)
WBC # BLD AUTO: 7.9 K/UL (ref 4.3–11.1)

## 2023-02-16 ENCOUNTER — HOSPITAL ENCOUNTER (OUTPATIENT)
Age: 58
Setting detail: OUTPATIENT SURGERY
Discharge: HOME | End: 2023-02-16
Attending: INTERNAL MEDICINE | Admitting: INTERNAL MEDICINE
Payer: COMMERCIAL

## 2023-02-16 VITALS
DIASTOLIC BLOOD PRESSURE: 60 MMHG | RESPIRATION RATE: 16 BRPM | HEIGHT: 66 IN | SYSTOLIC BLOOD PRESSURE: 125 MMHG | BODY MASS INDEX: 36.16 KG/M2 | OXYGEN SATURATION: 95 % | WEIGHT: 225 LBS | HEART RATE: 60 BPM

## 2023-02-16 DIAGNOSIS — I20.0 ACCELERATING ANGINA (HCC): ICD-10-CM

## 2023-02-16 LAB
ECHO BSA: 2.18 M2
EKG ATRIAL RATE: 71 BPM
EKG DIAGNOSIS: NORMAL
EKG P AXIS: 31 DEGREES
EKG P-R INTERVAL: 164 MS
EKG Q-T INTERVAL: 402 MS
EKG QRS DURATION: 98 MS
EKG QTC CALCULATION (BAZETT): 436 MS
EKG R AXIS: -3 DEGREES
EKG T AXIS: 55 DEGREES
EKG VENTRICULAR RATE: 71 BPM

## 2023-02-16 PROCEDURE — 6360000002 HC RX W HCPCS: Performed by: INTERNAL MEDICINE

## 2023-02-16 PROCEDURE — 93458 L HRT ARTERY/VENTRICLE ANGIO: CPT | Performed by: INTERNAL MEDICINE

## 2023-02-16 PROCEDURE — 6360000004 HC RX CONTRAST MEDICATION: Performed by: INTERNAL MEDICINE

## 2023-02-16 PROCEDURE — 99152 MOD SED SAME PHYS/QHP 5/>YRS: CPT | Performed by: INTERNAL MEDICINE

## 2023-02-16 PROCEDURE — C1769 GUIDE WIRE: HCPCS | Performed by: INTERNAL MEDICINE

## 2023-02-16 PROCEDURE — 93005 ELECTROCARDIOGRAM TRACING: CPT | Performed by: INTERNAL MEDICINE

## 2023-02-16 PROCEDURE — 2500000003 HC RX 250 WO HCPCS: Performed by: INTERNAL MEDICINE

## 2023-02-16 PROCEDURE — C1894 INTRO/SHEATH, NON-LASER: HCPCS | Performed by: INTERNAL MEDICINE

## 2023-02-16 PROCEDURE — 2709999900 HC NON-CHARGEABLE SUPPLY: Performed by: INTERNAL MEDICINE

## 2023-02-16 RX ORDER — SODIUM CHLORIDE 0.9 % (FLUSH) 0.9 %
5-40 SYRINGE (ML) INJECTION EVERY 12 HOURS SCHEDULED
Status: CANCELLED | OUTPATIENT
Start: 2023-02-16

## 2023-02-16 RX ORDER — SODIUM CHLORIDE 0.9 % (FLUSH) 0.9 %
5-40 SYRINGE (ML) INJECTION PRN
Status: CANCELLED | OUTPATIENT
Start: 2023-02-16

## 2023-02-16 RX ORDER — SODIUM CHLORIDE 9 MG/ML
INJECTION, SOLUTION INTRAVENOUS PRN
Status: CANCELLED | OUTPATIENT
Start: 2023-02-16

## 2023-02-16 RX ORDER — SODIUM CHLORIDE 9 MG/ML
INJECTION, SOLUTION INTRAVENOUS CONTINUOUS
Status: DISCONTINUED | OUTPATIENT
Start: 2023-02-16 | End: 2023-02-16 | Stop reason: HOSPADM

## 2023-02-16 RX ORDER — ACETAMINOPHEN 325 MG/1
650 TABLET ORAL EVERY 4 HOURS PRN
Status: CANCELLED | OUTPATIENT
Start: 2023-02-16

## 2023-02-16 RX ORDER — LIDOCAINE HYDROCHLORIDE 10 MG/ML
INJECTION, SOLUTION INFILTRATION; PERINEURAL PRN
Status: DISCONTINUED | OUTPATIENT
Start: 2023-02-16 | End: 2023-02-16 | Stop reason: HOSPADM

## 2023-02-16 RX ORDER — MIDAZOLAM HYDROCHLORIDE 1 MG/ML
INJECTION INTRAMUSCULAR; INTRAVENOUS PRN
Status: DISCONTINUED | OUTPATIENT
Start: 2023-02-16 | End: 2023-02-16 | Stop reason: HOSPADM

## 2023-02-16 RX ORDER — HEPARIN SODIUM 200 [USP'U]/100ML
INJECTION, SOLUTION INTRAVENOUS CONTINUOUS PRN
Status: DISCONTINUED | OUTPATIENT
Start: 2023-02-16 | End: 2023-02-16 | Stop reason: HOSPADM

## 2023-02-16 RX ORDER — ASPIRIN 81 MG/1
324 TABLET, CHEWABLE ORAL DAILY
Status: DISCONTINUED | OUTPATIENT
Start: 2023-02-16 | End: 2023-02-16 | Stop reason: HOSPADM

## 2023-02-16 ASSESSMENT — PAIN SCALES - GENERAL
PAINLEVEL_OUTOF10: 0
PAINLEVEL_OUTOF10: 0

## 2023-02-16 NOTE — PROGRESS NOTES
TRANSFER - IN REPORT:    Verbal report received from KILEY Vizcaino on Octavio Parsons being received from [facility] for routine post-op      Report consisted of patients Situation, Background, Assessment and Recommendations(SBAR). Information from the following report(s) Procedure Summary was reviewed with the receiving nurse. Opportunity for questions and clarification was provided. Assessment completed upon patients arrival to unit and care assumed.

## 2023-02-16 NOTE — PROGRESS NOTES
[facility] CARDIOLOGY  [address], [address] [facility], [address] [place] [address]  PHONE: [phone]      To Whom It May Concern:          Sheryl Selby was in our facility for a scheduled procedure 2/16/23 . May return to work 2/22/23 with no lifting restrictions . Please consider this an excused absence. If you have any questions feel free to contact [facility] Cardiology.   Per Jm Tesfaye RN

## 2023-02-16 NOTE — PROGRESS NOTES
TRANSFER - OUT REPORT:    Verbal report given to RN on Delisa Mac  being transferred to [facility] for routine progression of patient care       Report consisted of patient's Situation, Background, Assessment and   Recommendations(SBAR). Information from the following report(s) Nurse Handoff Report was reviewed with the receiving nurse. [place] Assessment: No data recorded  Lines:   Peripheral IV 02/16/23 Left;Posterior Hand (Active)        Opportunity for questions and clarification was provided.       Patient transported with:  Registered Nurse    C macho Celaya  Diagnostic  RRA - 12    2 mg Versed  25 mcg Fentanyl  5000 units Heparin

## 2023-02-16 NOTE — PROGRESS NOTES
Patient received to [address] [place] [address] # 12  Ambulatory from [facility]. Patient scheduled for [facility] today with Dr Richie Omalley. Procedure reviewed & questions answered, voiced good understanding consent obtained & placed on chart. All medications and medical history reviewed. Will prep patient per orders. Patient & family updated on plan of care. The patient has a fraility score of 3-MANAGING WELL, based on ambulation without assistance.    Patient took Aspirin 324mg and plavix 75mg today at 0900 prior to arrival.

## 2023-02-16 NOTE — DISCHARGE INSTRUCTIONS
HEART CATHETERIZATION/ANGIOGRAPHY DISCHARGE INSTRUCTIONS    Check puncture site frequently for swelling or bleeding. If there is any bleeding, apply pressure over the area with a clean towel or washcloth and call 911. Notify your doctor for any redness, swelling, drainage, or oozing from the puncture site. Notify your doctor for any fever or chills. If the extremity becomes cold, numb, or painful call [facility] Cardiology  Activity should be limited for the next 48 hours. No heavy lifting, pushing, pulling  or strenuous activity for 48 hours. No heavy lifting (anything over 10 pounds) for 3 days. You may resume your usual diet. Drink more fluids than usual.  Have a responsible person drive you home and stay with you for at least 24 hours after your heart catheterization/angiography. You may remove bandage from your R wrist in 24 hours. You may shower in 24 hours. No tub baths, hot tubs, or swimming for 1 week. Do not place any lotions, creams, powders, or ointments over puncture site for 1 week. You may place a clean band-aid over the puncture site each day for 5 days. Change daily. Sedation for a Medical Procedure: Care Instructions     You were given a sedative medication during your visit. While many of the effects will have worn   off before you leave; you may continue to feel some effects for several hours. Common side effects from sedation include:  Feeling sleepy. (Your doctors and nurses will make sure you are not too sleepy to go home.)  Nausea and vomiting. This usually does not last long. Feeling tired. How can you care for yourself at home? Activity    Don't do anything for 24 hours that requires attention to detail. It takes time for the medicine effects to completely wear off. Do not make important legal decisions for 24 hours. Do not sign any legal documents for 24 hours.      Do not drink alcohol today     For your safety, you should not drive or operate heavy machinery for the remainder of the day     Rest when you feel tired. Getting enough sleep will help you recover. Diet    You can eat your normal diet, unless your doctor gives you other instructions. If your stomach is upset, try clear liquids and bland, low-fat foods like plain toast or rice. Drink plenty of fluids (unless your doctor tells you not to). Don't drink alcohol for 24 hours. Medicines    Be safe with medicines. Read and follow all instructions on the label. If the doctor gave you a prescription medicine for pain, take it as prescribed. If you are not taking a prescription pain medicine, ask your doctor if you can take an over-the-counter medicine. If you think your pain medicine is making you sick to your stomach: Take your medicine after meals (unless your doctor has told you not to). Ask your doctor for a different pain medicine. I have read the above instructions and have had the opportunity to ask questions.       Patient: ________________________   Date: _____________    Witness: _______________________   Date: _____________

## 2023-03-14 ENCOUNTER — OFFICE VISIT (OUTPATIENT)
Dept: CARDIOLOGY CLINIC | Age: 58
End: 2023-03-14
Payer: COMMERCIAL

## 2023-03-14 VITALS
BODY MASS INDEX: 35.47 KG/M2 | WEIGHT: 226 LBS | HEIGHT: 67 IN | DIASTOLIC BLOOD PRESSURE: 80 MMHG | HEART RATE: 76 BPM | SYSTOLIC BLOOD PRESSURE: 152 MMHG

## 2023-03-14 DIAGNOSIS — E78.2 MIXED HYPERLIPIDEMIA: ICD-10-CM

## 2023-03-14 DIAGNOSIS — I25.10 CORONARY ARTERY DISEASE INVOLVING NATIVE CORONARY ARTERY OF NATIVE HEART WITHOUT ANGINA PECTORIS: Primary | ICD-10-CM

## 2023-03-14 DIAGNOSIS — I10 ESSENTIAL HYPERTENSION, BENIGN: ICD-10-CM

## 2023-03-14 PROCEDURE — 3077F SYST BP >= 140 MM HG: CPT | Performed by: INTERNAL MEDICINE

## 2023-03-14 PROCEDURE — 3079F DIAST BP 80-89 MM HG: CPT | Performed by: INTERNAL MEDICINE

## 2023-03-14 PROCEDURE — 99214 OFFICE O/P EST MOD 30 MIN: CPT | Performed by: INTERNAL MEDICINE

## 2023-03-14 RX ORDER — AMLODIPINE BESYLATE 5 MG/1
5 TABLET ORAL DAILY
Qty: 30 TABLET | Refills: 3 | Status: SHIPPED | OUTPATIENT
Start: 2023-03-14

## 2023-03-14 NOTE — PROGRESS NOTES
[facility] CARDIOLOGY  [address] [facility], [address]  PHONE: [phone]      23    NAME:  Go Yap  : 1965  MRN: [id number]       SUBJECTIVE:   Go Yap is a 59 y.o. female seen for a follow up visit regarding the following:     Chief Complaint   Patient presents with    Coronary Artery Disease         HPI:    CP persists- mild cad at recent cath. Holder persists as well. No orthopnea and not tolerating cpap. Past Medical History, Past Surgical History, Family history, Social History, and Medications were all reviewed with the patient today and updated as necessary. Current Outpatient Medications   Medication Sig Dispense Refill    amLODIPine (NORVASC) 5 MG tablet Take 1 tablet by mouth daily 30 tablet 3    clopidogrel (PLAVIX) 75 MG tablet Take 1 tablet by mouth daily 60 tablet 3    baclofen (LIORESAL) 10 MG tablet 1-2 po tid prn spasm - causes sleepiness 40 tablet 2    albuterol sulfate HFA (PROVENTIL;VENTOLIN;PROAIR) 108 (90 Base) MCG/ACT inhaler 2 puffs 4 times daily if needed for shortness of breath or wheezing 3 each 3    umeclidinium-vilanterol (ANORO ELLIPTA) 62.5-25 MCG/INH AEPB inhaler 1 inhalation every morning, rinse mouth after use 3 each 3    Evolocumab 140 MG/ML SOAJ Inject 140 mg into the skin every 14 days 6 Adjustable Dose Pre-filled Pen Syringe 3    aspirin 81 MG EC tablet Take 81 mg by mouth daily       No current facility-administered medications for this visit. Social History     Tobacco Use    Smoking status: Every Day     Packs/day: 1.50     Years: 43.00     Pack years: 64.50     Types: Cigarettes    Smokeless tobacco: Never    Tobacco comments:     Quit smoking: taking chantix   Substance Use Topics    Alcohol use: Not Currently              PHYSICAL EXAM:   BP (!) 152/80   Pulse 76   Ht 5' 7\" (1.702 m)   Wt 226 lb (102.5 kg)   BMI 35.40 kg/m²    Constitutional: Oriented to person, place, and time.  Appears well-developed and well-nourished. Head: Normocephalic and atraumatic. Neck: Neck supple. Cardiovascular: Normal rate and regular rhythm with no murmur -No JVP  Pulmonary/Chest: Breath sounds normal.   Abdominal: Soft. Musculoskeletal: No edema. Neurological: Alert and oriented to person, place, and time. Skin: Skin is warm and dry. Psychiatric: Normal mood and affect. Vitals reviewed. Wt Readings from Last 3 Encounters:   03/14/23 226 lb (102.5 kg)   02/16/23 225 lb (102.1 kg)   02/14/23 225 lb (102.1 kg)       Medical problems and test results were reviewed with the patient today. ASSESSMENT and PLAN    1. Coronary artery disease involving native coronary artery of native heart without angina pectoris  Mild cad at cath- add norvasc 5mg qd for bp and possible microcirculatory angina    2. Mixed hyperlipidemia  Stable. Continue repatha      3. Essential hypertension, benign  Worse- add norvasc as above     4. Pre-op back- moderate cv risk- may stop plavix 7 days- stay on asa    Return in about 2 months (around 5/14/2023).          Vineet Pulliam MD  3/14/2023  3:15 PM

## 2023-05-09 ENCOUNTER — OFFICE VISIT (OUTPATIENT)
Dept: PULMONOLOGY | Age: 58
End: 2023-05-09
Payer: COMMERCIAL

## 2023-05-09 VITALS
DIASTOLIC BLOOD PRESSURE: 88 MMHG | HEART RATE: 78 BPM | WEIGHT: 226 LBS | OXYGEN SATURATION: 98 % | HEIGHT: 67 IN | TEMPERATURE: 97 F | SYSTOLIC BLOOD PRESSURE: 142 MMHG | BODY MASS INDEX: 35.47 KG/M2

## 2023-05-09 DIAGNOSIS — G47.33 OBSTRUCTIVE SLEEP APNEA (ADULT) (PEDIATRIC): ICD-10-CM

## 2023-05-09 DIAGNOSIS — F17.218 CIGARETTE NICOTINE DEPENDENCE WITH OTHER NICOTINE-INDUCED DISORDER: ICD-10-CM

## 2023-05-09 DIAGNOSIS — R91.8 LUNG NODULES: ICD-10-CM

## 2023-05-09 DIAGNOSIS — J43.9 PULMONARY EMPHYSEMA, UNSPECIFIED EMPHYSEMA TYPE (HCC): ICD-10-CM

## 2023-05-09 DIAGNOSIS — J44.9 COPD, MILD (HCC): Primary | ICD-10-CM

## 2023-05-09 LAB
EXPIRATORY TIME: NORMAL
FEF 25-75% %PRED-PRE: NORMAL
FEF 25-75% PRED: NORMAL
FEF 25-75%-PRE: NORMAL
FEV1 %PRED-PRE: 69 %
FEV1 PRED: NORMAL
FEV1/FVC %PRED-PRE: NORMAL
FEV1/FVC PRED: NORMAL
FEV1/FVC: 74 %
FEV1: 1.97 L
FVC %PRED-PRE: 72 %
FVC PRED: NORMAL
FVC: 2.67 L
PEF %PRED-PRE: NORMAL
PEF PRED: NORMAL
PEF-PRE: NORMAL

## 2023-05-09 PROCEDURE — 94010 BREATHING CAPACITY TEST: CPT | Performed by: NURSE PRACTITIONER

## 2023-05-09 PROCEDURE — 99214 OFFICE O/P EST MOD 30 MIN: CPT | Performed by: NURSE PRACTITIONER

## 2023-05-09 PROCEDURE — 3077F SYST BP >= 140 MM HG: CPT | Performed by: NURSE PRACTITIONER

## 2023-05-09 PROCEDURE — G0296 VISIT TO DETERM LDCT ELIG: HCPCS | Performed by: NURSE PRACTITIONER

## 2023-05-09 PROCEDURE — 3079F DIAST BP 80-89 MM HG: CPT | Performed by: NURSE PRACTITIONER

## 2023-05-09 ASSESSMENT — ENCOUNTER SYMPTOMS
WHEEZING: 1
HEMOPTYSIS: 0
SPUTUM PRODUCTION: 1
COUGH: 1
SHORTNESS OF BREATH: 1

## 2023-05-09 ASSESSMENT — PULMONARY FUNCTION TESTS
FEV1_PERCENT_PREDICTED_PRE: 69
FVC: 2.67
FVC_PERCENT_PREDICTED_PRE: 72
FEV1: 1.97
FEV1/FVC: 74

## 2023-05-09 NOTE — PATIENT INSTRUCTIONS
Continue Anoro 1 inhalation every morning. Albuterol 2 puffs 4 times daily if needed for shortness of breath or wheezing. Screening CT in December, I will call her with results. We will request follow-up appointment in the sleep center for management of CPAP therapy. Smoking cessation is encouraged. Advised to set quit date, decrease consumption toward quit date and use of OTC nicotine patch, lozenge or gum per package directions. She is commended in decreased tobacco consumption as far. Follow-up in 6 months with spirometry, after CT, sooner if needed.

## 2023-05-09 NOTE — PROGRESS NOTES
She is a pleasant 59 yr old female seen today due to COPD, shortness of breath,  tobacco use. Complete pulmonary function test demonstrated borderline restriction but this was not confirmed by lung volumes. There was mild reduction in diffusion capacity and no significant bronchodilator response. Screening CT demonstrated no worrisome nodules, minimal scarring at the right lung base. There was no evidence of interstitial disease. Mild emphysematous changes, upper lobe predominant. History also notable for COVID 1/2021 - had mild symptoms;  CAD and stent. She is trying to quit smoking, nicotine patch was ineffective for her. LDCT 12/23/2022-emphysematous changes. Groundglass nodular density in the LLL, 3 mm, unchanged. Scattered micronodules in the upper lobes, unchanged. DIAGNOSTICS:     Chest CT 8/22/2013-no pulmonary infiltrate or nodule identified. CXR 10/20/2020-lungs are expanded without evidence of acute cardiopulmonary process. Spirometry 10/2020-suggestive of mild restrictive defect, although technically limited. Ambulatory oximetry 10/20/2020-baseline saturation 98% room air at rest, 97% room air with ambulation. Baseline heart rate 73 and maximum 95 with ambulation. LDCT 10/28/2020 -mild upper lobe predominant emphysematous changes. No evidence of nodule. Minimal scarring and/or atelectasis. CPFT's 11/19/2020 - Borderline restriction not confirmed by lung volumes. Mild reduction in diffusion capacity. No bronchodilator response. Normal alpha-1 level/genotype 12/2020. PSG 12/21/2020-mild obstructive sleep apnea becoming moderate with REM sleep and associated with moderate hypoxia. Insomnia. Inadequate sleep hygiene. Sleep related movement disorder. Spirometry 5/28/2021-mild restrictive defect, no significant change. LDCT of chest 12/8/2021 clear lungs, no nodule or mass.     Spirometry 11/9/2022-mild-moderate restrictive defect, trend toward worsening
morning. Albuterol 2 puffs 4 times daily if needed for shortness of breath or wheezing. Screening CT in December, I will call her with results. We will request follow-up appointment in the sleep center for management of CPAP therapy. Smoking cessation is encouraged. Advised to set quit date, decrease consumption toward quit date and use of OTC nicotine patch, lozenge or gum per package directions. She is commended in decreased tobacco consumption as far. Follow-up in 6 months with spirometry, after CT, sooner if needed. Orders Placed This Encounter   Procedures    CT LUNG SCREENING     Age: 59 y.o. Smoking History:   Social History    Tobacco Use      Smoking status: Every Day        Packs/day: 1.50        Years: 43.00        Pack years: 64.5        Types: Cigarettes      Smokeless tobacco: Never      Tobacco comments: Quit smoking: taking chantix    Vaping Use      Vaping Use: Never used    Alcohol use: Not Currently    Drug use: No    Pack years: 64.5  12/23/2022     Standing Status:   Future     Standing Expiration Date:   11/9/2024     Order Specific Question:   Is there documentation of shared decision making? Answer:   Yes     Order Specific Question:   Does the patient show any signs or symptoms of lung cancer? Answer:   No     Order Specific Question:   Is this the first (baseline) CT or an annual exam?     Answer: Annual [2]     Order Specific Question:   Is this a low dose CT or a routine CT? Answer:   Low Dose CT [1]     Order Specific Question:   Smoking Status? Answer:   Every Day [1]     Order Specific Question:   Smoking packs per day? Answer:   1.5     Order Specific Question:   Years smoking? Answer:   43    Spirometry Without Bronchodilator    HI VISIT TO DISCUSS LUNG CA SCREEN W LDCT         RICHARD SHAH, APRN - CNP    Total  time spent with patient - 47 min.      Collaborating MD: Dr. Rasheed Badillo:    She is a pleasant 59
(PROVENTIL;VENTOLIN;PROAIR) 108 (90 Base) MCG/ACT inhaler 2 puffs 4 times daily if needed for shortness of breath or wheezing    umeclidinium-vilanterol (ANORO ELLIPTA) 62.5-25 MCG/INH AEPB inhaler 1 inhalation every morning, rinse mouth after use    Evolocumab 140 MG/ML SOAJ Inject 140 mg into the skin every 14 days    aspirin 81 MG EC tablet Take 1 tablet by mouth daily     No current facility-administered medications for this visit. Over 50% of today's office visit was spent in face to face time reviewing test results, prognosis, importance of compliance, education about disease process, benefits of medications, instructions for management of acute symptoms, and follow up plans. Electronically signed. Dictated using voice recognition software.   Proof read but unrecognized errors may exist.

## 2023-05-18 ENCOUNTER — TELEPHONE (OUTPATIENT)
Dept: SLEEP MEDICINE | Age: 58
End: 2023-05-18

## 2023-07-06 ENCOUNTER — OFFICE VISIT (OUTPATIENT)
Age: 58
End: 2023-07-06
Payer: COMMERCIAL

## 2023-07-06 VITALS
HEIGHT: 67 IN | DIASTOLIC BLOOD PRESSURE: 88 MMHG | WEIGHT: 222 LBS | BODY MASS INDEX: 34.84 KG/M2 | SYSTOLIC BLOOD PRESSURE: 132 MMHG | HEART RATE: 80 BPM

## 2023-07-06 DIAGNOSIS — I25.10 CORONARY ARTERY DISEASE INVOLVING NATIVE CORONARY ARTERY OF NATIVE HEART WITHOUT ANGINA PECTORIS: Primary | ICD-10-CM

## 2023-07-06 DIAGNOSIS — E78.2 MIXED HYPERLIPIDEMIA: ICD-10-CM

## 2023-07-06 DIAGNOSIS — I10 ESSENTIAL HYPERTENSION, BENIGN: ICD-10-CM

## 2023-07-06 PROCEDURE — 99214 OFFICE O/P EST MOD 30 MIN: CPT | Performed by: INTERNAL MEDICINE

## 2023-07-06 PROCEDURE — 3079F DIAST BP 80-89 MM HG: CPT | Performed by: INTERNAL MEDICINE

## 2023-07-06 PROCEDURE — 3075F SYST BP GE 130 - 139MM HG: CPT | Performed by: INTERNAL MEDICINE

## 2023-07-11 ASSESSMENT — ENCOUNTER SYMPTOMS
BLURRED VISION: 1
ORTHOPNEA: 0

## 2023-07-12 ENCOUNTER — OFFICE VISIT (OUTPATIENT)
Dept: NEUROSURGERY | Age: 58
End: 2023-07-12
Payer: COMMERCIAL

## 2023-07-12 ENCOUNTER — OFFICE VISIT (OUTPATIENT)
Dept: FAMILY MEDICINE CLINIC | Facility: CLINIC | Age: 58
End: 2023-07-12
Payer: COMMERCIAL

## 2023-07-12 VITALS
TEMPERATURE: 98.2 F | RESPIRATION RATE: 16 BRPM | SYSTOLIC BLOOD PRESSURE: 131 MMHG | HEIGHT: 67 IN | HEART RATE: 78 BPM | BODY MASS INDEX: 35.31 KG/M2 | WEIGHT: 225 LBS | DIASTOLIC BLOOD PRESSURE: 52 MMHG | OXYGEN SATURATION: 96 %

## 2023-07-12 VITALS
WEIGHT: 224 LBS | TEMPERATURE: 96.8 F | HEIGHT: 67 IN | OXYGEN SATURATION: 95 % | BODY MASS INDEX: 35.16 KG/M2 | SYSTOLIC BLOOD PRESSURE: 125 MMHG | HEART RATE: 74 BPM | DIASTOLIC BLOOD PRESSURE: 56 MMHG

## 2023-07-12 DIAGNOSIS — E11.9 TYPE 2 DIABETES MELLITUS WITHOUT COMPLICATION, WITHOUT LONG-TERM CURRENT USE OF INSULIN (HCC): ICD-10-CM

## 2023-07-12 DIAGNOSIS — R63.5 WEIGHT GAIN: ICD-10-CM

## 2023-07-12 DIAGNOSIS — M54.16 LUMBAR RADICULOPATHY: ICD-10-CM

## 2023-07-12 DIAGNOSIS — M43.16 SPONDYLOLISTHESIS AT L4-L5 LEVEL: Primary | ICD-10-CM

## 2023-07-12 DIAGNOSIS — E78.2 MIXED HYPERLIPIDEMIA: ICD-10-CM

## 2023-07-12 DIAGNOSIS — I25.10 ATHEROSCLEROSIS OF NATIVE CORONARY ARTERY OF NATIVE HEART WITHOUT ANGINA PECTORIS: ICD-10-CM

## 2023-07-12 DIAGNOSIS — I10 ESSENTIAL HYPERTENSION, BENIGN: ICD-10-CM

## 2023-07-12 DIAGNOSIS — F39 MOOD DISORDER (HCC): ICD-10-CM

## 2023-07-12 DIAGNOSIS — M48.02 SPINAL STENOSIS IN CERVICAL REGION: ICD-10-CM

## 2023-07-12 DIAGNOSIS — S29.012A STRAIN OF THORACIC BACK REGION: ICD-10-CM

## 2023-07-12 DIAGNOSIS — M48.062 SPINAL STENOSIS, LUMBAR REGION WITH NEUROGENIC CLAUDICATION: Primary | ICD-10-CM

## 2023-07-12 LAB
BASOPHILS # BLD: 0 K/UL (ref 0–0.2)
BASOPHILS NFR BLD: 0 % (ref 0–2)
DIFFERENTIAL METHOD BLD: NORMAL
EOSINOPHIL # BLD: 0.2 K/UL (ref 0–0.8)
EOSINOPHIL NFR BLD: 3 % (ref 0.5–7.8)
ERYTHROCYTE [DISTWIDTH] IN BLOOD BY AUTOMATED COUNT: 14.6 % (ref 11.9–14.6)
HCT VFR BLD AUTO: 44 % (ref 35.8–46.3)
HGB BLD-MCNC: 13.8 G/DL (ref 11.7–15.4)
IMM GRANULOCYTES # BLD AUTO: 0 K/UL (ref 0–0.5)
IMM GRANULOCYTES NFR BLD AUTO: 0 % (ref 0–5)
LYMPHOCYTES # BLD: 2 K/UL (ref 0.5–4.6)
LYMPHOCYTES NFR BLD: 30 % (ref 13–44)
MCH RBC QN AUTO: 27.4 PG (ref 26.1–32.9)
MCHC RBC AUTO-ENTMCNC: 31.4 G/DL (ref 31.4–35)
MCV RBC AUTO: 87.5 FL (ref 82–102)
MONOCYTES # BLD: 0.4 K/UL (ref 0.1–1.3)
MONOCYTES NFR BLD: 6 % (ref 4–12)
NEUTS SEG # BLD: 4.1 K/UL (ref 1.7–8.2)
NEUTS SEG NFR BLD: 61 % (ref 43–78)
NRBC # BLD: 0 K/UL (ref 0–0.2)
PLATELET # BLD AUTO: 285 K/UL (ref 150–450)
PMV BLD AUTO: 10.3 FL (ref 9.4–12.3)
RBC # BLD AUTO: 5.03 M/UL (ref 4.05–5.2)
WBC # BLD AUTO: 6.8 K/UL (ref 4.3–11.1)

## 2023-07-12 PROCEDURE — 3078F DIAST BP <80 MM HG: CPT | Performed by: FAMILY MEDICINE

## 2023-07-12 PROCEDURE — 3078F DIAST BP <80 MM HG: CPT | Performed by: NURSE PRACTITIONER

## 2023-07-12 PROCEDURE — 3074F SYST BP LT 130 MM HG: CPT | Performed by: FAMILY MEDICINE

## 2023-07-12 PROCEDURE — 3074F SYST BP LT 130 MM HG: CPT | Performed by: NURSE PRACTITIONER

## 2023-07-12 PROCEDURE — 99214 OFFICE O/P EST MOD 30 MIN: CPT | Performed by: FAMILY MEDICINE

## 2023-07-12 PROCEDURE — 99213 OFFICE O/P EST LOW 20 MIN: CPT | Performed by: NURSE PRACTITIONER

## 2023-07-12 RX ORDER — MELOXICAM 15 MG/1
TABLET ORAL
COMMUNITY
Start: 2023-06-11

## 2023-07-12 RX ORDER — TIRZEPATIDE 2.5 MG/.5ML
INJECTION, SOLUTION SUBCUTANEOUS
Qty: 4 ADJUSTABLE DOSE PRE-FILLED PEN SYRINGE | Refills: 0 | Status: SHIPPED | OUTPATIENT
Start: 2023-07-12

## 2023-07-12 RX ORDER — TIRZEPATIDE 5 MG/.5ML
INJECTION, SOLUTION SUBCUTANEOUS
Qty: 4 ADJUSTABLE DOSE PRE-FILLED PEN SYRINGE | Refills: 5 | Status: SHIPPED | OUTPATIENT
Start: 2023-07-12

## 2023-07-12 RX ORDER — BACLOFEN 10 MG/1
TABLET ORAL
Qty: 40 TABLET | Refills: 5 | Status: SHIPPED | OUTPATIENT
Start: 2023-07-12

## 2023-07-12 SDOH — ECONOMIC STABILITY: FOOD INSECURITY: WITHIN THE PAST 12 MONTHS, THE FOOD YOU BOUGHT JUST DIDN'T LAST AND YOU DIDN'T HAVE MONEY TO GET MORE.: NEVER TRUE

## 2023-07-12 SDOH — ECONOMIC STABILITY: HOUSING INSECURITY
IN THE LAST 12 MONTHS, WAS THERE A TIME WHEN YOU DID NOT HAVE A STEADY PLACE TO SLEEP OR SLEPT IN A SHELTER (INCLUDING NOW)?: NO

## 2023-07-12 SDOH — ECONOMIC STABILITY: FOOD INSECURITY: WITHIN THE PAST 12 MONTHS, YOU WORRIED THAT YOUR FOOD WOULD RUN OUT BEFORE YOU GOT MONEY TO BUY MORE.: NEVER TRUE

## 2023-07-12 SDOH — ECONOMIC STABILITY: INCOME INSECURITY: HOW HARD IS IT FOR YOU TO PAY FOR THE VERY BASICS LIKE FOOD, HOUSING, MEDICAL CARE, AND HEATING?: NOT HARD AT ALL

## 2023-07-12 ASSESSMENT — PATIENT HEALTH QUESTIONNAIRE - PHQ9
2. FEELING DOWN, DEPRESSED OR HOPELESS: 3
SUM OF ALL RESPONSES TO PHQ QUESTIONS 1-9: 18
9. THOUGHTS THAT YOU WOULD BE BETTER OFF DEAD, OR OF HURTING YOURSELF: 0
SUM OF ALL RESPONSES TO PHQ QUESTIONS 1-9: 18
8. MOVING OR SPEAKING SO SLOWLY THAT OTHER PEOPLE COULD HAVE NOTICED. OR THE OPPOSITE, BEING SO FIGETY OR RESTLESS THAT YOU HAVE BEEN MOVING AROUND A LOT MORE THAN USUAL: 1
1. LITTLE INTEREST OR PLEASURE IN DOING THINGS: 3
4. FEELING TIRED OR HAVING LITTLE ENERGY: 3
7. TROUBLE CONCENTRATING ON THINGS, SUCH AS READING THE NEWSPAPER OR WATCHING TELEVISION: 1
3. TROUBLE FALLING OR STAYING ASLEEP: 3
SUM OF ALL RESPONSES TO PHQ9 QUESTIONS 1 & 2: 6
SUM OF ALL RESPONSES TO PHQ QUESTIONS 1-9: 18
6. FEELING BAD ABOUT YOURSELF - OR THAT YOU ARE A FAILURE OR HAVE LET YOURSELF OR YOUR FAMILY DOWN: 1
10. IF YOU CHECKED OFF ANY PROBLEMS, HOW DIFFICULT HAVE THESE PROBLEMS MADE IT FOR YOU TO DO YOUR WORK, TAKE CARE OF THINGS AT HOME, OR GET ALONG WITH OTHER PEOPLE: 1
SUM OF ALL RESPONSES TO PHQ QUESTIONS 1-9: 18
5. POOR APPETITE OR OVEREATING: 3

## 2023-07-12 ASSESSMENT — ENCOUNTER SYMPTOMS
WHEEZING: 0
BACK PAIN: 1
DIARRHEA: 0
BOWEL INCONTINENCE: 0
COLOR CHANGE: 0
SHORTNESS OF BREATH: 0
VOMITING: 0
COUGH: 0
RHINORRHEA: 0
CONSTIPATION: 0
ABDOMINAL PAIN: 0
NAUSEA: 0

## 2023-07-12 NOTE — PROGRESS NOTES
by her previous specialists for the above chronic issues. She is stable on the current treatment and tolerating it well. No significant sides effects. Will not adjust therapy at this time, unless noted above. We will continue to monitor for any problems. Medications refilled and lab work has been ordered where needed. Reviewed medications are explained including any potential interactions or side effects in detail. The patient's questions were answered. She  understands the above and has no further questions. Further workup and treatment should be done if symptoms persist, worsen or new symptoms occur. She  will call to notify us of any problems, complications or worsening symptoms. Follow-up and Dispositions    Return in about 3 months (around 10/12/2023) for labs today. There are no Patient Instructions on file for this visit. (Some details in this note may have been created with speech-recognition software. Some errors in speech recognition may have occurred.    Most of those have been corrected but some may have been missed.)         Gray Maynard MD  [facility]  [address]  Phone [phone]

## 2023-07-12 NOTE — PROGRESS NOTES
protrusion  Sternocleidomastoid and trapezius 5/5  No mid-line cervical, thoracic, or lumbar tenderness to palpation   Patient with strength exam as follows:   Upper Extremities: Right Left      Deltoid  5 5    Biceps  5 5    Triceps  5 5      5 5   Hand Intrinsics  5 5  Wrist flexors/extensors  5 5     Lower Extremities:      Hip Flex 5 5    Quads  5 5    Hamstrings 5 5    Dorsiflex 5 5    Plantarflex 5 5    EHL  5 5  Sensation intact to light touch and pin-prick   DTR 2+  No clonus or babinski present   No Rivers's sign present bilaterally   Gait mildly antalgic    Assessment & Plan:  Sally Thomas is a 59 y.o. female who presents to follow-up after completing her physical therapy. Because the patient symptoms had worsened and so improved with physical therapy I am sending her for a lumbar MRI without contrast.  The patient will follow-up with Dr. Akhil Faria to discuss her surgical options. No diagnosis found. Notes are transcribed with [email or web address], a medical voice recording dictation service, and may contain minor errors.     Michael Decker NP  [facility]

## 2023-07-13 LAB
ALBUMIN SERPL-MCNC: 3.5 G/DL (ref 3.5–5)
ALBUMIN/GLOB SERPL: 0.9 (ref 0.4–1.6)
ALP SERPL-CCNC: 102 U/L (ref 50–136)
ALT SERPL-CCNC: 53 U/L (ref 12–65)
ANION GAP SERPL CALC-SCNC: 7 MMOL/L (ref 2–11)
AST SERPL-CCNC: 38 U/L (ref 15–37)
BILIRUB SERPL-MCNC: 0.2 MG/DL (ref 0.2–1.1)
BUN SERPL-MCNC: 12 MG/DL (ref 6–23)
CALCIUM SERPL-MCNC: 9.2 MG/DL (ref 8.3–10.4)
CHLORIDE SERPL-SCNC: 108 MMOL/L (ref 101–110)
CHOLEST SERPL-MCNC: 156 MG/DL
CO2 SERPL-SCNC: 27 MMOL/L (ref 21–32)
CREAT SERPL-MCNC: 0.8 MG/DL (ref 0.6–1)
CREAT UR-MCNC: 73 MG/DL
EST. AVERAGE GLUCOSE BLD GHB EST-MCNC: 137 MG/DL
GLOBULIN SER CALC-MCNC: 3.8 G/DL (ref 2.8–4.5)
GLUCOSE SERPL-MCNC: 161 MG/DL (ref 65–100)
HBA1C MFR BLD: 6.4 % (ref 4.8–5.6)
HDLC SERPL-MCNC: 36 MG/DL (ref 40–60)
HDLC SERPL: 4.3
LDLC SERPL CALC-MCNC: 66.6 MG/DL
MICROALBUMIN UR-MCNC: 0.86 MG/DL
MICROALBUMIN/CREAT UR-RTO: 12 MG/G (ref 0–30)
POTASSIUM SERPL-SCNC: 3.6 MMOL/L (ref 3.5–5.1)
PROT SERPL-MCNC: 7.3 G/DL (ref 6.3–8.2)
SODIUM SERPL-SCNC: 142 MMOL/L (ref 133–143)
TRIGL SERPL-MCNC: 267 MG/DL (ref 35–150)
TSH, 3RD GENERATION: 1.15 UIU/ML (ref 0.36–3.74)
VLDLC SERPL CALC-MCNC: 53.4 MG/DL (ref 6–23)

## 2023-07-16 NOTE — RESULT ENCOUNTER NOTE
Your A1c is stable at 6.4. Your triglycerides are elevated. Triglycerides are the sugar part of your cholesterol. Your good cholesterol, HDL, is on the low side. Improving that level is hard to do. Make sure you are eating a heart healthy nutritious diet, getting regular aerobic physical activity, maintaining desired appropriate body weight and avoiding tobacco products. Recheck lab work with an appointment in 3-6 months. Your lab work revealed mild elevation of 1 liver enzymes. That is a nonspecific finding but is usually a sign of liver inflammation. Make sure you cut out all alcohol and sugar. Work on trying to lose 10% of your body weight. We should recheck your levels with an office visit in 6 months.

## 2023-07-24 ENCOUNTER — TELEPHONE (OUTPATIENT)
Dept: NEUROSURGERY | Age: 58
End: 2023-07-24

## 2023-07-24 DIAGNOSIS — F40.240 CLAUSTROPHOBIA: Primary | ICD-10-CM

## 2023-07-24 RX ORDER — DIAZEPAM 2 MG/1
1 TABLET ORAL ONCE
Qty: 1 TABLET | Refills: 0 | Status: SHIPPED | OUTPATIENT
Start: 2023-07-24 | End: 2023-07-24

## 2023-07-24 RX ORDER — HYDROXYZINE PAMOATE 25 MG/1
25 CAPSULE ORAL ONCE
Qty: 1 CAPSULE | Refills: 0 | Status: SHIPPED | OUTPATIENT
Start: 2023-07-24 | End: 2023-07-24

## 2023-07-24 NOTE — TELEPHONE ENCOUNTER
Pt called in and needs her claustrophobia meds called in for her MRI. MRI is scheduled for Saturday 7.29.23. [facility]acy: [facility] [address]

## 2023-07-29 ENCOUNTER — HOSPITAL ENCOUNTER (OUTPATIENT)
Dept: MRI IMAGING | Age: 58
End: 2023-07-29
Payer: COMMERCIAL

## 2023-07-29 DIAGNOSIS — M54.16 LUMBAR RADICULOPATHY: ICD-10-CM

## 2023-07-29 DIAGNOSIS — M43.16 SPONDYLOLISTHESIS AT L4-L5 LEVEL: ICD-10-CM

## 2023-07-29 PROCEDURE — 72148 MRI LUMBAR SPINE W/O DYE: CPT

## 2023-08-08 ENCOUNTER — OFFICE VISIT (OUTPATIENT)
Dept: FAMILY MEDICINE CLINIC | Facility: CLINIC | Age: 58
End: 2023-08-08
Payer: COMMERCIAL

## 2023-08-08 VITALS
TEMPERATURE: 97.9 F | HEIGHT: 67 IN | WEIGHT: 222.9 LBS | HEART RATE: 75 BPM | RESPIRATION RATE: 16 BRPM | SYSTOLIC BLOOD PRESSURE: 139 MMHG | OXYGEN SATURATION: 95 % | BODY MASS INDEX: 34.99 KG/M2 | DIASTOLIC BLOOD PRESSURE: 67 MMHG

## 2023-08-08 DIAGNOSIS — E11.9 TYPE 2 DIABETES MELLITUS WITHOUT COMPLICATION, WITHOUT LONG-TERM CURRENT USE OF INSULIN (HCC): ICD-10-CM

## 2023-08-08 DIAGNOSIS — R10.30 LOWER ABDOMINAL PAIN: ICD-10-CM

## 2023-08-08 DIAGNOSIS — R10.13 ABDOMINAL DISCOMFORT, EPIGASTRIC: Primary | ICD-10-CM

## 2023-08-08 DIAGNOSIS — R82.90 ABNORMAL URINALYSIS: ICD-10-CM

## 2023-08-08 LAB
BACTERIA URNS QL MICRO: 0 /HPF
BILIRUBIN, URINE, POC: NEGATIVE
BLOOD URINE, POC: ABNORMAL
CASTS URNS QL MICRO: 0 /LPF
CRYSTALS URNS QL MICRO: 0 /LPF
EPI CELLS #/AREA URNS HPF: NORMAL /HPF
GLUCOSE URINE, POC: NEGATIVE
KETONES, URINE, POC: NEGATIVE
LEUKOCYTE ESTERASE, URINE, POC: NEGATIVE
MUCOUS THREADS URNS QL MICRO: 0 /LPF
NITRITE, URINE, POC: NEGATIVE
OTHER OBSERVATIONS: NORMAL
PH, URINE, POC: 5 (ref 4.6–8)
PROTEIN,URINE, POC: NEGATIVE
RBC #/AREA URNS HPF: NORMAL /HPF
SPECIFIC GRAVITY, URINE, POC: 1 (ref 1–1.03)
URINALYSIS CLARITY, POC: ABNORMAL
URINALYSIS COLOR, POC: YELLOW
UROBILINOGEN, POC: NORMAL
WBC URNS QL MICRO: NORMAL /HPF

## 2023-08-08 PROCEDURE — 99214 OFFICE O/P EST MOD 30 MIN: CPT | Performed by: PHYSICIAN ASSISTANT

## 2023-08-08 PROCEDURE — 81003 URINALYSIS AUTO W/O SCOPE: CPT | Performed by: PHYSICIAN ASSISTANT

## 2023-08-08 PROCEDURE — 3074F SYST BP LT 130 MM HG: CPT | Performed by: PHYSICIAN ASSISTANT

## 2023-08-08 PROCEDURE — 3044F HG A1C LEVEL LT 7.0%: CPT | Performed by: PHYSICIAN ASSISTANT

## 2023-08-08 PROCEDURE — 3078F DIAST BP <80 MM HG: CPT | Performed by: PHYSICIAN ASSISTANT

## 2023-08-08 RX ORDER — TIRZEPATIDE 5 MG/.5ML
INJECTION, SOLUTION SUBCUTANEOUS
Qty: 4 ADJUSTABLE DOSE PRE-FILLED PEN SYRINGE | Refills: 5 | Status: CANCELLED | OUTPATIENT
Start: 2023-08-08

## 2023-08-08 RX ORDER — TIRZEPATIDE 5 MG/.5ML
INJECTION, SOLUTION SUBCUTANEOUS
Qty: 4 ADJUSTABLE DOSE PRE-FILLED PEN SYRINGE | Refills: 5 | Status: SHIPPED | OUTPATIENT
Start: 2023-08-08

## 2023-08-08 RX ORDER — PANTOPRAZOLE SODIUM 40 MG/1
40 TABLET, DELAYED RELEASE ORAL
Qty: 30 TABLET | Refills: 1 | Status: SHIPPED | OUTPATIENT
Start: 2023-08-08

## 2023-08-08 NOTE — PROGRESS NOTES
mouth daily      meloxicam (MOBIC) 15 MG tablet TAKE 1 TABLET BY MOUTH ONCE DAILY FOR 10 DAYS (Patient not taking: Reported on 2023)      Tirzepatide (MOUNJARO) 5 MG/0.5ML SOPN SC injection Inject 0.5 mLs into the skin once a week after finishing the 2.5 mg dose for diabetes (Patient not taking: Reported on 2023) 4 Adjustable Dose Pre-filled Pen Syringe 5    amLODIPine (NORVASC) 5 MG tablet Take 1 tablet by mouth daily (Patient not taking: Reported on 2023) 30 tablet 3     No current facility-administered medications for this visit. Allergies: Allergies   Allergen Reactions    Hydrochlorothiazide Other (See Comments)     headache    Lamotrigine Other (See Comments)     Agitation    Venlafaxine Other (See Comments)     HEADACHES       Surgical History:  Past Surgical History:   Procedure Laterality Date    CARDIAC PROCEDURE N/A 2023    Left heart cath / coronary angiography performed by Willa Harris MD at [facility] CARDIAC CATH LAB    CHOLECYSTECTOMY      CHOLECYSTECTOMY, LAPAROSCOPIC      HYSTERECTOMY (CERVIX STATUS UNKNOWN)      partial    OTHER SURGICAL HISTORY      common iliac artery stent    AK UNLISTED PROCEDURE CARDIAC SURGERY      stents x1     VASCULAR SURGERY         Family History:  Family History   Problem Relation Age of Onset    Heart Disease Father          from CHF at 64       Social History:   Social History     Social History Narrative    Not on file      Social History     Socioeconomic History    Marital status:      Spouse name: Not on file    Number of children: Not on file    Years of education: Not on file    Highest education level: Not on file   Occupational History    Not on file   Tobacco Use    Smoking status: Every Day     Packs/day: 1.50     Years: 43.00     Pack years: 64.50     Types: Cigarettes     Passive exposure: Current    Smokeless tobacco: Never    Tobacco comments:     Currently smoking less than 1 pack/day.    Vaping Use    Vaping

## 2023-08-08 NOTE — PATIENT INSTRUCTIONS
*A referral has been placed to Gastroenteritis. They should contact you in the next 7-10 days to schedule. Please let us know if you do not hear from them. *Take the Protonix - one each morning for the upper abdominal pain. Recommend avoidance of caffeine, alcohol, citrus fruit/juices, carbonated beverages, mints, tomato products, fried greasy foods and spicy foods. Elevation of the head of bed and avoidance of meals 2-3 hours before bedtime is also recommended.

## 2023-08-09 ASSESSMENT — ENCOUNTER SYMPTOMS
SHORTNESS OF BREATH: 0
VOMITING: 0
NAUSEA: 0
ABDOMINAL DISTENTION: 0
CONSTIPATION: 0
ABDOMINAL PAIN: 1
BLOOD IN STOOL: 0
DIARRHEA: 0
BACK PAIN: 0

## 2023-08-11 LAB
BACTERIA SPEC CULT: NORMAL
SERVICE CMNT-IMP: NORMAL

## 2023-08-18 ENCOUNTER — TELEPHONE (OUTPATIENT)
Dept: PULMONOLOGY | Age: 58
End: 2023-08-18

## 2023-08-21 ENCOUNTER — OFFICE VISIT (OUTPATIENT)
Dept: NEUROSURGERY | Age: 58
End: 2023-08-21
Payer: COMMERCIAL

## 2023-08-21 VITALS
DIASTOLIC BLOOD PRESSURE: 56 MMHG | BODY MASS INDEX: 35 KG/M2 | SYSTOLIC BLOOD PRESSURE: 131 MMHG | WEIGHT: 223 LBS | OXYGEN SATURATION: 97 % | TEMPERATURE: 97.2 F | HEART RATE: 78 BPM | HEIGHT: 67 IN

## 2023-08-21 DIAGNOSIS — M54.16 LUMBAR RADICULOPATHY: ICD-10-CM

## 2023-08-21 DIAGNOSIS — M48.062 LUMBAR STENOSIS WITH NEUROGENIC CLAUDICATION: ICD-10-CM

## 2023-08-21 DIAGNOSIS — M43.16 SPONDYLOLISTHESIS AT L4-L5 LEVEL: Primary | ICD-10-CM

## 2023-08-21 DIAGNOSIS — F17.200 SMOKING: ICD-10-CM

## 2023-08-21 PROCEDURE — 3075F SYST BP GE 130 - 139MM HG: CPT | Performed by: NEUROLOGICAL SURGERY

## 2023-08-21 PROCEDURE — 99214 OFFICE O/P EST MOD 30 MIN: CPT | Performed by: NEUROLOGICAL SURGERY

## 2023-08-21 PROCEDURE — 3078F DIAST BP <80 MM HG: CPT | Performed by: NEUROLOGICAL SURGERY

## 2023-08-21 NOTE — PROGRESS NOTES
[facility] NOTE:   History of Present Illness:    CC: Chronic low back and bilateral lower extremity pain    Estefania Oneill is a 59 y.o. female who presents today for evaluation of chronic low back and bilateral lower extremity pain. The patient states she has been experiencing low back and bilateral lower extremity pain for a few years time. She has undergone physical therapy and unable to complete months of physical therapy due to the excessive pain in her low back. She also endorses pain in her lower extremities and states that currently her pain is a 7 out of 10 on a visual analog pain scale but with walking to the parking lot her pain would be excruciating and a 10 out of 10 it would cause her to sit down. She works 10 hours a day standing on concrete as a  in an [address] [place] [address] setting. She uses Plavix and aspirin daily her last stents were placed greater than 2 years ago. Her cardiologist is Dr. Paris John with [facility] cardiology. She is an everyday less than half pack per day smoker and is working actively on smoking cessation knowing that this will need to take place for surgery. She has tried epidural steroid injections which have not relieved her pain and discomfort. The patient has an MRI lumbar spine performed without contrast at [address] [place] [address] on 7/29/2023 that demonstrates mild disc protrusions at L2-L3 and L3-L4 without evidence of significant central spinal stenosis at L3-L4 there is some facet hypertrophy at L4-L5 there is a grade 1 almost grade 2 spondylolisthesis no results in severe central and lateral recess spinal stenosis. There has been interval progression of the spondylolisthesis at L4-L5 in comparison to an MRI performed on 10/30/2021. At L5-S1 there is some mild degenerative disc disease and facet hypertrophy but no significant central canal stenosis and only mild right neuroforaminal stenosis.     Past

## 2023-08-22 ENCOUNTER — TELEPHONE (OUTPATIENT)
Age: 58
End: 2023-08-22

## 2023-08-22 NOTE — TELEPHONE ENCOUNTER
Cardiac Clearance        Physician or Practice RequestingDr LIV Winston  : Elie Franciscos Phone Number: [phone]  Fax Number: [phone]  Date of Surgery/Procedure: TBD   Type of Surgery or Procedure: L 4-5 posterior lumbar laminectomy Inter body fusion   Type of Anesthesia: general   Type of Clearance Requested: Cardiac Clearance and Medication Hold  Medication to Hold:Plavix   Days to Hold: 5 days prior

## 2023-08-23 ENCOUNTER — PREP FOR PROCEDURE (OUTPATIENT)
Dept: NEUROSURGERY | Age: 58
End: 2023-08-23

## 2023-08-23 DIAGNOSIS — F17.200 SMOKING: ICD-10-CM

## 2023-08-23 DIAGNOSIS — M48.062 LUMBAR STENOSIS WITH NEUROGENIC CLAUDICATION: ICD-10-CM

## 2023-08-23 PROBLEM — M54.16 LUMBAR RADICULOPATHY: Status: ACTIVE | Noted: 2023-08-23

## 2023-08-23 NOTE — TELEPHONE ENCOUNTER
Mady Mejia MD  You 1 hour ago (8:53 AM)     CS  Moderate cv risk- ok to hold plavix 5 days      Faxed a copy of this encounter to the requested number.

## 2023-09-22 ENCOUNTER — TELEPHONE (OUTPATIENT)
Dept: NEUROSURGERY | Age: 58
End: 2023-09-22

## 2023-09-22 NOTE — TELEPHONE ENCOUNTER
Patient needs to cancel surgery for 10/17. Please call her to discuss further. She'd like to reschedule surgery.

## 2023-09-26 ENCOUNTER — TELEPHONE (OUTPATIENT)
Dept: FAMILY MEDICINE CLINIC | Facility: CLINIC | Age: 58
End: 2023-09-26

## 2023-09-26 PROBLEM — M48.062 LUMBAR STENOSIS WITH NEUROGENIC CLAUDICATION: Status: ACTIVE | Noted: 2023-08-23

## 2023-09-26 PROBLEM — F17.200 SMOKING: Status: ACTIVE | Noted: 2023-08-23

## 2023-09-26 NOTE — TELEPHONE ENCOUNTER
Received a letter for the patient from [email or web address] requesting a response from provider stating that she takes [facility] for Type 2 Diabetes.  Letter written for the patient and left vm informing the patient his is upfront for

## 2023-09-29 ENCOUNTER — TELEPHONE (OUTPATIENT)
Dept: FAMILY MEDICINE CLINIC | Facility: CLINIC | Age: 58
End: 2023-09-29

## 2023-10-05 DIAGNOSIS — R10.13 ABDOMINAL DISCOMFORT, EPIGASTRIC: ICD-10-CM

## 2023-10-05 RX ORDER — PANTOPRAZOLE SODIUM 40 MG/1
40 TABLET, DELAYED RELEASE ORAL
Qty: 30 TABLET | Refills: 1 | Status: SHIPPED | OUTPATIENT
Start: 2023-10-05

## 2023-10-11 RX ORDER — CLOPIDOGREL BISULFATE 75 MG/1
75 TABLET ORAL DAILY
Qty: 60 TABLET | Refills: 3 | Status: CANCELLED | OUTPATIENT
Start: 2023-10-11

## 2023-10-11 RX ORDER — AMLODIPINE BESYLATE 5 MG/1
5 TABLET ORAL DAILY
Qty: 30 TABLET | Refills: 3 | Status: CANCELLED | OUTPATIENT
Start: 2023-10-11

## 2023-10-11 ASSESSMENT — ENCOUNTER SYMPTOMS
SHORTNESS OF BREATH: 0
ABDOMINAL PAIN: 0
BOWEL INCONTINENCE: 0
BLURRED VISION: 1
BACK PAIN: 1
ORTHOPNEA: 0

## 2023-10-12 ENCOUNTER — OFFICE VISIT (OUTPATIENT)
Dept: FAMILY MEDICINE CLINIC | Facility: CLINIC | Age: 58
End: 2023-10-12
Payer: COMMERCIAL

## 2023-10-12 ENCOUNTER — TELEPHONE (OUTPATIENT)
Dept: FAMILY MEDICINE CLINIC | Facility: CLINIC | Age: 58
End: 2023-10-12

## 2023-10-12 VITALS
RESPIRATION RATE: 16 BRPM | OXYGEN SATURATION: 100 % | TEMPERATURE: 98.2 F | HEIGHT: 67 IN | HEART RATE: 63 BPM | BODY MASS INDEX: 34.21 KG/M2 | WEIGHT: 218 LBS | DIASTOLIC BLOOD PRESSURE: 73 MMHG | SYSTOLIC BLOOD PRESSURE: 138 MMHG

## 2023-10-12 DIAGNOSIS — J44.1 CHRONIC OBSTRUCTIVE PULMONARY DISEASE WITH ACUTE EXACERBATION (HCC): ICD-10-CM

## 2023-10-12 DIAGNOSIS — Z79.899 ENCOUNTER FOR LONG-TERM (CURRENT) USE OF MEDICATIONS: ICD-10-CM

## 2023-10-12 DIAGNOSIS — R10.13 ABDOMINAL DISCOMFORT, EPIGASTRIC: ICD-10-CM

## 2023-10-12 DIAGNOSIS — I10 ESSENTIAL HYPERTENSION, BENIGN: ICD-10-CM

## 2023-10-12 DIAGNOSIS — E11.9 TYPE 2 DIABETES MELLITUS WITHOUT COMPLICATION, WITHOUT LONG-TERM CURRENT USE OF INSULIN (HCC): Primary | ICD-10-CM

## 2023-10-12 DIAGNOSIS — M62.838 MUSCLE SPASM OF RIGHT SHOULDER: ICD-10-CM

## 2023-10-12 DIAGNOSIS — I73.9 PAD (PERIPHERAL ARTERY DISEASE) (HCC): ICD-10-CM

## 2023-10-12 DIAGNOSIS — E78.2 MIXED HYPERLIPIDEMIA: ICD-10-CM

## 2023-10-12 DIAGNOSIS — E11.9 TYPE 2 DIABETES MELLITUS WITHOUT COMPLICATION, WITHOUT LONG-TERM CURRENT USE OF INSULIN (HCC): ICD-10-CM

## 2023-10-12 PROBLEM — K57.30 DIVERTICULOSIS OF LARGE INTESTINE WITHOUT PERFORATION OR ABSCESS WITHOUT BLEEDING: Status: ACTIVE | Noted: 2023-10-12

## 2023-10-12 PROBLEM — Z95.5 H/O HEART ARTERY STENT: Status: ACTIVE | Noted: 2023-10-12

## 2023-10-12 PROBLEM — K59.00 CONSTIPATION IN FEMALE: Status: ACTIVE | Noted: 2023-10-12

## 2023-10-12 LAB
ALBUMIN SERPL-MCNC: 3.6 G/DL (ref 3.2–4.6)
ALBUMIN/GLOB SERPL: 0.9 (ref 0.4–1.6)
ALP SERPL-CCNC: 101 U/L (ref 50–136)
ALT SERPL-CCNC: 50 U/L (ref 12–65)
ANION GAP SERPL CALC-SCNC: 6 MMOL/L (ref 2–11)
AST SERPL-CCNC: 27 U/L (ref 15–37)
BILIRUB SERPL-MCNC: 0.3 MG/DL (ref 0.2–1.1)
BUN SERPL-MCNC: 9 MG/DL (ref 8–23)
CALCIUM SERPL-MCNC: 9.3 MG/DL (ref 8.3–10.4)
CHLORIDE SERPL-SCNC: 111 MMOL/L (ref 101–110)
CHOLEST SERPL-MCNC: 107 MG/DL
CO2 SERPL-SCNC: 26 MMOL/L (ref 21–32)
CREAT SERPL-MCNC: 0.7 MG/DL (ref 0.6–1)
EST. AVERAGE GLUCOSE BLD GHB EST-MCNC: 120 MG/DL
GLOBULIN SER CALC-MCNC: 3.8 G/DL (ref 2.8–4.5)
GLUCOSE SERPL-MCNC: 110 MG/DL (ref 65–100)
HBA1C MFR BLD: 5.8 % (ref 4.8–5.6)
HDLC SERPL-MCNC: 40 MG/DL (ref 40–60)
HDLC SERPL: 2.7
LDLC SERPL CALC-MCNC: 34 MG/DL
MAGNESIUM SERPL-MCNC: 2.2 MG/DL (ref 1.8–2.4)
POTASSIUM SERPL-SCNC: 3.7 MMOL/L (ref 3.5–5.1)
PROT SERPL-MCNC: 7.4 G/DL (ref 6.3–8.2)
SODIUM SERPL-SCNC: 143 MMOL/L (ref 133–143)
TRIGL SERPL-MCNC: 165 MG/DL (ref 35–150)
VLDLC SERPL CALC-MCNC: 33 MG/DL (ref 6–23)

## 2023-10-12 PROCEDURE — 99214 OFFICE O/P EST MOD 30 MIN: CPT | Performed by: FAMILY MEDICINE

## 2023-10-12 PROCEDURE — 3078F DIAST BP <80 MM HG: CPT | Performed by: FAMILY MEDICINE

## 2023-10-12 PROCEDURE — 3075F SYST BP GE 130 - 139MM HG: CPT | Performed by: FAMILY MEDICINE

## 2023-10-12 PROCEDURE — 3044F HG A1C LEVEL LT 7.0%: CPT | Performed by: FAMILY MEDICINE

## 2023-10-12 RX ORDER — SUCRALFATE 1 G/1
1 TABLET ORAL 3 TIMES DAILY
COMMUNITY
Start: 2023-08-31

## 2023-10-12 RX ORDER — PANTOPRAZOLE SODIUM 40 MG/1
40 TABLET, DELAYED RELEASE ORAL
Qty: 30 TABLET | Refills: 5 | Status: SHIPPED | OUTPATIENT
Start: 2023-10-12

## 2023-10-12 RX ORDER — CYCLOBENZAPRINE HCL 5 MG
5 TABLET ORAL 3 TIMES DAILY PRN
Qty: 45 TABLET | Refills: 2 | Status: SHIPPED | OUTPATIENT
Start: 2023-10-12 | End: 2023-11-11

## 2023-10-12 RX ORDER — TIRZEPATIDE 2.5 MG/.5ML
INJECTION, SOLUTION SUBCUTANEOUS
Qty: 4 ADJUSTABLE DOSE PRE-FILLED PEN SYRINGE | Refills: 2 | Status: SHIPPED | OUTPATIENT
Start: 2023-10-12

## 2023-10-12 ASSESSMENT — PATIENT HEALTH QUESTIONNAIRE - PHQ9
2. FEELING DOWN, DEPRESSED OR HOPELESS: 0
3. TROUBLE FALLING OR STAYING ASLEEP: 0
SUM OF ALL RESPONSES TO PHQ QUESTIONS 1-9: 0
9. THOUGHTS THAT YOU WOULD BE BETTER OFF DEAD, OR OF HURTING YOURSELF: 0
SUM OF ALL RESPONSES TO PHQ9 QUESTIONS 1 & 2: 0
8. MOVING OR SPEAKING SO SLOWLY THAT OTHER PEOPLE COULD HAVE NOTICED. OR THE OPPOSITE, BEING SO FIGETY OR RESTLESS THAT YOU HAVE BEEN MOVING AROUND A LOT MORE THAN USUAL: 0
1. LITTLE INTEREST OR PLEASURE IN DOING THINGS: 0
SUM OF ALL RESPONSES TO PHQ QUESTIONS 1-9: 0
6. FEELING BAD ABOUT YOURSELF - OR THAT YOU ARE A FAILURE OR HAVE LET YOURSELF OR YOUR FAMILY DOWN: 0
10. IF YOU CHECKED OFF ANY PROBLEMS, HOW DIFFICULT HAVE THESE PROBLEMS MADE IT FOR YOU TO DO YOUR WORK, TAKE CARE OF THINGS AT HOME, OR GET ALONG WITH OTHER PEOPLE: 0
SUM OF ALL RESPONSES TO PHQ QUESTIONS 1-9: 0
7. TROUBLE CONCENTRATING ON THINGS, SUCH AS READING THE NEWSPAPER OR WATCHING TELEVISION: 0
5. POOR APPETITE OR OVEREATING: 0
SUM OF ALL RESPONSES TO PHQ QUESTIONS 1-9: 0
4. FEELING TIRED OR HAVING LITTLE ENERGY: 0

## 2023-10-12 NOTE — TELEPHONE ENCOUNTER
Submitted an additional PA per the patients request for [facility].    Contact plan to follow up on [id number]

## 2023-10-16 NOTE — RESULT ENCOUNTER NOTE
Your A1c is in the prediabetic range. That means your average blood sugar is elevated. That puts you at higher risk for heart disease. Your triglycerides are elevated. Triglycerides are the sugar part of your cholesterol. Make sure you are eating a heart healthy nutritious diet, getting regular aerobic physical activity, maintaining desired appropriate body weight, and avoiding tobacco products. Recheck lab work with an appointment in 3-6 months.   Other labs are normal.

## 2023-10-19 ENCOUNTER — TELEPHONE (OUTPATIENT)
Dept: NEUROSURGERY | Age: 58
End: 2023-10-19

## 2023-10-19 DIAGNOSIS — Z87.891 HISTORY OF SMOKING: Primary | ICD-10-CM

## 2023-12-01 ENCOUNTER — OFFICE VISIT (OUTPATIENT)
Dept: PULMONOLOGY | Age: 58
End: 2023-12-01

## 2023-12-01 VITALS
SYSTOLIC BLOOD PRESSURE: 132 MMHG | HEIGHT: 66 IN | DIASTOLIC BLOOD PRESSURE: 92 MMHG | HEART RATE: 80 BPM | RESPIRATION RATE: 20 BRPM | OXYGEN SATURATION: 94 % | TEMPERATURE: 98.1 F | BODY MASS INDEX: 35.68 KG/M2 | WEIGHT: 222 LBS

## 2023-12-01 DIAGNOSIS — F17.218 CIGARETTE NICOTINE DEPENDENCE WITH OTHER NICOTINE-INDUCED DISORDER: ICD-10-CM

## 2023-12-01 DIAGNOSIS — R91.8 LUNG NODULES: ICD-10-CM

## 2023-12-01 DIAGNOSIS — J43.9 PULMONARY EMPHYSEMA, UNSPECIFIED EMPHYSEMA TYPE (HCC): ICD-10-CM

## 2023-12-01 DIAGNOSIS — J44.9 COPD, MILD (HCC): Primary | ICD-10-CM

## 2023-12-01 RX ORDER — UMECLIDINIUM BROMIDE AND VILANTEROL TRIFENATATE 62.5; 25 UG/1; UG/1
POWDER RESPIRATORY (INHALATION)
Qty: 3 EACH | Refills: 3 | Status: SHIPPED | OUTPATIENT
Start: 2023-12-01

## 2023-12-01 RX ORDER — ALBUTEROL SULFATE 90 UG/1
AEROSOL, METERED RESPIRATORY (INHALATION)
Qty: 3 EACH | Refills: 3 | Status: SHIPPED | OUTPATIENT
Start: 2023-12-01

## 2023-12-01 ASSESSMENT — ENCOUNTER SYMPTOMS
SPUTUM PRODUCTION: 0
SHORTNESS OF BREATH: 0
HEMOPTYSIS: 0
WHEEZING: 0
COUGH: 1

## 2023-12-01 NOTE — PROGRESS NOTES
[address] [place] Kras[address] [facility], [address] [place] [address]  [phone]    Patient Name:  Estefania Oneill    YOB: 1965    Office Visit 12/1/2023      CHIEF COMPLAINT:      Chief Complaint   Patient presents with    COPD    Follow-up         ASSESSMENT:   (Medical Decision Making)                                                                                                                                          Encounter Diagnoses   Name Primary? COPD, mild ([address]) Yes    Pulmonary emphysema, unspecified emphysema type ([address])     Cigarette nicotine dependence with other nicotine-induced disorder     Lung nodules      Reports that she has been stable symptomatically until developed recent sinus congestion/drainage and nonproductive cough. She is compliant with maintenance inhaler. She has not required albuterol to any significant extent but has good response to it when she requires it. Tested negative for COVID. Symptom onset is over 1 week. She is using OTC DayQuil and NyQuil but is still having issues with nocturnal cough and difficulty sleeping. She is intolerant of hydrocodone or codeine. She continues to smoke but is smoking less than 1 pack/day. Last CT 12/2022 demonstrated stable groundglass nodular density in the LLL and scattered micronodules in the upper lobes. She is due for screening CT later this month. Order remains in effect. PLAN:     Continue Anoro 1 inhalation every morning. Albuterol 2 puffs 4 times daily if needed for shortness of breath or wheezing. OTC Flonase (generic fluticasone) nasal spray, 2 sprays each nostril daily until nasal congestion/drainage resolves. Continue OTC multisymptom cold/cough preparation during the day. Honey, whiskey, hot tea, lemon mixture at bedtime to suppress cough. Screening CT December 26th or that week, I will call her with results.     Radiology scheduling dept # is

## 2023-12-01 NOTE — PROGRESS NOTES
She is a pleasant 59 yr old female seen today due to COPD, shortness of breath,  tobacco use. Complete pulmonary function test demonstrated borderline restriction but this was not confirmed by lung volumes. There was mild reduction in diffusion capacity and no significant bronchodilator response. History also notable for COVID 1/2021 (had mild symptoms), CAD and stent. LDCT 12/23/2022-emphysematous changes. Groundglass nodular density in the LLL, 3 mm, unchanged. Scattered micronodules in the upper lobes, unchanged. DIAGNOSTICS:     Chest CT 8/22/2013-no pulmonary infiltrate or nodule identified. CXR 10/20/2020-lungs are expanded without evidence of acute cardiopulmonary process. Spirometry 10/2020-suggestive of mild restrictive defect, although technically limited. Ambulatory oximetry 10/20/2020-baseline saturation 98% room air at rest, 97% room air with ambulation. Baseline heart rate 73 and maximum 95 with ambulation. LDCT 10/28/2020 -mild upper lobe predominant emphysematous changes. No evidence of nodule. Minimal scarring and/or atelectasis. CPFT's 11/19/2020 - Borderline restriction not confirmed by lung volumes. Mild reduction in diffusion capacity. No bronchodilator response. Normal alpha-1 level/genotype 12/2020. PSG 12/21/2020-mild obstructive sleep apnea becoming moderate with REM sleep and associated with moderate hypoxia. Insomnia. Inadequate sleep hygiene. Sleep related movement disorder. Spirometry 5/28/2021-mild restrictive defect, no significant change. LDCT of chest 12/8/2021 clear lungs, no nodule or mass. Spirometry 11/9/2022-mild-moderate restrictive defect, trend toward worsening in FVC and FEV1. LDCT 12/23/2022-emphysematous changes. Groundglass nodular density in the LLL, 3 mm, unchanged. Scattered micronodules in the upper lobes, unchanged.

## 2023-12-01 NOTE — PATIENT INSTRUCTIONS
Continue Anoro 1 inhalation every morning. Albuterol 2 puffs 4 times daily if needed for shortness of breath or wheezing. OTC Flonase (generic fluticasone) nasal spray, 2 sprays each nostril daily until nasal congestion/drainage resolves. Continue OTC multisymptom cold/cough preparation during the day. Honey, whiskey, hot tea, lemon mixture at bedtime to suppress cough. Screening CT December 26th or that week, I will call her with results. Radiology scheduling dept # is provided if necessary to schedule chest CT ([phone])  Smoking cessation is encouraged. Advised to set quit date, decrease consumption toward quit date and use of OTC nicotine patch, lozenge or gum per package directions. She is commended in decreased tobacco consumption as far.

## 2023-12-26 ENCOUNTER — HOSPITAL ENCOUNTER (OUTPATIENT)
Dept: CT IMAGING | Age: 58
Discharge: HOME | End: 2023-12-29
Payer: COMMERCIAL

## 2023-12-26 DIAGNOSIS — F17.218 CIGARETTE NICOTINE DEPENDENCE WITH OTHER NICOTINE-INDUCED DISORDER: ICD-10-CM

## 2023-12-26 PROCEDURE — 71271 CT THORAX LUNG CANCER SCR C-: CPT

## 2024-01-04 ENCOUNTER — TELEPHONE (OUTPATIENT)
Dept: PULMONOLOGY | Age: 59
End: 2024-01-04

## 2024-01-04 DIAGNOSIS — R91.1 PULMONARY NODULE 1 CM OR GREATER IN DIAMETER: Primary | ICD-10-CM

## 2024-01-04 NOTE — TELEPHONE ENCOUNTER
LDCT performed 12/26/2023 demonstrates a new 2 cm juxtapleural nodule along the LEFT major fissure.  Report describes this along right major fissure.  Will request addendum to report.    Reviewed with Dr Hill, PET is advised.    CPFT's 11/19/2020 - Borderline restriction not confirmed by lung volumes. Mild reduction in diffusion capacity. No bronchodilator response.     Contacted patient, had to leave VM regarding recommendations for PET CT.  I have placed order and provided Radiology scheduling dept #  if necessary for her to schedule chest CT ([phone]).    I have asked her to contact me if she has any questions regarding CT findings and recommendations for PET.    2 things;    1 - Anne-Marie,     please ask radiology to issue addendum to CT with correct location of nodule in L lung, not R lung.    2 - Triage,    Please follow up w/ patient to ensure that she received my message and I will be happy to call her if she desires.    Thanks

## 2024-01-05 NOTE — TELEPHONE ENCOUNTER
Spoke with Corinne in File Rm, she will send message to [facility] to get Dr Oliveros to do addendum to report.

## 2024-01-12 ENCOUNTER — HOSPITAL ENCOUNTER (OUTPATIENT)
Dept: MAMMOGRAPHY | Age: 59
Discharge: HOME | End: 2024-01-12
Attending: FAMILY MEDICINE
Payer: COMMERCIAL

## 2024-01-12 PROCEDURE — 77067 SCR MAMMO BI INCL CAD: CPT

## 2024-01-15 RX ORDER — BACLOFEN 10 MG/1
TABLET ORAL
Qty: 40 TABLET | Refills: 5 | Status: CANCELLED | OUTPATIENT
Start: 2024-01-15

## 2024-01-16 ENCOUNTER — OFFICE VISIT (OUTPATIENT)
Age: 59
End: 2024-01-16
Payer: COMMERCIAL

## 2024-01-16 VITALS
DIASTOLIC BLOOD PRESSURE: 82 MMHG | HEIGHT: 67 IN | HEART RATE: 72 BPM | BODY MASS INDEX: 34.53 KG/M2 | SYSTOLIC BLOOD PRESSURE: 150 MMHG | WEIGHT: 220 LBS

## 2024-01-16 DIAGNOSIS — I10 ESSENTIAL HYPERTENSION, BENIGN: ICD-10-CM

## 2024-01-16 DIAGNOSIS — I25.10 CORONARY ARTERY DISEASE INVOLVING NATIVE CORONARY ARTERY OF NATIVE HEART WITHOUT ANGINA PECTORIS: Primary | ICD-10-CM

## 2024-01-16 DIAGNOSIS — E78.2 MIXED HYPERLIPIDEMIA: ICD-10-CM

## 2024-01-16 DIAGNOSIS — I73.9 PVD (PERIPHERAL VASCULAR DISEASE) (HCC): ICD-10-CM

## 2024-01-16 PROCEDURE — 3079F DIAST BP 80-89 MM HG: CPT | Performed by: INTERNAL MEDICINE

## 2024-01-16 PROCEDURE — 99214 OFFICE O/P EST MOD 30 MIN: CPT | Performed by: INTERNAL MEDICINE

## 2024-01-16 PROCEDURE — 3077F SYST BP >= 140 MM HG: CPT | Performed by: INTERNAL MEDICINE

## 2024-01-16 ASSESSMENT — ENCOUNTER SYMPTOMS
SHORTNESS OF BREATH: 0
BLURRED VISION: 1
ABDOMINAL PAIN: 0
ORTHOPNEA: 0
BOWEL INCONTINENCE: 0
BACK PAIN: 1

## 2024-01-16 NOTE — PROGRESS NOTES
[facility] CARDIOLOGY  [address] [place] [address], [facility] [address]  [place] [address]  PHONE: [phone]      24    NAME:  Charline Stinson  : 1965  MRN: [id number]       SUBJECTIVE:   Charline Stinson is a 58 y.o. female seen for a follow up visit regarding the following:     Chief Complaint   Patient presents with    Coronary Artery Disease         HPI:    No cp or wilson. No orthopnea or pnd. No palpitations or syncope.  No claudication.    Past Medical History, Past Surgical History, Family history, Social History, and Medications were all reviewed with the patient today and updated as necessary.     Current Outpatient Medications   Medication Sig Dispense Refill    albuterol sulfate HFA (PROVENTIL;VENTOLIN;PROAIR) 108 (90 Base) MCG/ACT inhaler 2 puffs 4 times daily if needed for shortness of breath or wheezing 3 each 3    umeclidinium-vilanterol (ANORO ELLIPTA) 62.5-25 MCG/ACT inhaler 1 inhalation every morning. 3 each 3    Tirzepatide (MOUNJARO) 2.5 MG/0.5ML SOPN SC injection Inject 0.5 mLs into the skin once a week for diabetes then increase to the 5 mg dose 4 Adjustable Dose Pre-filled Pen Syringe 2    Evolocumab 140 MG/ML SOAJ Inject 140 mg into the skin every 14 days 6 Adjustable Dose Pre-filled Pen Syringe 3    clopidogrel (PLAVIX) 75 MG tablet Take 1 tablet by mouth daily 60 tablet 3    umeclidinium-vilanterol (ANORO ELLIPTA) 62.5-25 MCG/INH AEPB inhaler 1 inhalation every morning, rinse mouth after use 3 each 3    aspirin 81 MG EC tablet Take 1 tablet by mouth daily      amLODIPine (NORVASC) 5 MG tablet Take 1 tablet by mouth daily (Patient not taking: Reported on 2024) 30 tablet 3     No current facility-administered medications for this visit.               Social History     Tobacco Use    Smoking status: Every Day     Current packs/day: 1.50     Average packs/day: 1.5 packs/day for 43.0 years (64.5 ttl pk-yrs)     Types: Cigarettes     Passive exposure: Current    Smokeless tobacco: Never

## 2024-01-17 ENCOUNTER — OFFICE VISIT (OUTPATIENT)
Dept: FAMILY MEDICINE CLINIC | Facility: CLINIC | Age: 59
End: 2024-01-17
Payer: COMMERCIAL

## 2024-01-17 VITALS
RESPIRATION RATE: 16 BRPM | HEIGHT: 67 IN | BODY MASS INDEX: 34.84 KG/M2 | DIASTOLIC BLOOD PRESSURE: 69 MMHG | SYSTOLIC BLOOD PRESSURE: 130 MMHG | HEART RATE: 70 BPM | OXYGEN SATURATION: 95 % | WEIGHT: 222 LBS | TEMPERATURE: 98.4 F

## 2024-01-17 DIAGNOSIS — I10 ESSENTIAL HYPERTENSION, BENIGN: ICD-10-CM

## 2024-01-17 DIAGNOSIS — Z72.0 TOBACCO ABUSE: ICD-10-CM

## 2024-01-17 DIAGNOSIS — J44.1 CHRONIC OBSTRUCTIVE PULMONARY DISEASE WITH ACUTE EXACERBATION (HCC): ICD-10-CM

## 2024-01-17 DIAGNOSIS — F39 MOOD DISORDER (HCC): ICD-10-CM

## 2024-01-17 DIAGNOSIS — E78.2 MIXED HYPERLIPIDEMIA: ICD-10-CM

## 2024-01-17 DIAGNOSIS — J01.90 ACUTE BACTERIAL SINUSITIS: ICD-10-CM

## 2024-01-17 DIAGNOSIS — E11.9 TYPE 2 DIABETES MELLITUS WITHOUT COMPLICATION, WITHOUT LONG-TERM CURRENT USE OF INSULIN (HCC): Primary | ICD-10-CM

## 2024-01-17 DIAGNOSIS — B96.89 ACUTE BACTERIAL SINUSITIS: ICD-10-CM

## 2024-01-17 DIAGNOSIS — E11.9 TYPE 2 DIABETES MELLITUS WITHOUT COMPLICATION, WITHOUT LONG-TERM CURRENT USE OF INSULIN (HCC): ICD-10-CM

## 2024-01-17 DIAGNOSIS — I73.9 PAD (PERIPHERAL ARTERY DISEASE) (HCC): ICD-10-CM

## 2024-01-17 DIAGNOSIS — S29.012A STRAIN OF THORACIC BACK REGION: ICD-10-CM

## 2024-01-17 LAB
ALBUMIN SERPL-MCNC: 3.6 G/DL (ref 3.2–4.6)
ALBUMIN/GLOB SERPL: 0.9 (ref 0.4–1.6)
ALP SERPL-CCNC: 94 U/L (ref 50–136)
ALT SERPL-CCNC: 54 U/L (ref 12–65)
ANION GAP SERPL CALC-SCNC: 3 MMOL/L (ref 2–11)
AST SERPL-CCNC: 35 U/L (ref 15–37)
BILIRUB SERPL-MCNC: 0.3 MG/DL (ref 0.2–1.1)
BUN SERPL-MCNC: 10 MG/DL (ref 8–23)
CALCIUM SERPL-MCNC: 9.2 MG/DL (ref 8.3–10.4)
CHLORIDE SERPL-SCNC: 109 MMOL/L (ref 103–113)
CHOLEST SERPL-MCNC: 250 MG/DL
CO2 SERPL-SCNC: 23 MMOL/L (ref 21–32)
CREAT SERPL-MCNC: 0.8 MG/DL (ref 0.6–1)
GLOBULIN SER CALC-MCNC: 3.9 G/DL (ref 2.8–4.5)
GLUCOSE SERPL-MCNC: 122 MG/DL (ref 65–100)
HDLC SERPL-MCNC: 42 MG/DL (ref 40–60)
HDLC SERPL: 6
LDLC SERPL CALC-MCNC: 168 MG/DL
POTASSIUM SERPL-SCNC: 3.9 MMOL/L (ref 3.5–5.1)
PROT SERPL-MCNC: 7.5 G/DL (ref 6.3–8.2)
SODIUM SERPL-SCNC: 135 MMOL/L (ref 136–146)
TRIGL SERPL-MCNC: 200 MG/DL (ref 35–150)
VLDLC SERPL CALC-MCNC: 40 MG/DL (ref 6–23)

## 2024-01-17 PROCEDURE — 99214 OFFICE O/P EST MOD 30 MIN: CPT | Performed by: FAMILY MEDICINE

## 2024-01-17 PROCEDURE — 3075F SYST BP GE 130 - 139MM HG: CPT | Performed by: FAMILY MEDICINE

## 2024-01-17 PROCEDURE — 3078F DIAST BP <80 MM HG: CPT | Performed by: FAMILY MEDICINE

## 2024-01-17 RX ORDER — GUAIFENESIN 600 MG/1
600 TABLET, EXTENDED RELEASE ORAL 2 TIMES DAILY
Qty: 30 TABLET | Refills: 0 | Status: SHIPPED | OUTPATIENT
Start: 2024-01-17 | End: 2024-02-01

## 2024-01-17 RX ORDER — CEPHALEXIN 500 MG/1
500 CAPSULE ORAL 3 TIMES DAILY
Qty: 30 CAPSULE | Refills: 0 | Status: SHIPPED | OUTPATIENT
Start: 2024-01-17 | End: 2024-01-27

## 2024-01-17 RX ORDER — TIRZEPATIDE 5 MG/.5ML
INJECTION, SOLUTION SUBCUTANEOUS
COMMUNITY
Start: 2024-01-13 | End: 2024-01-17 | Stop reason: SDUPTHER

## 2024-01-17 RX ORDER — BLOOD-GLUCOSE METER
EACH MISCELLANEOUS
Qty: 1 KIT | Refills: 0 | Status: SHIPPED | OUTPATIENT
Start: 2024-01-17

## 2024-01-17 RX ORDER — LANCETS 33 GAUGE
EACH MISCELLANEOUS
Qty: 100 EACH | Refills: 5 | Status: SHIPPED | OUTPATIENT
Start: 2024-01-17

## 2024-01-17 RX ORDER — VARENICLINE TARTRATE 0.5 MG/1
.5-1 TABLET, FILM COATED ORAL SEE ADMIN INSTRUCTIONS
Qty: 57 TABLET | Refills: 0 | Status: SHIPPED | OUTPATIENT
Start: 2024-01-17

## 2024-01-17 RX ORDER — VARENICLINE TARTRATE 1 MG/1
1 TABLET, FILM COATED ORAL 2 TIMES DAILY
Qty: 60 TABLET | Refills: 5 | Status: SHIPPED | OUTPATIENT
Start: 2024-01-17

## 2024-01-17 RX ORDER — TIRZEPATIDE 5 MG/.5ML
INJECTION, SOLUTION SUBCUTANEOUS
Qty: 4 ADJUSTABLE DOSE PRE-FILLED PEN SYRINGE | Refills: 5 | Status: SHIPPED | OUTPATIENT
Start: 2024-01-17

## 2024-01-17 RX ORDER — LANCING DEVICE/LANCETS
KIT MISCELLANEOUS
Qty: 1 EACH | Refills: 0 | Status: SHIPPED | OUTPATIENT
Start: 2024-01-17

## 2024-01-17 ASSESSMENT — ENCOUNTER SYMPTOMS
SORE THROAT: 1
WHEEZING: 0
COUGH: 1
VOMITING: 0
SWOLLEN GLANDS: 0
SINUS PAIN: 1
RHINORRHEA: 1
NAUSEA: 0

## 2024-01-17 ASSESSMENT — PATIENT HEALTH QUESTIONNAIRE - PHQ9
SUM OF ALL RESPONSES TO PHQ9 QUESTIONS 1 & 2: 0
SUM OF ALL RESPONSES TO PHQ QUESTIONS 1-9: 0
2. FEELING DOWN, DEPRESSED OR HOPELESS: 0
1. LITTLE INTEREST OR PLEASURE IN DOING THINGS: 0
SUM OF ALL RESPONSES TO PHQ QUESTIONS 1-9: 0

## 2024-01-17 NOTE — PROGRESS NOTES
Until we can get the 5 mg dose filled.           Key CAD CHF Meds            Evolocumab 140 MG/ML SOAJ (Taking)    Sig - Route: Inject 140 mg into the skin every 14 days - SubCUTAneous    amLODIPine (NORVASC) 5 MG tablet (Taking)    Sig - Route: Take 1 tablet by mouth daily - Oral           Lab Results   Component Value Date    LABA1C 5.8 (H) 10/12/2023     Lab Results   Component Value Date     10/12/2023      Lab Results   Component Value Date    CREATININE 0.70 10/12/2023      Lab Results   Component Value Date    MALBCR 12 07/12/2023      Wt Readings from Last 3 Encounters:   01/17/24 100.7 kg (222 lb)   01/16/24 99.8 kg (220 lb)   12/01/23 100.7 kg (222 lb)      BP Readings from Last 3 Encounters:   01/17/24 (!) 143/71   01/16/24 (!) 150/82   12/01/23 (!) 132/92        Hypertension  This is a chronic problem. The current episode started more than 1 month ago. The problem has been gradually improving since onset. The problem is controlled. Associated symptoms include anxiety, blurred vision and headaches. Pertinent negatives include no chest pain, malaise/fatigue, neck pain, orthopnea, palpitations, peripheral edema, PND, shortness of breath or sweats. There are no associated agents to hypertension. Risk factors for coronary artery disease include diabetes mellitus, dyslipidemia, post-menopausal state, obesity, smoking/tobacco exposure, sedentary lifestyle and stress. The current treatment provides significant improvement.   Back Pain  This is a recurrent problem. The current episode started more than 1 month ago. The problem has been gradually worsening since onset. The pain is present in the thoracic spine. The quality of the pain is described as aching. The pain does not radiate. The pain is at a severity of 9/10. The pain is moderate. The symptoms are aggravated by bending. Associated symptoms include headaches. Pertinent negatives include no abdominal pain, bladder incontinence, bowel incontinence,

## 2024-01-18 ENCOUNTER — HOSPITAL ENCOUNTER (OUTPATIENT)
Dept: PET IMAGING | Age: 59
Discharge: HOME | End: 2024-01-18
Payer: COMMERCIAL

## 2024-01-18 DIAGNOSIS — R91.1 PULMONARY NODULE 1 CM OR GREATER IN DIAMETER: ICD-10-CM

## 2024-01-18 LAB
EST. AVERAGE GLUCOSE BLD GHB EST-MCNC: 131 MG/DL
GLUCOSE BLD STRIP.AUTO-MCNC: 109 MG/DL (ref 65–100)
HBA1C MFR BLD: 6.2 % (ref 4.8–5.6)
SERVICE CMNT-IMP: ABNORMAL

## 2024-01-18 PROCEDURE — 78815 PET IMAGE W/CT SKULL-THIGH: CPT

## 2024-01-18 PROCEDURE — 2580000003 HC RX 258: Performed by: NURSE PRACTITIONER

## 2024-01-18 PROCEDURE — 6360000004 HC RX CONTRAST MEDICATION: Performed by: NURSE PRACTITIONER

## 2024-01-18 PROCEDURE — 82962 GLUCOSE BLOOD TEST: CPT

## 2024-01-18 PROCEDURE — A9609 HC RX DIAGNOSTIC RADIOPHARMACEUTICAL: HCPCS | Performed by: NURSE PRACTITIONER

## 2024-01-18 PROCEDURE — 3430000000 HC RX DIAGNOSTIC RADIOPHARMACEUTICAL: Performed by: NURSE PRACTITIONER

## 2024-01-18 RX ORDER — SODIUM CHLORIDE 0.9 % (FLUSH) 0.9 %
10 SYRINGE (ML) INJECTION ONCE AS NEEDED
Status: COMPLETED | OUTPATIENT
Start: 2024-01-18 | End: 2024-01-18

## 2024-01-18 RX ORDER — FLUDEOXYGLUCOSE F 18 200 MCI/ML
12.02 INJECTION, SOLUTION INTRAVENOUS
Status: COMPLETED | OUTPATIENT
Start: 2024-01-18 | End: 2024-01-18

## 2024-01-18 RX ADMIN — DIATRIZOATE MEGLUMINE AND DIATRIZOATE SODIUM 10 ML: 660; 100 LIQUID ORAL; RECTAL at 11:30

## 2024-01-18 RX ADMIN — FLUDEOXYGLUCOSE F 18 12.02 MILLICURIE: 200 INJECTION, SOLUTION INTRAVENOUS at 11:30

## 2024-01-18 RX ADMIN — SODIUM CHLORIDE, PRESERVATIVE FREE 10 ML: 5 INJECTION INTRAVENOUS at 11:30

## 2024-01-23 NOTE — RESULT ENCOUNTER NOTE
Your A1c, the 3 month blood sugar test, is in the prediabetic range. That means your average blood sugar is elevated.  That puts you at higher risk for heart disease.   Your cholesterol levels are elevated.  Make sure you are eating a heart healthy nutritious diet, getting regular aerobic physical activity, maintaining desired appropriate body weight and avoiding tobacco products.  Recheck lab work with an appointment in 3-6 months.  Other labs are normal.   You would benefit from a cholesterol medication.  That would help prevent heart attack and stroke.  I can send that to your pharmacy if interested.

## 2024-01-24 ENCOUNTER — TELEPHONE (OUTPATIENT)
Age: 59
End: 2024-01-24

## 2024-01-24 ENCOUNTER — TELEPHONE (OUTPATIENT)
Dept: PULMONOLOGY | Age: 59
End: 2024-01-24

## 2024-01-24 NOTE — TELEPHONE ENCOUNTER
LDCT performed 12/26/2023 demonstrated new 2 cm juxtapleural nodule along the left major fissure.    Subsequent PET/CT 1/18/2024 demonstrated near complete resolution of nodule on the left with no hypermetabolic activity.    However, there was rounded infraspinatus muscle on the right, appears to have been present since 8/22/2013 with maximum SUV of 4.5, may represent benign neoplastic process such as fibroma or elastofibroma dorsi.    I have reviewed available imaging studies 8/2013, CXR thru primary provider, chest CT 8/2013 and I find no mention of abnormality in this location on the studies.    __________________________________________________    Have reviewed images w/ Dr Kang, he advises review at tumor board.  I have contacted Ms Stinson and she is in agreement.  Discussed possibilities of consultation with oncology, ortho, IR for possible bx but will await group decision.    Will plan follow up LDCT 1 year from recent CT, I will order at her next appt.    __________________________________________________________    Nicolette,     Per Dr Kang's recommendation and patient's consent to review case at tumor board, could you place her on the schedule for next week.    If possible, can you see if you can locate any studies from 8/2013.    Thank you

## 2024-01-24 NOTE — TELEPHONE ENCOUNTER
Patient said he Repatha co-pay has went from $5.00 to $80.00. Patient wants to know is there a coupon or code she is suppose to use to get it back down to $5.00. Please call patient back to advise.

## 2024-02-01 ENCOUNTER — TELEPHONE (OUTPATIENT)
Dept: PULMONOLOGY | Age: 59
End: 2024-02-01

## 2024-02-01 DIAGNOSIS — R22.30 SHOULDER MASS: Primary | ICD-10-CM

## 2024-02-01 DIAGNOSIS — R22.31 ARM MASS, RIGHT: ICD-10-CM

## 2024-02-01 RX ORDER — DIAZEPAM 2 MG/1
2 TABLET ORAL EVERY 8 HOURS PRN
Qty: 2 TABLET | Refills: 0 | Status: SHIPPED | OUTPATIENT
Start: 2024-02-01 | End: 2024-02-11

## 2024-02-01 NOTE — TELEPHONE ENCOUNTER
Fielded call from pt who is agreeable to MRI but states that last time she had one she had to have a sedative. She is unsure of what she had... per medication list, it could have been diazepam. She states that it was two blue pills, she took one at her house and one when she got to the scan. Can we call her in something for this?    Pt given number to call radiology scheduling.

## 2024-02-01 NOTE — TELEPHONE ENCOUNTER
Pt discussed at Thoracic Conference yesterday 1/31/24 by Dr Kang. Decision of group is to have MRI of area in shoulder with and without contrast . If this area is concerning, pt needs to be referred to Dr Clements.    MRI ordered.     Unable to connect with pt. [facility] for return call.

## 2024-02-01 NOTE — TELEPHONE ENCOUNTER
Agree. I will place rx for two tablets of 2mg valium to her listed pharmacy. She should take one about 30 minutes prior to her mri and can bring the other with her should she need another.     Real Kang MD

## 2024-02-05 ENCOUNTER — TELEPHONE (OUTPATIENT)
Dept: PULMONOLOGY | Age: 59
End: 2024-02-05

## 2024-02-05 DIAGNOSIS — R22.30 SHOULDER MASS: ICD-10-CM

## 2024-02-05 RX ORDER — DIAZEPAM 2 MG/1
TABLET ORAL
Qty: 2 TABLET | Refills: 0 | Status: SHIPPED | OUTPATIENT
Start: 2024-02-05 | End: 2024-02-06

## 2024-02-09 ENCOUNTER — HOSPITAL ENCOUNTER (OUTPATIENT)
Dept: MRI IMAGING | Age: 59
Discharge: HOME | End: 2024-02-12

## 2024-02-09 DIAGNOSIS — R22.31 ARM MASS, RIGHT: ICD-10-CM

## 2024-02-09 DIAGNOSIS — R22.30 SHOULDER MASS: ICD-10-CM

## 2024-02-12 ENCOUNTER — TELEPHONE (OUTPATIENT)
Dept: PULMONOLOGY | Age: 59
End: 2024-02-12

## 2024-02-12 DIAGNOSIS — M75.111 INCOMPLETE TEAR OF RIGHT ROTATOR CUFF, UNSPECIFIED WHETHER TRAUMATIC: Primary | ICD-10-CM
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
## 2024-02-14 ENCOUNTER — OFFICE VISIT (OUTPATIENT)
Dept: FAMILY MEDICINE CLINIC | Facility: CLINIC | Age: 59
End: 2024-02-14
Payer: COMMERCIAL

## 2024-02-14 VITALS
SYSTOLIC BLOOD PRESSURE: 145 MMHG | OXYGEN SATURATION: 96 % | WEIGHT: 223 LBS | BODY MASS INDEX: 35 KG/M2 | DIASTOLIC BLOOD PRESSURE: 73 MMHG | TEMPERATURE: 98.4 F | HEIGHT: 67 IN | RESPIRATION RATE: 16 BRPM | HEART RATE: 82 BPM

## 2024-02-14 DIAGNOSIS — S46.011S SUPRASPINATUS TENDON RUPTURE, RIGHT, SEQUELA: ICD-10-CM

## 2024-02-14 DIAGNOSIS — M75.81: ICD-10-CM

## 2024-02-14 DIAGNOSIS — S70.12XA HEMATOMA OF LEFT THIGH, INITIAL ENCOUNTER: Primary | ICD-10-CM
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
## 2024-02-14 DIAGNOSIS — I25.10 CORONARY ARTERY DISEASE INVOLVING NATIVE CORONARY ARTERY OF NATIVE HEART WITHOUT ANGINA PECTORIS: ICD-10-CM

## 2024-02-14 PROCEDURE — 3077F SYST BP >= 140 MM HG: CPT | Performed by: FAMILY MEDICINE

## 2024-02-14 PROCEDURE — 99214 OFFICE O/P EST MOD 30 MIN: CPT | Performed by: FAMILY MEDICINE

## 2024-02-14 PROCEDURE — 3078F DIAST BP <80 MM HG: CPT | Performed by: FAMILY MEDICINE

## 2024-02-14 RX ORDER — CLOPIDOGREL BISULFATE 75 MG/1
75 TABLET ORAL DAILY
Qty: 60 TABLET | Refills: 5 | Status: SHIPPED | OUTPATIENT
Start: 2024-02-14

## 2024-02-14 RX ORDER — CLOPIDOGREL BISULFATE 75 MG/1
75 TABLET ORAL DAILY
Qty: 60 TABLET | Refills: 3 | Status: CANCELLED | OUTPATIENT
Start: 2024-02-14

## 2024-02-14 ASSESSMENT — PATIENT HEALTH QUESTIONNAIRE - PHQ9
SUM OF ALL RESPONSES TO PHQ9 QUESTIONS 1 & 2: 0
SUM OF ALL RESPONSES TO PHQ QUESTIONS 1-9: 0
2. FEELING DOWN, DEPRESSED OR HOPELESS: 0
SUM OF ALL RESPONSES TO PHQ QUESTIONS 1-9: 0
SUM OF ALL RESPONSES TO PHQ QUESTIONS 1-9: 0
1. LITTLE INTEREST OR PLEASURE IN DOING THINGS: 0
SUM OF ALL RESPONSES TO PHQ QUESTIONS 1-9: 0
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
## 2024-02-20 ENCOUNTER — TELEPHONE (OUTPATIENT)
Dept: ORTHOPEDIC SURGERY | Age: 59
End: 2024-02-20

## 2024-02-20 ASSESSMENT — ENCOUNTER SYMPTOMS
BOWEL INCONTINENCE: 0
ABDOMINAL PAIN: 0
VOMITING: 0
BACK PAIN: 1
SHORTNESS OF BREATH: 0
ORTHOPNEA: 0
DIARRHEA: 0
WHEEZING: 0
COUGH: 0
NAUSEA: 0
BLURRED VISION: 1
CONSTIPATION: 0
RHINORRHEA: 0

## 2024-02-21 ENCOUNTER — TELEPHONE (OUTPATIENT)
Dept: ORTHOPEDIC SURGERY | Age: 59
End: 2024-02-21

## 2024-03-04 ENCOUNTER — OFFICE VISIT (OUTPATIENT)
Dept: ORTHOPEDIC SURGERY | Age: 59
End: 2024-03-04
Payer: COMMERCIAL

## 2024-03-04 ENCOUNTER — TELEPHONE (OUTPATIENT)
Dept: ORTHOPEDIC SURGERY | Age: 59
End: 2024-03-04

## 2024-03-04 VITALS — HEIGHT: 67 IN | BODY MASS INDEX: 34.84 KG/M2 | WEIGHT: 222 LBS

## 2024-03-04 DIAGNOSIS — M25.511 RIGHT SHOULDER PAIN, UNSPECIFIED CHRONICITY: Primary | ICD-10-CM

## 2024-03-04 PROCEDURE — 99204 OFFICE O/P NEW MOD 45 MIN: CPT | Performed by: PHYSICIAN ASSISTANT

## 2024-03-04 NOTE — TELEPHONE ENCOUNTER
Patient is calling back from a missed call.  Patient states she received a message about coming in earlier today, but I don't see an encounter message or notes in the appointment notes with this documentation.   Patient then called back and stated the message said that Dawna Olmstead could see her today at 2:20 pm and she stated she can come in today at 2:20.
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
## 2024-03-14 ASSESSMENT — ENCOUNTER SYMPTOMS
BACK PAIN: 1
ORTHOPNEA: 0
WHEEZING: 0
VOMITING: 0
NAUSEA: 0
ABDOMINAL PAIN: 0
BOWEL INCONTINENCE: 0
RHINORRHEA: 0
SHORTNESS OF BREATH: 0
BLURRED VISION: 0
VISUAL CHANGE: 0
CONSTIPATION: 0
DIARRHEA: 0
COUGH: 0

## 2024-03-15 ENCOUNTER — OFFICE VISIT (OUTPATIENT)
Dept: FAMILY MEDICINE CLINIC | Facility: CLINIC | Age: 59
End: 2024-03-15
Payer: COMMERCIAL

## 2024-03-15 VITALS
TEMPERATURE: 98 F | BODY MASS INDEX: 34.06 KG/M2 | DIASTOLIC BLOOD PRESSURE: 75 MMHG | HEIGHT: 67 IN | RESPIRATION RATE: 16 BRPM | OXYGEN SATURATION: 98 % | WEIGHT: 217 LBS | SYSTOLIC BLOOD PRESSURE: 144 MMHG | HEART RATE: 55 BPM

## 2024-03-15 DIAGNOSIS — H57.11 EYE PAIN, RIGHT: ICD-10-CM

## 2024-03-15 DIAGNOSIS — R30.0 DYSURIA: Primary | ICD-10-CM

## 2024-03-15 DIAGNOSIS — M62.838 TRAPEZIUS MUSCLE SPASM: ICD-10-CM

## 2024-03-15 DIAGNOSIS — I10 ESSENTIAL HYPERTENSION, BENIGN: ICD-10-CM

## 2024-03-15 DIAGNOSIS — E78.2 MIXED HYPERLIPIDEMIA: ICD-10-CM

## 2024-03-15 DIAGNOSIS — E11.9 TYPE 2 DIABETES MELLITUS WITHOUT COMPLICATION, WITHOUT LONG-TERM CURRENT USE OF INSULIN (HCC): ICD-10-CM

## 2024-03-15 LAB
BILIRUBIN, URINE, POC: NEGATIVE
BLOOD URINE, POC: ABNORMAL
GLUCOSE URINE, POC: NEGATIVE
KETONES, URINE, POC: 500
LEUKOCYTE ESTERASE, URINE, POC: NEGATIVE
NITRITE, URINE, POC: NEGATIVE
PH, URINE, POC: 5 (ref 4.6–8)
PROTEIN,URINE, POC: ABNORMAL
SPECIFIC GRAVITY, URINE, POC: 1.02 (ref 1–1.03)
URINALYSIS CLARITY, POC: CLEAR
URINALYSIS COLOR, POC: ABNORMAL
UROBILINOGEN, POC: NORMAL

## 2024-03-15 PROCEDURE — 3077F SYST BP >= 140 MM HG: CPT | Performed by: FAMILY MEDICINE

## 2024-03-15 PROCEDURE — 81003 URINALYSIS AUTO W/O SCOPE: CPT | Performed by: FAMILY MEDICINE

## 2024-03-15 PROCEDURE — 3078F DIAST BP <80 MM HG: CPT | Performed by: FAMILY MEDICINE

## 2024-03-15 PROCEDURE — 3044F HG A1C LEVEL LT 7.0%: CPT | Performed by: FAMILY MEDICINE

## 2024-03-15 PROCEDURE — 99214 OFFICE O/P EST MOD 30 MIN: CPT | Performed by: FAMILY MEDICINE

## 2024-03-15 RX ORDER — CYCLOBENZAPRINE HCL 10 MG
10 TABLET ORAL 3 TIMES DAILY PRN
Qty: 60 TABLET | Refills: 2 | Status: SHIPPED | OUTPATIENT
Start: 2024-03-15 | End: 2024-04-04

## 2024-03-15 ASSESSMENT — ENCOUNTER SYMPTOMS
EYE ITCHING: 0
EYE PAIN: 1
PHOTOPHOBIA: 0
SORE THROAT: 0
EYE DISCHARGE: 0
EYE REDNESS: 0
FOREIGN BODY SENSATION: 0
TROUBLE SWALLOWING: 0
DOUBLE VISION: 0
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
## 2024-05-16 RX ORDER — VARENICLINE TARTRATE 1 MG/1
1 TABLET, FILM COATED ORAL 2 TIMES DAILY
Qty: 60 TABLET | Refills: 5 | Status: CANCELLED | OUTPATIENT
Start: 2024-05-16

## 2024-05-16 ASSESSMENT — ENCOUNTER SYMPTOMS
SHORTNESS OF BREATH: 0
ABDOMINAL PAIN: 0
BACK PAIN: 1
BOWEL INCONTINENCE: 0

## 2024-05-17 ENCOUNTER — OFFICE VISIT (OUTPATIENT)
Dept: FAMILY MEDICINE CLINIC | Facility: CLINIC | Age: 59
End: 2024-05-17
Payer: COMMERCIAL

## 2024-05-17 VITALS
OXYGEN SATURATION: 97 % | SYSTOLIC BLOOD PRESSURE: 136 MMHG | TEMPERATURE: 98.2 F | BODY MASS INDEX: 32.96 KG/M2 | DIASTOLIC BLOOD PRESSURE: 81 MMHG | HEART RATE: 64 BPM | HEIGHT: 67 IN | WEIGHT: 210 LBS | RESPIRATION RATE: 16 BRPM

## 2024-05-17 DIAGNOSIS — I10 ESSENTIAL HYPERTENSION, BENIGN: ICD-10-CM

## 2024-05-17 DIAGNOSIS — G47.33 OBSTRUCTIVE SLEEP APNEA (ADULT) (PEDIATRIC): ICD-10-CM

## 2024-05-17 DIAGNOSIS — E11.9 TYPE 2 DIABETES MELLITUS WITHOUT COMPLICATION, WITHOUT LONG-TERM CURRENT USE OF INSULIN (HCC): ICD-10-CM

## 2024-05-17 DIAGNOSIS — E78.2 MIXED HYPERLIPIDEMIA: ICD-10-CM

## 2024-05-17 DIAGNOSIS — I25.10 CORONARY ARTERY DISEASE INVOLVING NATIVE CORONARY ARTERY OF NATIVE HEART WITHOUT ANGINA PECTORIS: ICD-10-CM

## 2024-05-17 DIAGNOSIS — Z72.0 TOBACCO ABUSE: ICD-10-CM

## 2024-05-17 DIAGNOSIS — F51.01 PRIMARY INSOMNIA: ICD-10-CM

## 2024-05-17 DIAGNOSIS — E11.9 TYPE 2 DIABETES MELLITUS WITHOUT COMPLICATION, WITHOUT LONG-TERM CURRENT USE OF INSULIN (HCC): Primary | ICD-10-CM

## 2024-05-17 LAB
ANION GAP SERPL CALC-SCNC: 12 MMOL/L (ref 9–18)
BUN SERPL-MCNC: 11 MG/DL (ref 8–23)
CALCIUM SERPL-MCNC: 9.3 MG/DL (ref 8.8–10.2)
CHLORIDE SERPL-SCNC: 105 MMOL/L (ref 98–107)
CHOLEST SERPL-MCNC: 120 MG/DL (ref 0–200)
CO2 SERPL-SCNC: 24 MMOL/L (ref 20–28)
CREAT SERPL-MCNC: 0.61 MG/DL (ref 0.6–1.1)
EST. AVERAGE GLUCOSE BLD GHB EST-MCNC: 123 MG/DL
GLUCOSE SERPL-MCNC: 99 MG/DL (ref 70–99)
HBA1C MFR BLD: 5.9 % (ref 0–5.6)
HDLC SERPL-MCNC: 40 MG/DL (ref 40–60)
HDLC SERPL: 3 (ref 0–5)
LDLC SERPL CALC-MCNC: 47 MG/DL (ref 0–100)
POTASSIUM SERPL-SCNC: 4.1 MMOL/L (ref 3.5–5.1)
SODIUM SERPL-SCNC: 141 MMOL/L (ref 136–145)
TRIGL SERPL-MCNC: 164 MG/DL (ref 0–150)
VLDLC SERPL CALC-MCNC: 33 MG/DL (ref 6–23)

## 2024-05-17 PROCEDURE — 3044F HG A1C LEVEL LT 7.0%: CPT | Performed by: FAMILY MEDICINE

## 2024-05-17 PROCEDURE — 3077F SYST BP >= 140 MM HG: CPT | Performed by: FAMILY MEDICINE

## 2024-05-17 PROCEDURE — 99214 OFFICE O/P EST MOD 30 MIN: CPT | Performed by: FAMILY MEDICINE

## 2024-05-17 PROCEDURE — 3078F DIAST BP <80 MM HG: CPT | Performed by: FAMILY MEDICINE

## 2024-05-17 RX ORDER — TRAZODONE HYDROCHLORIDE 50 MG/1
TABLET ORAL
Qty: 90 TABLET | Refills: 2 | Status: SHIPPED | OUTPATIENT
Start: 2024-05-17

## 2024-05-17 RX ORDER — TIRZEPATIDE 5 MG/.5ML
INJECTION, SOLUTION SUBCUTANEOUS
Qty: 4 ADJUSTABLE DOSE PRE-FILLED PEN SYRINGE | Refills: 5 | Status: SHIPPED | OUTPATIENT
Start: 2024-05-17

## 2024-05-17 RX ORDER — AMLODIPINE BESYLATE 5 MG/1
5 TABLET ORAL DAILY
Qty: 30 TABLET | Refills: 5 | Status: SHIPPED | OUTPATIENT
Start: 2024-05-17

## 2024-05-17 ASSESSMENT — PATIENT HEALTH QUESTIONNAIRE - PHQ9
SUM OF ALL RESPONSES TO PHQ9 QUESTIONS 1 & 2: 3
6. FEELING BAD ABOUT YOURSELF - OR THAT YOU ARE A FAILURE OR HAVE LET YOURSELF OR YOUR FAMILY DOWN: NOT AT ALL
9. THOUGHTS THAT YOU WOULD BE BETTER OFF DEAD, OR OF HURTING YOURSELF: NOT AT ALL
4. FEELING TIRED OR HAVING LITTLE ENERGY: NEARLY EVERY DAY
7. TROUBLE CONCENTRATING ON THINGS, SUCH AS READING THE NEWSPAPER OR WATCHING TELEVISION: NEARLY EVERY DAY
1. LITTLE INTEREST OR PLEASURE IN DOING THINGS: NEARLY EVERY DAY
8. MOVING OR SPEAKING SO SLOWLY THAT OTHER PEOPLE COULD HAVE NOTICED. OR THE OPPOSITE, BEING SO FIGETY OR RESTLESS THAT YOU HAVE BEEN MOVING AROUND A LOT MORE THAN USUAL: NOT AT ALL
SUM OF ALL RESPONSES TO PHQ QUESTIONS 1-9: 13
10. IF YOU CHECKED OFF ANY PROBLEMS, HOW DIFFICULT HAVE THESE PROBLEMS MADE IT FOR YOU TO DO YOUR WORK, TAKE CARE OF THINGS AT HOME, OR GET ALONG WITH OTHER PEOPLE: SOMEWHAT DIFFICULT
5. POOR APPETITE OR OVEREATING: NEARLY EVERY DAY
SUM OF ALL RESPONSES TO PHQ QUESTIONS 1-9: 13
3. TROUBLE FALLING OR STAYING ASLEEP: SEVERAL DAYS
2. FEELING DOWN, DEPRESSED OR HOPELESS: NOT AT ALL

## 2024-05-17 ASSESSMENT — ENCOUNTER SYMPTOMS: BLURRED VISION: 0

## 2024-05-17 NOTE — PROGRESS NOTES
HISTORY OF PRESENT ILLNESS  Chief Complaint   Patient presents with    Laryngitis    Depression     Phq9-13 ; pt states she does not want antidepressants          NOTICE FOR THE PATIENT: This clinical note is not designed to be interpreted by patients.  We do not recommend reading it unless you have medical training. These notes may contain candid and (unintentionally) offensive descriptions, which are sometimes required for accurate documentation. If you would like more information about your healthcare, please obtain it directly by myself or my staff/colleagues - never solely from the notes. Thank you for your understanding and cooperation.     Phq9-13 ; pt states she does not want antidepressants  Hasn't been asleep since she got off at 2:30 am.  Usually around 6-7 am will go to bed and off and up 12-1 am  Usually once she lies down she is out.  Will often wake up 30 min later and other times will sleep through her alarm.  Work is aggravating and all kinds of new people and people are quitting.    Hasn't had the mounjaro in 2 months due to shortages.    Dies have sleep apnea but tends to pull it off in the night.  Goes back to sleep next month.    Is back on her repatha now.  Was off of it at the time of her last labs    Diabetes Mellitus: Patient presents for follow up of diabetes. Symptoms: taking medications as instructed, no medication side effects noted, no TIA's, no chest pain on exertion, no dyspnea on exertion, no swelling of ankles. Symptoms have reasonably well controlled.    Patient denies chest pain.  She states she is compliant most of the time with her  medications. She  states she is compliant most of the time with her  diet.  Evaluation to date has been included below.   Home sugars: BGs consistently in an acceptable range. Treatment to date: medications.  Diabetic issues reviewed with her: low cholesterol diet, weight control and daily exercise discussed.  Diabetic Medications

## 2024-05-21 NOTE — RESULT ENCOUNTER NOTE
Your A1c is in the prediabetic range. That means your average blood sugar is elevated.  That puts you at higher risk for heart disease.  Your triglycerides are elevated.  Triglycerides are the sugar part of your cholesterol.  Make sure you are eating a heart healthy nutritious diet, getting regular aerobic physical activity, maintaining desired appropriate body weight, and avoiding tobacco products.  Continue your current medications.  Recheck labs with an appointment in 6-12 months. Other labs are normal.

## 2024-06-04 ENCOUNTER — TELEPHONE (OUTPATIENT)
Dept: FAMILY MEDICINE CLINIC | Facility: CLINIC | Age: 59
End: 2024-06-04

## 2024-06-04 ENCOUNTER — TELEPHONE (OUTPATIENT)
Age: 59
End: 2024-06-04

## 2024-06-04 NOTE — TELEPHONE ENCOUNTER
Called patient to inform co-pay card will be available at the [place] office for . She voiced understanding.

## 2024-06-04 NOTE — TELEPHONE ENCOUNTER
Pt went to  her RX Repatha and was told her coupon code does not work anymore. Pt wants to know if she can pick one up at the [place] office. Pt would appreciate a call back

## 2024-06-05 NOTE — TELEPHONE ENCOUNTER
Called and spoke to pt, she did see her results in MyChart.  Pt did not have any questions or comments at this time, but will call if she does think of anything.

## 2024-06-19 ENCOUNTER — OFFICE VISIT (OUTPATIENT)
Dept: FAMILY MEDICINE CLINIC | Facility: CLINIC | Age: 59
End: 2024-06-19
Payer: COMMERCIAL

## 2024-06-19 VITALS
OXYGEN SATURATION: 98 % | RESPIRATION RATE: 16 BRPM | SYSTOLIC BLOOD PRESSURE: 148 MMHG | DIASTOLIC BLOOD PRESSURE: 81 MMHG | BODY MASS INDEX: 32.8 KG/M2 | TEMPERATURE: 98.1 F | WEIGHT: 209 LBS | HEART RATE: 64 BPM | HEIGHT: 67 IN

## 2024-06-19 DIAGNOSIS — M48.02 SPINAL STENOSIS IN CERVICAL REGION: ICD-10-CM

## 2024-06-19 DIAGNOSIS — I10 ESSENTIAL HYPERTENSION, BENIGN: ICD-10-CM

## 2024-06-19 DIAGNOSIS — S89.91XA RIGHT KNEE INJURY, INITIAL ENCOUNTER: Primary | ICD-10-CM

## 2024-06-19 DIAGNOSIS — I25.10 CORONARY ARTERY DISEASE INVOLVING NATIVE CORONARY ARTERY OF NATIVE HEART WITHOUT ANGINA PECTORIS: ICD-10-CM

## 2024-06-19 DIAGNOSIS — Z72.0 TOBACCO ABUSE: ICD-10-CM

## 2024-06-19 DIAGNOSIS — E11.9 TYPE 2 DIABETES MELLITUS WITHOUT COMPLICATION, WITHOUT LONG-TERM CURRENT USE OF INSULIN (HCC): ICD-10-CM

## 2024-06-19 DIAGNOSIS — M48.062 SPINAL STENOSIS, LUMBAR REGION WITH NEUROGENIC CLAUDICATION: ICD-10-CM

## 2024-06-19 PROCEDURE — 99214 OFFICE O/P EST MOD 30 MIN: CPT | Performed by: FAMILY MEDICINE

## 2024-06-19 PROCEDURE — 3078F DIAST BP <80 MM HG: CPT | Performed by: FAMILY MEDICINE

## 2024-06-19 PROCEDURE — 3077F SYST BP >= 140 MM HG: CPT | Performed by: FAMILY MEDICINE

## 2024-06-19 PROCEDURE — 3044F HG A1C LEVEL LT 7.0%: CPT | Performed by: FAMILY MEDICINE

## 2024-06-19 RX ORDER — NALOXONE HYDROCHLORIDE 4 MG/.1ML
4 SPRAY NASAL
COMMUNITY
Start: 2024-05-26

## 2024-06-19 RX ORDER — HYDROCODONE BITARTRATE AND ACETAMINOPHEN 5; 325 MG/1; MG/1
1 TABLET ORAL EVERY 6 HOURS PRN
COMMUNITY
Start: 2024-05-26

## 2024-06-19 NOTE — PROGRESS NOTES
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    FMLA     Pt has FMLA paperwork she needs to get filled out     NOTICE FOR THE PATIENT: This clinical note is not designed to be interpreted by patients.  We do not recommend reading it unless you have medical training. These notes may contain candid and (unintentionally) offensive descriptions, which are sometimes required for accurate documentation. If you would like more information about your healthcare, please obtain it directly by myself or my staff/colleagues - never solely from the notes. Thank you for your understanding and cooperation.     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  She stepped up on the curb.  Hurts behind the knee and down the front of the leg.  Has been trying to work. 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  They thought she may have injured the menicus.  Pt has FMLA paperwork she needs to get filled out    DOI 05/26/24    Previously said, \" [facility] 05/26/2024  f right knee pain. Injury occurred yesterday. States she was at a gas station when she stepped up on a curb wrong and felt a pop in her right knee. Reports diffuse knee pain radiating into her right calf. No reported numbness tingling or weakness in her lower extremities. She is able to bear weight on her right knee but with increased pain and difficulty. Reports full range of motion of her right knee. No reported systemic symptoms. No skin color changes.  Right knee: Effusion present. No swelling, deformity, erythema, ecchymosis, lacerations, bony tenderness or crepitus. Normal range of motion. Tenderness present. No LCL laxity, MCL laxity, ACL laxity or PCL

## 2024-06-22 DIAGNOSIS — E11.9 TYPE 2 DIABETES MELLITUS WITHOUT COMPLICATION, WITHOUT LONG-TERM CURRENT USE OF INSULIN (HCC): ICD-10-CM

## 2024-06-24 RX ORDER — TIRZEPATIDE 5 MG/.5ML
INJECTION, SOLUTION SUBCUTANEOUS
Qty: 6 ML | OUTPATIENT
Start: 2024-06-24

## 2024-06-26 ASSESSMENT — ENCOUNTER SYMPTOMS
BACK PAIN: 1
BLURRED VISION: 0
ORTHOPNEA: 0
BOWEL INCONTINENCE: 0
ABDOMINAL PAIN: 0
SHORTNESS OF BREATH: 0

## 2024-07-03 ENCOUNTER — OFFICE VISIT (OUTPATIENT)
Age: 59
End: 2024-07-03
Payer: COMMERCIAL

## 2024-07-03 VITALS
BODY MASS INDEX: 32.44 KG/M2 | HEIGHT: 67 IN | WEIGHT: 206.7 LBS | DIASTOLIC BLOOD PRESSURE: 86 MMHG | SYSTOLIC BLOOD PRESSURE: 150 MMHG | HEART RATE: 68 BPM

## 2024-07-03 DIAGNOSIS — I25.10 CORONARY ARTERY DISEASE INVOLVING NATIVE CORONARY ARTERY OF NATIVE HEART WITHOUT ANGINA PECTORIS: Primary | ICD-10-CM

## 2024-07-03 DIAGNOSIS — E78.2 MIXED HYPERLIPIDEMIA: ICD-10-CM

## 2024-07-03 DIAGNOSIS — I10 ESSENTIAL HYPERTENSION, BENIGN: ICD-10-CM

## 2024-07-03 PROCEDURE — 3078F DIAST BP <80 MM HG: CPT | Performed by: INTERNAL MEDICINE

## 2024-07-03 PROCEDURE — 3077F SYST BP >= 140 MM HG: CPT | Performed by: INTERNAL MEDICINE

## 2024-07-03 PROCEDURE — 99214 OFFICE O/P EST MOD 30 MIN: CPT | Performed by: INTERNAL MEDICINE

## 2024-07-03 PROCEDURE — 93000 ELECTROCARDIOGRAM COMPLETE: CPT | Performed by: INTERNAL MEDICINE

## 2024-07-03 NOTE — PROGRESS NOTES
[facility] CARDIOLOGY  [address] [place] [address], [address]  [place] [address]  PHONE: [phone]      24    NAME:  Charline Stinson  : 1965  MRN: [id number]       SUBJECTIVE:   Charline Stinson is a 58 y.o. female seen for a follow up visit regarding the following:     Chief Complaint   Patient presents with    Coronary Artery Disease         HPI:    No cp or wilson. No orthopnea or pnd. No palpitations or syncope.      Past Medical History, Past Surgical History, Family history, Social History, and Medications were all reviewed with the patient today and updated as necessary.     Current Outpatient Medications   Medication Sig Dispense Refill    amLODIPine (NORVASC) 5 MG tablet Take 1 tablet by mouth daily 30 tablet 5    Tirzepatide (MOUNJARO) 5 MG/0.5ML SOPN SC injection INJECT 5 MG UNDER THE SKIN ONCE A WEEK AFTER FINISHING 2.5MG DOSE 4 Adjustable Dose Pre-filled Pen Syringe 5    traZODone (DESYREL) 50 MG tablet Take 1-2 po before bed for sleep 90 tablet 2    clopidogrel (PLAVIX) 75 MG tablet Take 1 tablet by mouth daily 60 tablet 5    Blood Glucose Monitoring Suppl (ONE TOUCH ULTRA 2) w/Device KIT Test blood sugar daily, either fasting or 2 hours after eating for diabetes and record in log. (#1)  Dx: E11.9 1 kit 0    Lancets (ONETOUCH DELICA PLUS [id number]) MISC Test blood sugar daily, either fasting or 2 hours after eating for diabetes and record in log. (#100)  Dx: E11.9 100 each 5    Lancet Devices (ONE TOUCH DELICA LANCING DEV) MISC Test blood sugar daily, either fasting or 2 hours after eating for diabetes and record in log. (#1)  Dx: E11.9 1 each 0    blood glucose test strips (ASCENSIA AUTODISC VI;ONE TOUCH ULTRA TEST VI) strip Test blood sugar daily, either fasting or 2 hours after eating for diabetes and record in log. (#100)  Dx: E11.9 100 each 3    albuterol sulfate HFA (PROVENTIL;VENTOLIN;PROAIR) 108 (90 Base) MCG/ACT inhaler 2 puffs 4 times daily if needed for shortness of breath or

## 2024-08-11 DIAGNOSIS — E11.9 TYPE 2 DIABETES MELLITUS WITHOUT COMPLICATION, WITHOUT LONG-TERM CURRENT USE OF INSULIN (HCC): ICD-10-CM

## 2024-08-12 RX ORDER — TIRZEPATIDE 5 MG/.5ML
INJECTION, SOLUTION SUBCUTANEOUS
Qty: 6 ML | OUTPATIENT
Start: 2024-08-12

## 2024-10-18 ENCOUNTER — OFFICE VISIT (OUTPATIENT)
Dept: FAMILY MEDICINE CLINIC | Facility: CLINIC | Age: 59
End: 2024-10-18

## 2024-10-18 VITALS
BODY MASS INDEX: 32.18 KG/M2 | RESPIRATION RATE: 16 BRPM | WEIGHT: 205 LBS | OXYGEN SATURATION: 96 % | HEIGHT: 67 IN | TEMPERATURE: 97.4 F | DIASTOLIC BLOOD PRESSURE: 83 MMHG | HEART RATE: 60 BPM | SYSTOLIC BLOOD PRESSURE: 180 MMHG

## 2024-10-18 DIAGNOSIS — E78.2 MIXED HYPERLIPIDEMIA: ICD-10-CM

## 2024-10-18 DIAGNOSIS — F51.01 PRIMARY INSOMNIA: ICD-10-CM

## 2024-10-18 DIAGNOSIS — I10 ESSENTIAL HYPERTENSION, BENIGN: Primary | ICD-10-CM

## 2024-10-18 DIAGNOSIS — Z28.21 REFUSED INFLUENZA VACCINE: ICD-10-CM

## 2024-10-18 DIAGNOSIS — I25.10 CORONARY ARTERY DISEASE INVOLVING NATIVE CORONARY ARTERY OF NATIVE HEART WITHOUT ANGINA PECTORIS: ICD-10-CM

## 2024-10-18 DIAGNOSIS — F17.200 SMOKING: ICD-10-CM

## 2024-10-18 DIAGNOSIS — I10 ESSENTIAL HYPERTENSION, BENIGN: ICD-10-CM

## 2024-10-18 DIAGNOSIS — E11.9 TYPE 2 DIABETES MELLITUS WITHOUT COMPLICATION, WITHOUT LONG-TERM CURRENT USE OF INSULIN (HCC): ICD-10-CM

## 2024-10-18 DIAGNOSIS — Z72.0 TOBACCO ABUSE: ICD-10-CM

## 2024-10-18 LAB
ALBUMIN SERPL-MCNC: 3.5 G/DL (ref 3.2–4.6)
ALBUMIN/GLOB SERPL: 1 (ref 1–1.9)
ALP SERPL-CCNC: 105 U/L (ref 35–104)
ALT SERPL-CCNC: 15 U/L (ref 8–45)
ANION GAP SERPL CALC-SCNC: 8 MMOL/L (ref 9–18)
AST SERPL-CCNC: 20 U/L (ref 15–37)
BASOPHILS # BLD: 0.1 K/UL (ref 0–0.2)
BASOPHILS NFR BLD: 1 % (ref 0–2)
BILIRUB SERPL-MCNC: 0.2 MG/DL (ref 0–1.2)
BUN SERPL-MCNC: 12 MG/DL (ref 8–23)
CALCIUM SERPL-MCNC: 9.2 MG/DL (ref 8.8–10.2)
CHLORIDE SERPL-SCNC: 105 MMOL/L (ref 98–107)
CHOLEST SERPL-MCNC: 233 MG/DL (ref 0–200)
CO2 SERPL-SCNC: 27 MMOL/L (ref 20–28)
CREAT SERPL-MCNC: 0.63 MG/DL (ref 0.6–1.1)
DIFFERENTIAL METHOD BLD: ABNORMAL
EOSINOPHIL # BLD: 0.2 K/UL (ref 0–0.8)
EOSINOPHIL NFR BLD: 2 % (ref 0.5–7.8)
ERYTHROCYTE [DISTWIDTH] IN BLOOD BY AUTOMATED COUNT: 14.3 % (ref 11.9–14.6)
EST. AVERAGE GLUCOSE BLD GHB EST-MCNC: 128 MG/DL
GLOBULIN SER CALC-MCNC: 3.5 G/DL (ref 2.3–3.5)
GLUCOSE SERPL-MCNC: 111 MG/DL (ref 70–99)
HBA1C MFR BLD: 6.1 % (ref 0–5.6)
HCT VFR BLD AUTO: 45.2 % (ref 35.8–46.3)
HDLC SERPL-MCNC: 40 MG/DL (ref 40–60)
HDLC SERPL: 5.8 (ref 0–5)
HGB BLD-MCNC: 14.3 G/DL (ref 11.7–15.4)
IMM GRANULOCYTES # BLD AUTO: 0 K/UL (ref 0–0.5)
IMM GRANULOCYTES NFR BLD AUTO: 0 % (ref 0–5)
LDLC SERPL CALC-MCNC: 165 MG/DL (ref 0–100)
LYMPHOCYTES # BLD: 2.2 K/UL (ref 0.5–4.6)
LYMPHOCYTES NFR BLD: 31 % (ref 13–44)
MCH RBC QN AUTO: 27.2 PG (ref 26.1–32.9)
MCHC RBC AUTO-ENTMCNC: 31.6 G/DL (ref 31.4–35)
MCV RBC AUTO: 86.1 FL (ref 82–102)
MONOCYTES # BLD: 0.5 K/UL (ref 0.1–1.3)
MONOCYTES NFR BLD: 7 % (ref 4–12)
NEUTS SEG # BLD: 4 K/UL (ref 1.7–8.2)
NEUTS SEG NFR BLD: 59 % (ref 43–78)
NRBC # BLD: 0 K/UL (ref 0–0.2)
PLATELET # BLD AUTO: 269 K/UL (ref 150–450)
PMV BLD AUTO: 10.2 FL (ref 9.4–12.3)
POTASSIUM SERPL-SCNC: 3.8 MMOL/L (ref 3.5–5.1)
PROT SERPL-MCNC: 7.1 G/DL (ref 6.3–8.2)
RBC # BLD AUTO: 5.25 M/UL (ref 4.05–5.2)
SODIUM SERPL-SCNC: 140 MMOL/L (ref 136–145)
TRIGL SERPL-MCNC: 138 MG/DL (ref 0–150)
VLDLC SERPL CALC-MCNC: 28 MG/DL (ref 6–23)
WBC # BLD AUTO: 6.9 K/UL (ref 4.3–11.1)

## 2024-10-18 RX ORDER — TIRZEPATIDE 7.5 MG/.5ML
7.5 INJECTION, SOLUTION SUBCUTANEOUS WEEKLY
Qty: 12 ADJUSTABLE DOSE PRE-FILLED PEN SYRINGE | Refills: 1 | Status: SHIPPED | OUTPATIENT
Start: 2024-10-18

## 2024-10-18 RX ORDER — AMLODIPINE AND OLMESARTAN MEDOXOMIL 5; 20 MG/1; MG/1
TABLET ORAL
Qty: 90 TABLET | Refills: 1 | Status: SHIPPED | OUTPATIENT
Start: 2024-10-18

## 2024-10-18 RX ORDER — TIRZEPATIDE 5 MG/.5ML
INJECTION, SOLUTION SUBCUTANEOUS
Qty: 4 ADJUSTABLE DOSE PRE-FILLED PEN SYRINGE | Refills: 5 | Status: CANCELLED | OUTPATIENT
Start: 2024-10-18

## 2024-10-18 RX ORDER — TRAZODONE HYDROCHLORIDE 50 MG/1
TABLET, FILM COATED ORAL
Qty: 90 TABLET | Refills: 2 | Status: SHIPPED | OUTPATIENT
Start: 2024-10-18

## 2024-10-18 RX ORDER — AMLODIPINE BESYLATE 5 MG/1
5 TABLET ORAL DAILY
Qty: 30 TABLET | Refills: 5 | Status: CANCELLED | OUTPATIENT
Start: 2024-10-18

## 2024-10-18 RX ORDER — CLOPIDOGREL BISULFATE 75 MG/1
75 TABLET ORAL DAILY
Qty: 60 TABLET | Refills: 5 | Status: SHIPPED | OUTPATIENT
Start: 2024-10-18

## 2024-10-18 RX ORDER — LANCING DEVICE/LANCETS
KIT MISCELLANEOUS
Qty: 1 EACH | Refills: 0 | Status: SHIPPED | OUTPATIENT
Start: 2024-10-18

## 2024-10-18 SDOH — ECONOMIC STABILITY: FOOD INSECURITY: WITHIN THE PAST 12 MONTHS, YOU WORRIED THAT YOUR FOOD WOULD RUN OUT BEFORE YOU GOT MONEY TO BUY MORE.: NEVER TRUE

## 2024-10-18 SDOH — ECONOMIC STABILITY: FOOD INSECURITY: WITHIN THE PAST 12 MONTHS, THE FOOD YOU BOUGHT JUST DIDN'T LAST AND YOU DIDN'T HAVE MONEY TO GET MORE.: NEVER TRUE

## 2024-10-18 SDOH — ECONOMIC STABILITY: INCOME INSECURITY: HOW HARD IS IT FOR YOU TO PAY FOR THE VERY BASICS LIKE FOOD, HOUSING, MEDICAL CARE, AND HEATING?: NOT HARD AT ALL

## 2024-10-18 ASSESSMENT — PATIENT HEALTH QUESTIONNAIRE - PHQ9
1. LITTLE INTEREST OR PLEASURE IN DOING THINGS: NOT AT ALL
SUM OF ALL RESPONSES TO PHQ QUESTIONS 1-9: 0
9. THOUGHTS THAT YOU WOULD BE BETTER OFF DEAD, OR OF HURTING YOURSELF: NOT AT ALL
6. FEELING BAD ABOUT YOURSELF - OR THAT YOU ARE A FAILURE OR HAVE LET YOURSELF OR YOUR FAMILY DOWN: NOT AT ALL
SUM OF ALL RESPONSES TO PHQ QUESTIONS 1-9: 0
SUM OF ALL RESPONSES TO PHQ QUESTIONS 1-9: 0
5. POOR APPETITE OR OVEREATING: NOT AT ALL
SUM OF ALL RESPONSES TO PHQ QUESTIONS 1-9: 0
4. FEELING TIRED OR HAVING LITTLE ENERGY: NOT AT ALL
2. FEELING DOWN, DEPRESSED OR HOPELESS: NOT AT ALL
7. TROUBLE CONCENTRATING ON THINGS, SUCH AS READING THE NEWSPAPER OR WATCHING TELEVISION: NOT AT ALL
10. IF YOU CHECKED OFF ANY PROBLEMS, HOW DIFFICULT HAVE THESE PROBLEMS MADE IT FOR YOU TO DO YOUR WORK, TAKE CARE OF THINGS AT HOME, OR GET ALONG WITH OTHER PEOPLE: NOT DIFFICULT AT ALL
8. MOVING OR SPEAKING SO SLOWLY THAT OTHER PEOPLE COULD HAVE NOTICED. OR THE OPPOSITE, BEING SO FIGETY OR RESTLESS THAT YOU HAVE BEEN MOVING AROUND A LOT MORE THAN USUAL: NOT AT ALL
SUM OF ALL RESPONSES TO PHQ9 QUESTIONS 1 & 2: 0
3. TROUBLE FALLING OR STAYING ASLEEP: NOT AT ALL

## 2024-10-18 ASSESSMENT — ENCOUNTER SYMPTOMS
COLOR CHANGE: 0
DIARRHEA: 0
SHORTNESS OF BREATH: 0
BLURRED VISION: 0
NAUSEA: 0
ABDOMINAL PAIN: 0
CONSTIPATION: 0
WHEEZING: 0
RHINORRHEA: 0
BACK PAIN: 0
COUGH: 0
BOWEL INCONTINENCE: 0
ORTHOPNEA: 0
VOMITING: 0

## 2024-10-18 NOTE — PROGRESS NOTES
HISTORY OF PRESENT ILLNESS  Chief Complaint   Patient presents with    Follow-up     5 months      NOTICE FOR THE PATIENT: This clinical note is not designed to be interpreted by patients.  We do not recommend reading it unless you have medical training. These notes may contain candid and (unintentionally) offensive descriptions, which are sometimes required for accurate documentation. If you would like more information about your healthcare, please obtain it directly by myself or my staff/colleagues - never solely from the notes. Thank you for your understanding and cooperation.        reports that she has been smoking cigarettes. She has a 64.5 pack-year smoking history. She has been exposed to tobacco smoke. She has never used smokeless tobacco.   Has cut back on smoking.  138-140 at home.    Keeps a headache all the time.  New glasses and can't see.    Is doing ok.  Can't step up on a curb with that knee.  Has to lead with the left.    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  She stepped up on the curb.  Hurts behind the knee and down the front of the leg.  Has been trying to work. 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  They thought she may have injured the menicus.\"    Diabetes Mellitus: Patient presents for follow up of diabetes. Symptoms: taking medications as instructed, no medication side effects noted, no TIA's, continued chest pain on exertion, no dyspnea on exertion, no swelling of ankles. Symptoms have reasonably well controlled.    Patient denies syncope.  She states she is compliant most of the time with her  medications. She  states she is compliant most of the time with her  diet.  Evaluation to date has been included below.   Home sugars: symptomatic hypoglycemia does not

## 2024-10-18 NOTE — PROGRESS NOTES
Charline Stinson, was evaluated through a synchronous (real-time) audio-video encounter. The patient (or guardian if applicable) is aware that this is a billable service, which includes applicable co-pays. This Virtual Visit was conducted with patient's (and/or legal guardian's) consent. Patient identification was verified, and a caregiver was present when appropriate.   The patient was located at {[employer] POS - Patient:[id number]}  Provider was located at {[employer] POS - Provider:[id number]}  Confirm you are appropriately licensed, registered, or certified to deliver care in the state where the patient is located as indicated above. If you are not or unsure, please re-schedule the visit: {[employer] State License:[id number]}    Charline Stinson (:  1965) is a {New vs Established:317936330::\"Established patient\"}, presenting virtually for evaluation of the following:      Below is the assessment and plan developed based on review of pertinent history, physical exam, labs, studies, and medications.     Assessment & Plan  Essential hypertension, benign   {A/P Summary:4138825155}         Type 2 diabetes mellitus without complication, without long-term current use of insulin (HCC)   {A/P Summary:6371997449::\"***\"}         Coronary artery disease involving native coronary artery of native heart without angina pectoris   {A/P Summary:6393435485::\"***\"}         Primary insomnia   {A/P Summary:6549681314::\"***\"}           No follow-ups on file.       Subjective   HPI  Review of Systems       Objective   Patient-Reported Vitals  No data recorded     Physical Exam  {Virtual visit PE with detailed exam Optional:763006725}       {Time Documentation Optional:294747389}    --Jeannie Ledbetter MA

## 2024-11-26 ENCOUNTER — OFFICE VISIT (OUTPATIENT)
Age: 59
End: 2024-11-26
Payer: COMMERCIAL

## 2024-11-26 VITALS
HEIGHT: 67 IN | HEART RATE: 64 BPM | SYSTOLIC BLOOD PRESSURE: 118 MMHG | WEIGHT: 203 LBS | BODY MASS INDEX: 31.86 KG/M2 | DIASTOLIC BLOOD PRESSURE: 84 MMHG

## 2024-11-26 DIAGNOSIS — I10 ESSENTIAL HYPERTENSION, BENIGN: ICD-10-CM

## 2024-11-26 DIAGNOSIS — I25.10 CORONARY ARTERY DISEASE INVOLVING NATIVE CORONARY ARTERY OF NATIVE HEART WITHOUT ANGINA PECTORIS: Primary | ICD-10-CM

## 2024-11-26 DIAGNOSIS — E78.2 MIXED HYPERLIPIDEMIA: ICD-10-CM

## 2024-11-26 PROCEDURE — 3079F DIAST BP 80-89 MM HG: CPT | Performed by: INTERNAL MEDICINE

## 2024-11-26 PROCEDURE — 99214 OFFICE O/P EST MOD 30 MIN: CPT | Performed by: INTERNAL MEDICINE

## 2024-11-26 PROCEDURE — 3074F SYST BP LT 130 MM HG: CPT | Performed by: INTERNAL MEDICINE

## 2024-11-26 NOTE — PROGRESS NOTES
[facility] CARDIOLOGY  [address] [place] [address], [address]  [place] [address]  PHONE: [phone]      24    NAME:  Charline Stinson  : 1965  MRN: [id number]       SUBJECTIVE:   Charline Stinson is a 59 y.o. female seen for a follow up visit regarding the following:     Chief Complaint   Patient presents with    Coronary Artery Disease         HPI:  Chronic cp has worsened  Holder persists and worse- still smoking.. No orthopnea or pnd. No palpitations or syncope.      Past Medical History, Past Surgical History, Family history, Social History, and Medications were all reviewed with the patient today and updated as necessary.     Current Outpatient Medications   Medication Sig Dispense Refill    Evolocumab 140 MG/ML SOAJ Inject 140 mg into the skin every 14 days 6 Adjustable Dose Pre-filled Pen Syringe 3    blood glucose test strips (ASCENSIA AUTODISC VI;ONE TOUCH ULTRA TEST VI) strip Test blood sugar daily, either fasting or 2 hours after eating for diabetes and record in log. (#100)  Dx: E11.9 100 each 3    clopidogrel (PLAVIX) 75 MG tablet Take 1 tablet by mouth daily 60 tablet 5    Lancet Devices (ONE TOUCH DELICA LANCING DEV) MISC Test blood sugar daily, either fasting or 2 hours after eating for diabetes and record in log. (#1)  Dx: E11.9 1 each 0    Tirzepatide (MOUNJARO) 7.5 MG/0.5ML SOPN SC injection Inject 0.5 mLs into the skin once a week 12 Adjustable Dose Pre-filled Pen Syringe 1    amLODIPine-olmesartan (JOSE) 5-20 MG per tablet 1 po qd for blood pressure instead of the amlodipine 90 tablet 1    Tirzepatide (MOUNJARO) 5 MG/0.5ML SOPN SC injection INJECT 5 MG UNDER THE SKIN ONCE A WEEK AFTER FINISHING 2.5MG DOSE 4 Adjustable Dose Pre-filled Pen Syringe 5    Blood Glucose Monitoring Suppl (ONE TOUCH ULTRA 2) w/Device KIT Test blood sugar daily, either fasting or 2 hours after eating for diabetes and record in log. (#1)  Dx: E11.9 1 kit 0    Lancets (ONETOUCH DELICA PLUS [id number]) MISC Test  [] : No [No] : No

## 2025-01-21 ENCOUNTER — OFFICE VISIT (OUTPATIENT)
Age: 60
End: 2025-01-21
Payer: COMMERCIAL

## 2025-01-21 VITALS
DIASTOLIC BLOOD PRESSURE: 80 MMHG | HEART RATE: 76 BPM | SYSTOLIC BLOOD PRESSURE: 130 MMHG | HEIGHT: 67 IN | BODY MASS INDEX: 33.12 KG/M2 | WEIGHT: 211 LBS

## 2025-01-21 DIAGNOSIS — I73.9 CLAUDICATION (HCC): ICD-10-CM

## 2025-01-21 DIAGNOSIS — I25.10 CORONARY ARTERY DISEASE INVOLVING NATIVE CORONARY ARTERY OF NATIVE HEART WITHOUT ANGINA PECTORIS: Primary | ICD-10-CM

## 2025-01-21 DIAGNOSIS — E78.2 MIXED HYPERLIPIDEMIA: ICD-10-CM

## 2025-01-21 DIAGNOSIS — I10 ESSENTIAL HYPERTENSION, BENIGN: ICD-10-CM

## 2025-01-21 PROCEDURE — 3079F DIAST BP 80-89 MM HG: CPT | Performed by: INTERNAL MEDICINE

## 2025-01-21 PROCEDURE — 99214 OFFICE O/P EST MOD 30 MIN: CPT | Performed by: INTERNAL MEDICINE

## 2025-01-21 PROCEDURE — 3075F SYST BP GE 130 - 139MM HG: CPT | Performed by: INTERNAL MEDICINE

## 2025-01-21 NOTE — PROGRESS NOTES
[facility] CARDIOLOGY  [address] [place] [address], [facility] [address]  [place] [address]  PHONE: [phone]      25    NAME:  Charline Stinson  : 1965  MRN: [id number]       SUBJECTIVE:   Charline Stinson is a 59 y.o. female seen for a follow up visit regarding the following:     Chief Complaint   Patient presents with    Coronary Artery Disease    Results     nuke         HPI:    Chronic cp at baseline. SOW worse- continues to smoke. No orthopnea or pnd. No palpitations or syncope.      Past Medical History, Past Surgical History, Family history, Social History, and Medications were all reviewed with the patient today and updated as necessary.     Current Outpatient Medications   Medication Sig Dispense Refill    Evolocumab 140 MG/ML SOAJ Inject 140 mg into the skin every 14 days 6 Adjustable Dose Pre-filled Pen Syringe 3    clopidogrel (PLAVIX) 75 MG tablet Take 1 tablet by mouth daily 60 tablet 5    Tirzepatide (MOUNJARO) 7.5 MG/0.5ML SOPN SC injection Inject 0.5 mLs into the skin once a week 12 Adjustable Dose Pre-filled Pen Syringe 1    amLODIPine-olmesartan (JOSE) 5-20 MG per tablet 1 po qd for blood pressure instead of the amlodipine 90 tablet 1    Tirzepatide (MOUNJARO) 5 MG/0.5ML SOPN SC injection INJECT 5 MG UNDER THE SKIN ONCE A WEEK AFTER FINISHING 2.5MG DOSE 4 Adjustable Dose Pre-filled Pen Syringe 5    albuterol sulfate HFA (PROVENTIL;VENTOLIN;PROAIR) 108 (90 Base) MCG/ACT inhaler 2 puffs 4 times daily if needed for shortness of breath or wheezing 3 each 3    umeclidinium-vilanterol (ANORO ELLIPTA) 62.5-25 MCG/ACT inhaler 1 inhalation every morning. 3 each 3    aspirin 81 MG EC tablet Take 1 tablet by mouth daily      blood glucose test strips (ASCENSIA AUTODISC VI;ONE TOUCH ULTRA TEST VI) strip Test blood sugar daily, either fasting or 2 hours after eating for diabetes and record in log. (#100)  Dx: E11.9 100 each 3    Lancet Devices (ONE TOUCH DELICA LANCING DEV) MISC Test blood sugar daily,

## 2025-02-18 RX ORDER — LANCETS 33 GAUGE
EACH MISCELLANEOUS
Qty: 100 EACH | Refills: 5 | Status: CANCELLED | OUTPATIENT
Start: 2025-02-18

## 2025-02-19 ENCOUNTER — OFFICE VISIT (OUTPATIENT)
Dept: FAMILY MEDICINE CLINIC | Facility: CLINIC | Age: 60
End: 2025-02-19
Payer: COMMERCIAL

## 2025-02-19 VITALS
BODY MASS INDEX: 33.87 KG/M2 | HEIGHT: 67 IN | OXYGEN SATURATION: 96 % | WEIGHT: 215.8 LBS | DIASTOLIC BLOOD PRESSURE: 61 MMHG | TEMPERATURE: 97.7 F | HEART RATE: 66 BPM | RESPIRATION RATE: 17 BRPM | SYSTOLIC BLOOD PRESSURE: 128 MMHG

## 2025-02-19 DIAGNOSIS — L68.0 FEMALE HIRSUTISM: ICD-10-CM

## 2025-02-19 DIAGNOSIS — E11.9 TYPE 2 DIABETES MELLITUS WITHOUT COMPLICATION, WITHOUT LONG-TERM CURRENT USE OF INSULIN (HCC): ICD-10-CM

## 2025-02-19 DIAGNOSIS — I10 ESSENTIAL HYPERTENSION, BENIGN: ICD-10-CM

## 2025-02-19 DIAGNOSIS — G47.33 OBSTRUCTIVE SLEEP APNEA (ADULT) (PEDIATRIC): ICD-10-CM

## 2025-02-19 DIAGNOSIS — D50.9 IRON DEFICIENCY ANEMIA, UNSPECIFIED IRON DEFICIENCY ANEMIA TYPE: ICD-10-CM

## 2025-02-19 DIAGNOSIS — L65.9 ALOPECIA: Primary | ICD-10-CM

## 2025-02-19 DIAGNOSIS — R63.5 WEIGHT GAIN: ICD-10-CM

## 2025-02-19 DIAGNOSIS — E78.2 MIXED HYPERLIPIDEMIA: ICD-10-CM

## 2025-02-19 DIAGNOSIS — J44.9 COPD, MILD (HCC): ICD-10-CM

## 2025-02-19 DIAGNOSIS — Z72.0 TOBACCO ABUSE: ICD-10-CM

## 2025-02-19 DIAGNOSIS — J44.1 CHRONIC OBSTRUCTIVE PULMONARY DISEASE WITH ACUTE EXACERBATION (HCC): ICD-10-CM

## 2025-02-19 LAB
ANION GAP SERPL CALC-SCNC: 11 MMOL/L (ref 7–16)
BUN SERPL-MCNC: 13 MG/DL (ref 8–23)
CALCIUM SERPL-MCNC: 9.2 MG/DL (ref 8.8–10.2)
CHLORIDE SERPL-SCNC: 107 MMOL/L (ref 98–107)
CHOLEST SERPL-MCNC: 130 MG/DL (ref 0–200)
CO2 SERPL-SCNC: 24 MMOL/L (ref 20–29)
CREAT SERPL-MCNC: 0.66 MG/DL (ref 0.6–1.1)
EST. AVERAGE GLUCOSE BLD GHB EST-MCNC: 134 MG/DL
FERRITIN SERPL-MCNC: 153 NG/ML (ref 8–388)
GLUCOSE SERPL-MCNC: 92 MG/DL (ref 70–99)
HBA1C MFR BLD: 6.3 % (ref 0–5.6)
HDLC SERPL-MCNC: 47 MG/DL (ref 40–60)
HDLC SERPL: 2.8 (ref 0–5)
LDLC SERPL CALC-MCNC: 57 MG/DL (ref 0–100)
POTASSIUM SERPL-SCNC: 3.9 MMOL/L (ref 3.5–5.1)
SODIUM SERPL-SCNC: 141 MMOL/L (ref 136–145)
TRIGL SERPL-MCNC: 128 MG/DL (ref 0–150)
TSH, 3RD GENERATION: 1.08 UIU/ML (ref 0.27–4.2)
VLDLC SERPL CALC-MCNC: 26 MG/DL (ref 6–23)

## 2025-02-19 PROCEDURE — 3078F DIAST BP <80 MM HG: CPT | Performed by: FAMILY MEDICINE

## 2025-02-19 PROCEDURE — 99214 OFFICE O/P EST MOD 30 MIN: CPT | Performed by: FAMILY MEDICINE

## 2025-02-19 PROCEDURE — 3074F SYST BP LT 130 MM HG: CPT | Performed by: FAMILY MEDICINE

## 2025-02-19 SDOH — ECONOMIC STABILITY: FOOD INSECURITY: WITHIN THE PAST 12 MONTHS, YOU WORRIED THAT YOUR FOOD WOULD RUN OUT BEFORE YOU GOT MONEY TO BUY MORE.: NEVER TRUE

## 2025-02-19 SDOH — ECONOMIC STABILITY: FOOD INSECURITY: WITHIN THE PAST 12 MONTHS, THE FOOD YOU BOUGHT JUST DIDN'T LAST AND YOU DIDN'T HAVE MONEY TO GET MORE.: NEVER TRUE

## 2025-02-19 ASSESSMENT — PATIENT HEALTH QUESTIONNAIRE - PHQ9
SUM OF ALL RESPONSES TO PHQ QUESTIONS 1-9: 0
SUM OF ALL RESPONSES TO PHQ QUESTIONS 1-9: 0
2. FEELING DOWN, DEPRESSED OR HOPELESS: NOT AT ALL
SUM OF ALL RESPONSES TO PHQ QUESTIONS 1-9: 0
SUM OF ALL RESPONSES TO PHQ QUESTIONS 1-9: 0
1. LITTLE INTEREST OR PLEASURE IN DOING THINGS: NOT AT ALL
SUM OF ALL RESPONSES TO PHQ9 QUESTIONS 1 & 2: 0

## 2025-02-19 ASSESSMENT — ENCOUNTER SYMPTOMS
BLURRED VISION: 0
NAUSEA: 0
COLOR CHANGE: 0
COUGH: 0
SHORTNESS OF BREATH: 0
ABDOMINAL PAIN: 0
BOWEL INCONTINENCE: 0
BACK PAIN: 1
RHINORRHEA: 0
VOMITING: 0
DIARRHEA: 0
CONSTIPATION: 0
WHEEZING: 0
ORTHOPNEA: 0

## 2025-02-19 NOTE — PROGRESS NOTES
HISTORY OF PRESENT ILLNESS  Chief Complaint   Patient presents with    Hypertension     NOTICE FOR THE PATIENT: This clinical note is not designed to be interpreted by patients.  We do not recommend reading it unless you have medical training. These notes may contain candid and (unintentionally) offensive descriptions, which are sometimes required for accurate documentation. If you would like more information about your healthcare, please obtain it directly by myself or my staff/colleagues - never solely from the notes. Thank you for your understanding and cooperation.     Worried about thinning hair on the vertex.    Walking a good bit.    Last Flu Vaccine  09/01/2020  Influenza, FLUZONE High Dose, (age 65 y+), IM, Trivalent PF, 0.5mL   [employer] P[employer]     Date of last Mammogram: 1/12/2024   Date of last Colonoscopy: 12/22/2021   No cologuard on file   Date of last Colonoscopy: 12/22/2021   No cologuard on file  No FIT/FOBT on file   No flexible sigmoidoscopy on file   History of Present Illness  The patient presents for evaluation of diabetes, hypertension, hair thinning, and facial hair growth.    She reports satisfactory blood glucose levels. However, she experienced a weight increase due to a 3-week interruption in her injection therapy, which has since been resumed. She does not have any lower dose injections at home.    She has been experiencing chest pain, which was evaluated with a stress test approximately 3 weeks ago, yielding normal results. Her physical activity includes walking, yard work, and dog care.    She has not yet undergone her scheduled mammogram and colonoscopy but plans to arrange these appointments. She has not received the influenza vaccine this year and attempts to maintain social distancing as much as possible. She is not currently on iron supplementation.    She has been advised by her hairstylist to consult regarding potential thyroid testing due to observed hair

## 2025-02-23 LAB — TESTOST SERPL-MCNC: 73.6 NG/DL (ref 7–40)

## 2025-02-24 NOTE — RESULT ENCOUNTER NOTE
Your testosterone level is slightly elevated.  If the hair growth bothers you we could add a medication to lower your testosterone level that might stop the progression of facial hair.  Your A1c is in the prediabetic range. That means your average blood sugar is elevated.  That puts you at higher risk for heart disease. The American College of cardiology guidelines stress the importance of lifestyle modification as the foundation to lowering your cardiovascular disease risk.  This includes eating a heart healthy nutritious diet, regular aerobic physical activity maintenance of desired body weight and avoidance of tobacco products.  Continue your current medications.  Recheck labs with an appointment in 3-6 months. Other labs are normal.
Stable

## 2025-03-10 ENCOUNTER — TELEPHONE (OUTPATIENT)
Dept: FAMILY MEDICINE CLINIC | Facility: CLINIC | Age: 60
End: 2025-03-10

## 2025-03-10 DIAGNOSIS — I10 ESSENTIAL HYPERTENSION, BENIGN: ICD-10-CM

## 2025-03-10 DIAGNOSIS — L68.0 FEMALE HIRSUTISM: Primary | ICD-10-CM

## 2025-03-10 DIAGNOSIS — Z79.899 ENCOUNTER FOR LONG-TERM (CURRENT) USE OF MEDICATIONS: ICD-10-CM

## 2025-03-11 NOTE — TELEPHONE ENCOUNTER
It looks like you have not viewed Dr. Cooley's advice about your test.  Please refer to your [employer]PP[employer] message for more information.       Your testosterone level is slightly elevated.  If the hair growth bothers you we could add a medication to lower your testosterone level that might stop the progression of facial hair.  Your A1c is in the prediabetic range. That means your average blood sugar is elevated.  That puts you at higher risk for heart disease. The American College of cardiology guidelines stress the importance of lifestyle modification as the foundation to lowering your cardiovascular disease risk.  This includes eating a heart healthy nutritious diet, regular aerobic physical activity maintenance of desired body weight and avoidance of tobacco products.  Continue your current medications.  Recheck labs with an appointment in 3-6 months. Other labs are normal.

## 2025-03-12 RX ORDER — SPIRONOLACTONE 100 MG/1
100 TABLET, FILM COATED ORAL DAILY
Qty: 30 TABLET | Refills: 5 | Status: SHIPPED | OUTPATIENT
Start: 2025-03-12

## 2025-03-12 NOTE — TELEPHONE ENCOUNTER
We may want to have her come back through 4 weeks after starting the medication to do a lab test.  She can to schedule a lab appointment for that.    Prescription (s) refilled  It (they) has been escribed to the pharmacy.    Requested Prescriptions     Signed Prescriptions Disp Refills    spironolactone (ALDACTONE) 100 MG tablet 30 tablet 5     Sig: Take 1 tablet by mouth daily     Authorizing Provider: TREY EDWARDS     Orders Placed This Encounter   Procedures    Basic Metabolic Panel     Standing Status:   Future     Expected Date:   3/12/2025     Expiration Date:   9/12/2026           ICD-10-CM    1. Female hirsutism  L68.0 spironolactone (ALDACTONE) 100 MG tablet      2. Essential hypertension, benign  I10 spironolactone (ALDACTONE) 100 MG tablet     Basic Metabolic Panel      3. Encounter for long-term (current) use of medications  Z79.899 Basic Metabolic Panel

## 2025-06-13 ENCOUNTER — OFFICE VISIT (OUTPATIENT)
Dept: FAMILY MEDICINE CLINIC | Facility: CLINIC | Age: 60
End: 2025-06-13
Payer: COMMERCIAL

## 2025-06-13 VITALS
DIASTOLIC BLOOD PRESSURE: 68 MMHG | HEART RATE: 66 BPM | TEMPERATURE: 97.9 F | RESPIRATION RATE: 16 BRPM | OXYGEN SATURATION: 97 % | SYSTOLIC BLOOD PRESSURE: 131 MMHG | WEIGHT: 211 LBS | BODY MASS INDEX: 33.12 KG/M2 | HEIGHT: 67 IN

## 2025-06-13 DIAGNOSIS — E78.2 MIXED HYPERLIPIDEMIA: ICD-10-CM

## 2025-06-13 DIAGNOSIS — F39 MOOD DISORDER: ICD-10-CM

## 2025-06-13 DIAGNOSIS — I25.10 CORONARY ARTERY DISEASE INVOLVING NATIVE CORONARY ARTERY OF NATIVE HEART WITHOUT ANGINA PECTORIS: ICD-10-CM

## 2025-06-13 DIAGNOSIS — J01.90 ACUTE BACTERIAL SINUSITIS: ICD-10-CM

## 2025-06-13 DIAGNOSIS — M25.561 CHRONIC PAIN OF RIGHT KNEE: ICD-10-CM

## 2025-06-13 DIAGNOSIS — S46.011S SUPRASPINATUS TENDON RUPTURE, RIGHT, SEQUELA: ICD-10-CM

## 2025-06-13 DIAGNOSIS — M79.606 PAIN OF ANTERIOR LOWER EXTREMITY, UNSPECIFIED LATERALITY: ICD-10-CM

## 2025-06-13 DIAGNOSIS — Z12.11 SCREENING FOR MALIGNANT NEOPLASM OF COLON: Primary | ICD-10-CM

## 2025-06-13 DIAGNOSIS — Z72.0 TOBACCO ABUSE: ICD-10-CM

## 2025-06-13 DIAGNOSIS — D12.6 TUBULOVILLOUS ADENOMA OF COLON: ICD-10-CM

## 2025-06-13 DIAGNOSIS — J44.9 COPD, MILD (HCC): ICD-10-CM

## 2025-06-13 DIAGNOSIS — M79.605 LEG PAIN, BILATERAL: ICD-10-CM

## 2025-06-13 DIAGNOSIS — M48.02 SPINAL STENOSIS IN CERVICAL REGION: ICD-10-CM

## 2025-06-13 DIAGNOSIS — M25.361 RIGHT KNEE BUCKLING: ICD-10-CM

## 2025-06-13 DIAGNOSIS — E11.9 TYPE 2 DIABETES MELLITUS WITHOUT COMPLICATION, WITHOUT LONG-TERM CURRENT USE OF INSULIN (HCC): ICD-10-CM

## 2025-06-13 DIAGNOSIS — M48.062 SPINAL STENOSIS, LUMBAR REGION WITH NEUROGENIC CLAUDICATION: ICD-10-CM

## 2025-06-13 DIAGNOSIS — M75.81: ICD-10-CM

## 2025-06-13 DIAGNOSIS — M79.604 LEG PAIN, BILATERAL: ICD-10-CM

## 2025-06-13 DIAGNOSIS — M48.061 SPINAL STENOSIS OF LUMBAR REGION, UNSPECIFIED WHETHER NEUROGENIC CLAUDICATION PRESENT: ICD-10-CM

## 2025-06-13 DIAGNOSIS — M25.511 ACUTE PAIN OF RIGHT SHOULDER: ICD-10-CM

## 2025-06-13 DIAGNOSIS — R51.9 FRONTAL HEADACHE: ICD-10-CM

## 2025-06-13 DIAGNOSIS — G89.29 CHRONIC PAIN OF RIGHT KNEE: ICD-10-CM

## 2025-06-13 DIAGNOSIS — M62.838 TRAPEZIUS MUSCLE SPASM: ICD-10-CM

## 2025-06-13 DIAGNOSIS — I10 ESSENTIAL HYPERTENSION, BENIGN: ICD-10-CM

## 2025-06-13 DIAGNOSIS — B96.89 ACUTE BACTERIAL SINUSITIS: ICD-10-CM

## 2025-06-13 PROCEDURE — 3044F HG A1C LEVEL LT 7.0%: CPT | Performed by: FAMILY MEDICINE

## 2025-06-13 PROCEDURE — 3078F DIAST BP <80 MM HG: CPT | Performed by: FAMILY MEDICINE

## 2025-06-13 PROCEDURE — 99214 OFFICE O/P EST MOD 30 MIN: CPT | Performed by: FAMILY MEDICINE

## 2025-06-13 PROCEDURE — 3075F SYST BP GE 130 - 139MM HG: CPT | Performed by: FAMILY MEDICINE

## 2025-06-13 RX ORDER — CLOPIDOGREL BISULFATE 75 MG/1
75 TABLET ORAL DAILY
Qty: 60 TABLET | Refills: 5 | Status: SHIPPED | OUTPATIENT
Start: 2025-06-13

## 2025-06-13 RX ORDER — CEPHALEXIN 500 MG/1
500 CAPSULE ORAL 3 TIMES DAILY
Qty: 30 CAPSULE | Refills: 0 | Status: SHIPPED | OUTPATIENT
Start: 2025-06-13 | End: 2025-06-23

## 2025-06-13 RX ORDER — CYCLOBENZAPRINE HCL 10 MG
10 TABLET ORAL 3 TIMES DAILY PRN
Qty: 60 TABLET | Refills: 2 | Status: SHIPPED | OUTPATIENT
Start: 2025-06-13 | End: 2025-09-11

## 2025-06-13 ASSESSMENT — PATIENT HEALTH QUESTIONNAIRE - PHQ9
SUM OF ALL RESPONSES TO PHQ QUESTIONS 1-9: 0
2. FEELING DOWN, DEPRESSED OR HOPELESS: NOT AT ALL
SUM OF ALL RESPONSES TO PHQ QUESTIONS 1-9: 0
SUM OF ALL RESPONSES TO PHQ QUESTIONS 1-9: 0
1. LITTLE INTEREST OR PLEASURE IN DOING THINGS: NOT AT ALL
SUM OF ALL RESPONSES TO PHQ QUESTIONS 1-9: 0

## 2025-06-13 NOTE — PROGRESS NOTES
interferes with sleep and daily activities, causing weakness and difficulty holding objects.  - Referral to [facility] for further evaluation and management.    3. Headaches.  - Constant headaches over the right eye for the past two weeks.  - Over-the-counter medications provide minimal relief.  - Keflex prescribed to address potential infection contributing to headaches.    4. Back pain.  - Chronic back pain described as constantly on fire.  - Pain may contribute to heaviness in legs and tightness in thighs.  - Advised to consult with cardiologist and pulmonologist regarding potential back surgery and associated risks due to COPD and heart issues.    5. Weight management.  - Unable to obtain Mounjaro 7 mg for the past two months, resulting in weight gain.  - Prescription for Mounjaro 2.5 mg sent to pharmacy.    6. Smoking cessation.  - Desire to quit smoking but struggles with cravings during stressful situations.  - Wellbutrin and nicotine patches recommended to assist with smoking cessation.  - Advised to find alternative activities to manage cravings.          Her other chronic conditions are stable at this time.  She continues to be followed by her previous specialists for the above chronic issues.  She is stable on the current treatment and tolerating it well.  No significant sides effects.  Will not adjust therapy at this time, unless noted above.   We will continue to monitor for any problems.  Medications refilled and lab work has been ordered where needed.  Reviewed medications are explained including any potential interactions or side effects in detail. The patient's questions were answered.  She  understands the above and has no further questions.    Further workup and treatment should be done if symptoms persist, worsen or new symptoms occur. She  will call to notify us of any problems, complications or worsening symptoms.    Follow-up and Dispositions    Return in about 5 weeks (around 7/18/2025)

## 2025-06-23 ASSESSMENT — ENCOUNTER SYMPTOMS
WHEEZING: 0
RHINORRHEA: 0
SORE THROAT: 1
VOMITING: 0
COUGH: 0
COLOR CHANGE: 0
CONSTIPATION: 0
NAUSEA: 0
SINUS PRESSURE: 1
DIARRHEA: 0

## 2025-07-23 ASSESSMENT — ENCOUNTER SYMPTOMS
SINUS PRESSURE: 1
BOWEL INCONTINENCE: 0
SHORTNESS OF BREATH: 0
RHINORRHEA: 0
BACK PAIN: 1
ORTHOPNEA: 0
VOMITING: 0
SORE THROAT: 1
ABDOMINAL PAIN: 0
NAUSEA: 0
COUGH: 0
BLURRED VISION: 0

## 2025-07-24 ENCOUNTER — OFFICE VISIT (OUTPATIENT)
Dept: FAMILY MEDICINE CLINIC | Facility: CLINIC | Age: 60
End: 2025-07-24
Payer: COMMERCIAL

## 2025-07-24 VITALS
WEIGHT: 215.2 LBS | RESPIRATION RATE: 18 BRPM | HEIGHT: 67 IN | HEART RATE: 62 BPM | SYSTOLIC BLOOD PRESSURE: 155 MMHG | TEMPERATURE: 98.1 F | OXYGEN SATURATION: 96 % | DIASTOLIC BLOOD PRESSURE: 82 MMHG | BODY MASS INDEX: 33.78 KG/M2

## 2025-07-24 DIAGNOSIS — E11.9 TYPE 2 DIABETES MELLITUS WITHOUT COMPLICATION, WITHOUT LONG-TERM CURRENT USE OF INSULIN (HCC): Primary | ICD-10-CM

## 2025-07-24 DIAGNOSIS — E78.2 MIXED HYPERLIPIDEMIA: ICD-10-CM

## 2025-07-24 DIAGNOSIS — I10 ESSENTIAL HYPERTENSION, BENIGN: ICD-10-CM

## 2025-07-24 DIAGNOSIS — I25.10 CORONARY ARTERY DISEASE INVOLVING NATIVE CORONARY ARTERY OF NATIVE HEART WITHOUT ANGINA PECTORIS: ICD-10-CM

## 2025-07-24 LAB
ALBUMIN SERPL-MCNC: 3.5 G/DL (ref 3.2–4.6)
ALBUMIN/GLOB SERPL: 1 (ref 1–1.9)
ALP SERPL-CCNC: 103 U/L (ref 35–104)
ALT SERPL-CCNC: 18 U/L (ref 8–45)
ANION GAP SERPL CALC-SCNC: 12 MMOL/L (ref 7–16)
AST SERPL-CCNC: 16 U/L (ref 15–37)
BASOPHILS # BLD: 0.03 K/UL (ref 0–0.2)
BASOPHILS NFR BLD: 0.4 % (ref 0–2)
BILIRUB SERPL-MCNC: 0.2 MG/DL (ref 0–1.2)
BUN SERPL-MCNC: 9 MG/DL (ref 8–23)
CALCIUM SERPL-MCNC: 9.3 MG/DL (ref 8.8–10.2)
CHLORIDE SERPL-SCNC: 106 MMOL/L (ref 98–107)
CHOLEST SERPL-MCNC: 204 MG/DL (ref 0–200)
CO2 SERPL-SCNC: 24 MMOL/L (ref 20–29)
CREAT SERPL-MCNC: 0.7 MG/DL (ref 0.6–1.1)
CREAT UR-MCNC: 44.7 MG/DL (ref 28–217)
DIFFERENTIAL METHOD BLD: NORMAL
EOSINOPHIL # BLD: 0.17 K/UL (ref 0–0.8)
EOSINOPHIL NFR BLD: 2.2 % (ref 0.5–7.8)
ERYTHROCYTE [DISTWIDTH] IN BLOOD BY AUTOMATED COUNT: 14 % (ref 11.9–14.6)
EST. AVERAGE GLUCOSE BLD GHB EST-MCNC: 130 MG/DL
GLOBULIN SER CALC-MCNC: 3.4 G/DL (ref 2.3–3.5)
GLUCOSE SERPL-MCNC: 115 MG/DL (ref 70–99)
HBA1C MFR BLD: 6.2 % (ref 0–5.6)
HCT VFR BLD AUTO: 43.5 % (ref 35.8–46.3)
HDLC SERPL-MCNC: 39 MG/DL (ref 40–60)
HDLC SERPL: 5.2 (ref 0–5)
HGB BLD-MCNC: 14.1 G/DL (ref 11.7–15.4)
IMM GRANULOCYTES # BLD AUTO: 0.04 K/UL (ref 0–0.5)
IMM GRANULOCYTES NFR BLD AUTO: 0.5 % (ref 0–5)
LDLC SERPL CALC-MCNC: 131 MG/DL (ref 0–100)
LYMPHOCYTES # BLD: 2.44 K/UL (ref 0.5–4.6)
LYMPHOCYTES NFR BLD: 32.2 % (ref 13–44)
MCH RBC QN AUTO: 27.8 PG (ref 26.1–32.9)
MCHC RBC AUTO-ENTMCNC: 32.4 G/DL (ref 31.4–35)
MCV RBC AUTO: 85.6 FL (ref 82–102)
MICROALBUMIN UR-MCNC: <1.2 MG/DL (ref 0–20)
MICROALBUMIN/CREAT UR-RTO: NORMAL MG/G (ref 0–30)
MONOCYTES # BLD: 0.52 K/UL (ref 0.1–1.3)
MONOCYTES NFR BLD: 6.9 % (ref 4–12)
NEUTS SEG # BLD: 4.37 K/UL (ref 1.7–8.2)
NEUTS SEG NFR BLD: 57.8 % (ref 43–78)
NRBC # BLD: 0 K/UL (ref 0–0.2)
PLATELET # BLD AUTO: 293 K/UL (ref 150–450)
PMV BLD AUTO: 9.9 FL (ref 9.4–12.3)
POTASSIUM SERPL-SCNC: 4 MMOL/L (ref 3.5–5.1)
PROT SERPL-MCNC: 6.9 G/DL (ref 6.3–8.2)
RBC # BLD AUTO: 5.08 M/UL (ref 4.05–5.2)
SODIUM SERPL-SCNC: 141 MMOL/L (ref 136–145)
TRIGL SERPL-MCNC: 167 MG/DL (ref 0–150)
VLDLC SERPL CALC-MCNC: 33 MG/DL (ref 6–23)
WBC # BLD AUTO: 7.6 K/UL (ref 4.3–11.1)

## 2025-07-24 PROCEDURE — 3078F DIAST BP <80 MM HG: CPT | Performed by: FAMILY MEDICINE

## 2025-07-24 PROCEDURE — 3044F HG A1C LEVEL LT 7.0%: CPT | Performed by: FAMILY MEDICINE

## 2025-07-24 PROCEDURE — 99214 OFFICE O/P EST MOD 30 MIN: CPT | Performed by: FAMILY MEDICINE

## 2025-07-24 PROCEDURE — 3077F SYST BP >= 140 MM HG: CPT | Performed by: FAMILY MEDICINE

## 2025-07-24 RX ORDER — AMLODIPINE AND OLMESARTAN MEDOXOMIL 5; 20 MG/1; MG/1
TABLET ORAL
Qty: 90 TABLET | Refills: 2 | Status: SHIPPED | OUTPATIENT
Start: 2025-07-24

## 2025-07-24 NOTE — PROGRESS NOTES
HISTORY OF PRESENT ILLNESS  Chief Complaint   Patient presents with    Diabetes     NOTICE FOR THE PATIENT: This clinical note is not designed to be interpreted by patients.  We do not recommend reading it unless you have medical training. These notes may contain candid and (unintentionally) offensive descriptions, which are sometimes required for accurate documentation. If you would like more information about your healthcare, please obtain it directly by myself or my staff/colleagues - never solely from the notes. Thank you for your understanding and cooperation.     Still drinking the Dr. Peppers    Diabetes Mellitus: Patient presents for follow up of diabetes. Symptoms: taking medications as instructed, no medication side effects noted, no TIA's, no chest pain on exertion, no dyspnea on exertion, no swelling of ankles. Symptoms have asymptomatic.    Patient denies chest pain.  She states she is compliant most of the time with her  medications. She  states she is compliant most of the time with her  diet.  Evaluation to date has been included below.   Home sugars: symptomatic hypoglycemia does not occur. Treatment to date: medications.  Diabetic issues reviewed with her: low cholesterol diet, weight control and daily exercise discussed.  Diabetic Medications       Incretin Mimetic Agents       Tirzepatide (MOUNJARO) 2.5 MG/0.5ML SOAJ pen Inject 2.5 mg into the skin every 7 days     Tirzepatide (MOUNJARO) 7.5 MG/0.5ML SOPN SC injection Inject 0.5 mLs into the skin once a week     Patient not taking: Reported on 7/24/2025           Cardiac Medications       Potassium Sparing Diuretics       spironolactone (ALDACTONE) 100 MG tablet Take 1 tablet by mouth daily     Patient not taking: Reported on 7/24/2025       Antihypertensive Combinations       amLODIPine-olmesartan (JOSE) 5-20 MG per tablet 1 po qd for blood pressure instead of the amlodipine       Proprotein Convertase Subtilisin/Kexin Type 9 Inhibitors

## 2025-08-13 ENCOUNTER — OFFICE VISIT (OUTPATIENT)
Dept: PULMONOLOGY | Age: 60
End: 2025-08-13

## 2025-08-13 VITALS
WEIGHT: 211.6 LBS | RESPIRATION RATE: 18 BRPM | HEIGHT: 67 IN | DIASTOLIC BLOOD PRESSURE: 74 MMHG | HEART RATE: 68 BPM | TEMPERATURE: 98.8 F | OXYGEN SATURATION: 96 % | BODY MASS INDEX: 33.21 KG/M2 | SYSTOLIC BLOOD PRESSURE: 110 MMHG

## 2025-08-13 DIAGNOSIS — G89.29 CHRONIC BACK PAIN, UNSPECIFIED BACK LOCATION, UNSPECIFIED BACK PAIN LATERALITY: ICD-10-CM

## 2025-08-13 DIAGNOSIS — F17.219 CIGARETTE NICOTINE DEPENDENCE WITH NICOTINE-INDUCED DISORDER: ICD-10-CM

## 2025-08-13 DIAGNOSIS — F17.218 CIGARETTE NICOTINE DEPENDENCE WITH OTHER NICOTINE-INDUCED DISORDER: ICD-10-CM

## 2025-08-13 DIAGNOSIS — M54.9 CHRONIC BACK PAIN, UNSPECIFIED BACK LOCATION, UNSPECIFIED BACK PAIN LATERALITY: ICD-10-CM

## 2025-08-13 DIAGNOSIS — J44.9 COPD, MILD (HCC): Primary | ICD-10-CM

## 2025-08-13 DIAGNOSIS — J43.9 PULMONARY EMPHYSEMA, UNSPECIFIED EMPHYSEMA TYPE (HCC): ICD-10-CM

## 2025-08-13 LAB
EXPIRATORY TIME: NORMAL
FEF 25-75% %PRED-PRE: NORMAL
FEF 25-75% PRED: NORMAL
FEF 25-75-PRE: NORMAL
FEV1 %PRED-PRE: 67 %
FEV1 PRED: 2.69 L
FEV1/FVC %PRED-PRE: NORMAL
FEV1/FVC PRED: NORMAL
FEV1/FVC: 74 %
FEV1: 1.81 L
FVC %PRED-PRE: 71 %
FVC PRED: 3.45 L
FVC: 2.46 L
PEF %PRED-PRE: NORMAL
PEF PRED: NORMAL
PEF-PRE: NORMAL

## 2025-08-13 RX ORDER — LEVALBUTEROL TARTRATE 45 UG/1
1-2 AEROSOL, METERED ORAL EVERY 4 HOURS PRN
Qty: 1 EACH | Refills: 11 | Status: SHIPPED | OUTPATIENT
Start: 2025-08-13

## 2025-08-13 ASSESSMENT — ENCOUNTER SYMPTOMS
SHORTNESS OF BREATH: 1
SPUTUM PRODUCTION: 1
COUGH: 1
WHEEZING: 1
HEMOPTYSIS: 0
BACK PAIN: 1

## 2025-08-13 ASSESSMENT — PULMONARY FUNCTION TESTS
FEV1_PERCENT_PREDICTED_PRE: 67
FVC_PERCENT_PREDICTED_PRE: 71
FEV1/FVC: 74
FVC: 2.46
FEV1: 1.81
FVC_PREDICTED: 3.45
FEV1_PREDICTED: 2.69

## 2025-08-29 ENCOUNTER — HOSPITAL ENCOUNTER (OUTPATIENT)
Dept: CT IMAGING | Age: 60
Discharge: HOME | End: 2025-08-31
Payer: COMMERCIAL

## 2025-08-29 DIAGNOSIS — F17.219 CIGARETTE NICOTINE DEPENDENCE WITH NICOTINE-INDUCED DISORDER: ICD-10-CM

## 2025-08-29 PROCEDURE — 71271 CT THORAX LUNG CANCER SCR C-: CPT

## (undated) DEVICE — BAND RADIAL COMPR ARTERY 24CM -- REG BX/10

## (undated) DEVICE — CATHETER DIAG AD 5FR L110CM 145DEG COR GRY HYDRPHLC NYL

## (undated) DEVICE — CATHETER GUID AD 6FR L100CM COR PERIPH GRN NYL PTFE XB L 3

## (undated) DEVICE — BAND COMPR L24CM REG CLR PLAS HEMSTAT EXT HK AND LOOP RETEN

## (undated) DEVICE — GLIDESHEATH SLENDER STAINLESS STEEL KIT: Brand: GLIDESHEATH SLENDER

## (undated) DEVICE — GUIDEWIRE 035IN 210CM PTFE COAT FIX COR J TIP 15MM FIRM BODY

## (undated) DEVICE — RADIFOCUS OPTITORQUE ANGIOGRAPHIC CATHETER: Brand: OPTITORQUE

## (undated) DEVICE — CATHETER COR DIAG 4.0 5FR 110CM 2 SIDE H